# Patient Record
Sex: FEMALE | Race: WHITE
[De-identification: names, ages, dates, MRNs, and addresses within clinical notes are randomized per-mention and may not be internally consistent; named-entity substitution may affect disease eponyms.]

---

## 2017-01-23 ENCOUNTER — HOSPITAL ENCOUNTER (OUTPATIENT)
Dept: HOSPITAL 45 - C.LABSPEC | Age: 77
Discharge: HOME | End: 2017-01-23
Attending: INTERNAL MEDICINE
Payer: COMMERCIAL

## 2017-01-23 DIAGNOSIS — E78.5: ICD-10-CM

## 2017-01-23 DIAGNOSIS — E11.9: Primary | ICD-10-CM

## 2017-01-23 DIAGNOSIS — I10: ICD-10-CM

## 2017-01-23 LAB
ANION GAP SERPL CALC-SCNC: 9 MMOL/L (ref 3–11)
BUN SERPL-MCNC: 14 MG/DL (ref 7–18)
BUN/CREAT SERPL: 12.5 (ref 10–20)
CALCIUM SERPL-MCNC: 9.4 MG/DL (ref 8.5–10.1)
CHLORIDE SERPL-SCNC: 100 MMOL/L (ref 98–107)
CHOLEST/HDLC SERPL: 3.1 {RATIO}
CO2 SERPL-SCNC: 30 MMOL/L (ref 21–32)
CREAT SERPL-MCNC: 1.1 MG/DL (ref 0.6–1.2)
EST. AVERAGE GLUCOSE BLD GHB EST-MCNC: 137 MG/DL
GLUCOSE SERPL-MCNC: 103 MG/DL (ref 70–99)
GLUCOSE UR QL: 73 MG/DL
NITRITE UR QL STRIP: 131 MG/DL (ref 0–150)
PH UR: 226 MG/DL (ref 0–200)
POTASSIUM SERPL-SCNC: 3.9 MMOL/L (ref 3.5–5.1)
SODIUM SERPL-SCNC: 139 MMOL/L (ref 136–145)
VERY LOW DENSITY LIPOPROT CALC: 26 MG/DL

## 2017-02-25 ENCOUNTER — HOSPITAL ENCOUNTER (EMERGENCY)
Dept: HOSPITAL 45 - C.EDB | Age: 77
Discharge: HOME | End: 2017-02-25
Payer: COMMERCIAL

## 2017-02-25 VITALS
WEIGHT: 174.83 LBS | BODY MASS INDEX: 28.1 KG/M2 | BODY MASS INDEX: 28.1 KG/M2 | HEIGHT: 65.98 IN | WEIGHT: 174.83 LBS | HEIGHT: 65.98 IN

## 2017-02-25 VITALS — SYSTOLIC BLOOD PRESSURE: 146 MMHG | OXYGEN SATURATION: 96 % | DIASTOLIC BLOOD PRESSURE: 82 MMHG | HEART RATE: 70 BPM

## 2017-02-25 VITALS — TEMPERATURE: 98.24 F

## 2017-02-25 DIAGNOSIS — S22.000A: Primary | ICD-10-CM

## 2017-02-25 DIAGNOSIS — Z79.899: ICD-10-CM

## 2017-02-25 DIAGNOSIS — E11.9: ICD-10-CM

## 2017-02-25 DIAGNOSIS — Z83.3: ICD-10-CM

## 2017-02-25 DIAGNOSIS — I10: ICD-10-CM
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
## 2017-02-25 DIAGNOSIS — M06.9: ICD-10-CM

## 2017-02-25 DIAGNOSIS — Z87.891: ICD-10-CM

## 2017-02-25 LAB
ALBUMIN/GLOB SERPL: 0.6 {RATIO} (ref 0.9–2)
ALP SERPL-CCNC: 56 U/L (ref 45–117)
ALT SERPL-CCNC: 16 U/L (ref 12–78)
ANION GAP SERPL CALC-SCNC: 7 MMOL/L (ref 3–11)
APPEARANCE UR: CLEAR
AST SERPL-CCNC: 19 U/L (ref 15–37)
BILIRUB UR-MCNC: (no result) MG/DL
BUN SERPL-MCNC: 17 MG/DL (ref 7–18)
BUN/CREAT SERPL: 13.2 (ref 10–20)
CALCIUM SERPL-MCNC: 9.3 MG/DL (ref 8.5–10.1)
CHLORIDE SERPL-SCNC: 99 MMOL/L (ref 98–107)
CO2 SERPL-SCNC: 30 MMOL/L (ref 21–32)
COLOR UR: YELLOW
CREAT CL PREDICTED SERPL C-G-VRATE: 37.3 ML/MIN
CREAT SERPL-MCNC: 1.3 MG/DL (ref 0.6–1.2)
EOSINOPHIL NFR BLD AUTO: 190 K/UL (ref 130–400)
GLOBULIN SER-MCNC: 4.7 GM/DL (ref 2.5–4)
GLUCOSE SERPL-MCNC: 130 MG/DL (ref 70–99)
HCT VFR BLD CALC: 39.9 % (ref 37–47)
MANUAL MICROSCOPIC REQUIRED?: NO
MCH RBC QN AUTO: 33.2 PG (ref 25–34)
MCHC RBC AUTO-ENTMCNC: 34.6 G/DL (ref 32–36)
MCV RBC AUTO: 95.9 FL (ref 80–100)
NITRITE UR QL STRIP: (no result)
PH UR STRIP: 7 [PH] (ref 4.5–7.5)
PMV BLD AUTO: 10.9 FL (ref 7.4–10.4)
POTASSIUM SERPL-SCNC: 4.2 MMOL/L (ref 3.5–5.1)
RBC # BLD AUTO: 4.16 M/UL (ref 4.2–5.4)
REVIEW REQ?: NO
SODIUM SERPL-SCNC: 136 MMOL/L (ref 136–145)
SP GR UR STRIP: 1.01 (ref 1–1.03)
URINE EPITHELIAL CELL AUTO: >30 /LPF (ref 0–5)
UROBILINOGEN UR-MCNC: (no result) MG/DL
WBC # BLD AUTO: 13.61 K/UL (ref 4.8–10.8)
ZZUR CULT IF INDIC CLEAN CATCH: NO

## 2017-02-25 NOTE — DIAGNOSTIC IMAGING REPORT
THORACIC SPINE 3 VIEWS ROUTINE



CLINICAL HISTORY: Mid thoracic back pain    



COMPARISON STUDY:  Chest x-ray dated 12/26/2016



FINDINGS: There is a thoracolumbar S-shaped scoliosis. There are multiple

age-indeterminate anterior body compression deformities. There is interstitial

pulmonary fibrosis. There is a stable focal airspace opacity within the right

upper lung zone laterally.



IMPRESSION:  

1. Scoliosis

2. Multiple age-indeterminate vertebral body compression deformities

3. Interstitial pulmonary fibrosis. Stable peripheral 2.7 cm airspace opacity

within the right upper lung zone laterally









Electronically signed by:  Evan Colvin M.D.

2/25/2017 11:54 AM



Dictated Date/Time:  2/25/2017 11:51 AM

## 2017-02-25 NOTE — EMERGENCY ROOM VISIT NOTE
History


Report prepared by Irvin:  Nabil Uriostegui


Under the Supervision of:  Dr. Salo Bell M.D.


First contact with patient:  10:51


Chief Complaint:  URINARY SYMPTOMS


Stated Complaint:  FREQUENT URINATION,BACK PAIN


Nursing Triage Summary:  


Patient c/o left mid back pain, since Thursday of last week. States sometimes 


the pain is both sides of her back. Tylenol and Ibuprofen are not working. At 


rest, the pain is tolerable, but when standing pain gets worse and worsens the 


longer she is up. 











This morning she had to "urinate twice, which is not normal for me"





"my bowels, it seems I haven't gone in a day or two, but I don't have the urge, 


I'll have to eat a prune or two, that usually helps"





History of Present Illness


The patient is a 77 year old female who presents to the Emergency Room with 

complaints of constant worsening lower back pain for the past week. The patient 

states that it feels like it is a jabbing pain. The patient states that 

movement and standing makes it hurt worse. The patient denies any chest pain. 

She states that she is having some rib pain. The patient states that she went 

to the bathroom twice this morning already, and she stats that this is unusual 

for her. She states that she took ibuprofen and Tylenol, and she states that 

these have not helped. She additionally says that she has rheumatoid arthritis, 

and she called her rheumatologist, and they stated to increase her prednisone 

dosage. The patient denies any recent falls or traumas.





   Source of History:  patient


   Onset:  week ago


   Position:  back (lower)


   Quality:  other (jabbing)


   Timing:  constant, worsening


   Modifying Factors (Worsening):  movement, other (standing)


   Associated Symptoms:  No chest pain


Note:


Associated symptoms: Rib pain





Review of Systems


See HPI for pertinent positives & negatives. A total of 10 systems reviewed and 

were otherwise negative.





Past Medical & Surgical


Medical Problems:


(1) Benign tumor of breast


(2) Diabetes


(3) Heart disease


(4) Hypertension


(5) Migraines


(6) Rheumatoid arthritis








Family History





Cancer


Diabetes mellitus


Gallbladder disease


Heart disease


Lung disease





Social History


Smoking Status:  Former Smoker


Alcohol Use:  none


Marital Status:  


Housing Status:  lives alone


Occupation Status:  retired





Current/Historical Medications


Scheduled


Atenolol (Tenormin), 25 MG PO QPM


Calcium Carbonate-Vitamin D (Calcium 500 + D), 1 TAB PO BID


Clonazepam (Klonopin), 0.5 MG PO HS


Diltiazem HCl Coated Beads (Diltiazem HCl ER), 240 MG PO QPM


Losartan Potassium (Cozaar), 25 MG PO QPM


Medroxyprogesterone (Provera), 2.5 MG PO QPM


Norgestrel & Ethinyl Estradiol (Ogestrel), 0.75 MG PO QPM


Ocuvite Preservision (Ocuvite Preservision), 1 TAB PO BID


Pravastatin Sodium (Pravastatin Sodium), 80 MG PO QPM


Prednisone (Prednisone), 10 MG PO QAM





Scheduled PRN


Acetaminophen (Tylenol), 500 MG PO DAILY PRN for Pain


Butalbital-Acetaminophen-Caffe (Fioricet), 1 CAP PO DAILY PRN for Migraine


Ibuprofen (Ibuprofen), 400 MG PO Q4 PRN for Pain or Fever


Oxycodone Immediate Rel Tab (Roxicodone Ir), 1-2 TAB PO Q4H PRN for Severe Pain





Allergies


Coded Allergies:  


     Amoxicillin (Verified  Allergy, Unknown, UNKNOWN, 12/26/16)


     Sulfa Antibiotics (Verified  Adverse Reaction, Intermediate, NAUSEA, 12/26/ 16)


     Aspirin (Verified  Adverse Reaction, Mild, GI SYMPTOMS, 12/26/16)


Uncoded Allergies:  


     ARTIFICIAL SWEETNER (Allergy, Mild, TONGUE SORE, 5/9/13)





Physical Exam


Vital Signs











  Date Time  Temp Pulse Resp B/P Pulse Ox O2 Delivery O2 Flow Rate FiO2


 


2/25/17 13:09  70 18 146/82 96 Room Air  


 


2/25/17 12:08  65 18 157/83 96 Room Air  


 


2/25/17 10:16 36.8 80 18 156/88 95 Room Air  











Physical Exam


GENERAL: Patient is well appearing and in mild distress.


HEENT: No acute trauma, normocephalic atraumatic, mucous membranes moist, no 

nasal congestion, no scleral icterus.


NECK: No stridor, no adenopathy, no meningismus, trachea is midline.


LUNGS: No dyspnea. Clear to auscultation and equal bilaterally. No wheeze, no 

rhonchi.


HEART: Regular rate and rhythm.  No murmurs, rubs, gallops appreciated.


ABDOMEN: Soft, nontender, bowel sounds positive, no masses appreciated, no 

peritonitis.


BACK: Tenderness to palpation in the mid thoracic spine extending into the left 

flank.


EXTREMITIES: Normal motion all extremities, no cyanosis, no edema.


NEUROLOGIC: Alert and oriented, no acute motor or sensory deficits, no focal 

weakness, cranial nerves grossly intact.


SKIN: No rash, no jaundice, no diaphoresis.





Medical Decision & Procedures


ER Provider


Diagnostic Interpretation:


X ray results are stated below per my interpretation and the radiologist's 

interpretation. 





THORACIC SPINE 3 VIEWS ROUTINE





CLINICAL HISTORY: Mid thoracic back pain    





COMPARISON STUDY:  Chest x-ray dated 12/26/2016





FINDINGS: There is a thoracolumbar S-shaped scoliosis. There are multiple


age-indeterminate anterior body compression deformities. There is interstitial


pulmonary fibrosis. There is a stable focal airspace opacity within the right


upper lung zone laterally.





IMPRESSION:  


1. Scoliosis


2. Multiple age-indeterminate vertebral body compression deformities


3. Interstitial pulmonary fibrosis. Stable peripheral 2.7 cm airspace opacity


within the right upper lung zone laterally








Electronically signed by:  Evan Colvin M.D.


2/25/2017 11:54 AM





Dictated Date/Time:  2/25/2017 11:51 AM





Laboratory Results


2/25/17 11:23








2/25/17 11:23

















Test


  2/25/17


11:23 2/25/17


11:30


 


Red Blood Count


  4.16 M/uL


(4.2-5.4) 


 


 


Mean Corpuscular Volume


  95.9 fL


() 


 


 


Mean Corpuscular Hemoglobin


  33.2 pg


(25-34) 


 


 


Mean Corpuscular Hemoglobin


Concent 34.6 g/dl


(32-36) 


 


 


RDW Standard Deviation


  46.7 fL


(36.4-46.3) 


 


 


RDW Coefficient of Variation


  13.4 %


(11.5-14.5) 


 


 


Mean Platelet Volume


  10.9 fL


(7.4-10.4) 


 


 


Anion Gap


  7.0 mmol/L


(3-11) 


 


 


Est Creatinine Clear Calc


Drug Dose 37.3 ml/min 


  


 


 


Estimated GFR (


American) 45.2 


  


 


 


Estimated GFR (Non-


American 39.0 


  


 


 


BUN/Creatinine Ratio 13.2 (10-20)  


 


Calcium Level


  9.3 mg/dl


(8.5-10.1) 


 


 


Total Bilirubin


  0.2 mg/dl


(0.2-1) 


 


 


Aspartate Amino Transf


(AST/SGOT) 19 U/L (15-37) 


  


 


 


Alanine Aminotransferase


(ALT/SGPT) 16 U/L (12-78) 


  


 


 


Alkaline Phosphatase


  56 U/L


() 


 


 


Total Protein


  7.7 gm/dl


(6.4-8.2) 


 


 


Albumin


  3.0 gm/dl


(3.4-5.0) 


 


 


Globulin


  4.7 gm/dl


(2.5-4.0) 


 


 


Albumin/Globulin Ratio 0.6 (0.9-2)  


 


Lipase


  145 U/L


() 


 


 


Chemistry Specimen Hemolysis   


 


Urine Color  YELLOW 


 


Urine Appearance  CLEAR (CLEAR) 


 


Urine pH  7.0 (4.5-7.5) 


 


Urine Specific Gravity


  


  1.008


(1.000-1.030)


 


Urine Protein  NEG (NEG) 


 


Urine Glucose (UA)  NEG (NEG) 


 


Urine Ketones  NEG (NEG) 


 


Urine Occult Blood  NEG (NEG) 


 


Urine Nitrite  NEG (NEG) 


 


Urine Bilirubin  NEG (NEG) 


 


Urine Urobilinogen  NEG (NEG) 


 


Urine Leukocyte Esterase  SMALL (NEG) 


 


Urine WBC (Auto)


  


  5-10 /hpf


(0-5)


 


Urine RBC (Auto)  0-4 /hpf (0-4) 


 


Urine Hyaline Casts (Auto)  1-5 /lpf (0-5) 


 


Urine Epithelial Cells (Auto)  >30 /lpf (0-5) 


 


Urine Bacteria (Auto)  NEG (NEG) 








Laboratory results as reviewed by me.





ED Course


1051: The patient was evaluated in room A2. A complete history and physical 

exam was performed.





1301: Reevaluated the patient. Discussed results and discharge instructions. I 

discussed the pros and cons of a CT scan, and she states that the does not want 

any more imaging done until she follows up with her doctor this week.   The 

patient is ready for discharge.





Medical Decision


Differential: Renal Colic, Pyelonephritis, Hydronephrosis, Appendicitis, 

Diverticulitis, Retroperitoneal Bleed/Infection, Aortic Pathology, MSK, 

Neurologic Pathology, amongst other pathologies entertained.





77 yr old female arrives with complaint of mid posterior thoracic back pain.  

Worse with movement.  Long arthritic history and already on prednisone for 

these which was increased this week.  Neurologically intact.  Labs are normal, 

UA clear and imaging done.  Imaging reveals multiple age indeterminant 

compression fractures which does go with her symptoms.  She has good pulses and 

no leg issues thus I feel that spinal injury nor aortic issue.  I made clear to 

patient only way to be sure of aortic issue or spinal injury is with further 

imaging, though she is stable and in no distress here.  She prefers to go home 

with PRN pain meds and follow up with her PCP this week as already planned.  

Aware of risks/restrictions of narcotics.  RTED if worsening or call 911.





Impression





 Primary Impression:  


 Thoracic compression fracture





Scribe Attestation


The scribe's documentation has been prepared under my direction and personally 

reviewed by me in its entirety. I confirm that the note above accurately 

reflects all work, treatment, procedures, and medical decision making performed 

by me.





Departure Information


Dispostion


Home / Self-Care





Prescriptions





Oxycodone Immediate Rel Tab (ROXICODONE IR) 5 Mg Tab


1-2 TAB PO Q4H Y for Severe Pain, #15 TAB


   Prov: Salo Bell M.D.         2/25/17





Referrals


Ezra Hinson M.D. (PCP)





Forms


HOME CARE DOCUMENTATION FORM,                                                 

               IMPORTANT VISIT INFORMATION





Patient Instructions


ED Sprain Thoracic Spine, [facility], later [facility]





Additional Instructions





Return immediately if worsening pain or other concerns, especially abdominal 

pain, leg weakness or discoloration/pain.





Problem Qualifiers








 Primary Impression:  


 Thoracic compression fracture


 Encounter type:  initial encounter  Fracture type:  closed  Qualified Codes:  

S22.000A - Wedge compression fracture of unspecified thoracic vertebra, initial 

encounter for closed fracture

## 2017-04-05 ENCOUNTER — HOSPITAL ENCOUNTER (OUTPATIENT)
Dept: HOSPITAL 45 - C.LAB1850 | Age: 77
Discharge: HOME | End: 2017-04-05
Attending: INTERNAL MEDICINE
Payer: COMMERCIAL

## 2017-04-05 DIAGNOSIS — J84.9: Primary | ICD-10-CM

## 2017-05-09 ENCOUNTER — HOSPITAL ENCOUNTER (EMERGENCY)
Dept: HOSPITAL 45 - C.EDC | Age: 77
Discharge: HOME | End: 2017-05-09
Payer: COMMERCIAL

## 2017-05-09 VITALS
WEIGHT: 180.78 LBS | HEIGHT: 67.99 IN | HEIGHT: 67.99 IN | WEIGHT: 180.78 LBS | BODY MASS INDEX: 27.4 KG/M2 | BODY MASS INDEX: 27.4 KG/M2

## 2017-05-09 VITALS — SYSTOLIC BLOOD PRESSURE: 159 MMHG | HEART RATE: 65 BPM | OXYGEN SATURATION: 96 % | DIASTOLIC BLOOD PRESSURE: 63 MMHG

## 2017-05-09 VITALS — TEMPERATURE: 97.88 F

## 2017-05-09 DIAGNOSIS — E11.9: ICD-10-CM
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
## 2017-05-09 DIAGNOSIS — S30.1XXA: Primary | ICD-10-CM

## 2017-05-09 DIAGNOSIS — Z87.891: ICD-10-CM

## 2017-05-09 DIAGNOSIS — Z79.899: ICD-10-CM

## 2017-05-09 DIAGNOSIS — M06.9: ICD-10-CM

## 2017-05-09 DIAGNOSIS — I10: ICD-10-CM

## 2017-05-09 DIAGNOSIS — S10.91XA: ICD-10-CM
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
## 2017-05-09 DIAGNOSIS — S22.21XA: ICD-10-CM

## 2017-05-09 DIAGNOSIS — R07.9: ICD-10-CM

## 2017-05-09 LAB
ALP SERPL-CCNC: 54 U/L (ref 45–117)
ALT SERPL-CCNC: 23 U/L (ref 12–78)
ANION GAP SERPL CALC-SCNC: 7 MMOL/L (ref 3–11)
AST SERPL-CCNC: 19 U/L (ref 15–37)
BASOPHILS # BLD: 0.05 K/UL (ref 0–0.2)
BASOPHILS NFR BLD: 0.3 %
BUN SERPL-MCNC: 21 MG/DL (ref 7–18)
BUN/CREAT SERPL: 18.8 (ref 10–20)
CALCIUM SERPL-MCNC: 9.2 MG/DL (ref 8.5–10.1)
CHLORIDE SERPL-SCNC: 97 MMOL/L (ref 98–107)
CO2 SERPL-SCNC: 30 MMOL/L (ref 21–32)
COMPLETE: YES
CREAT CL PREDICTED SERPL C-G-VRATE: 46.6 ML/MIN
CREAT SERPL-MCNC: 1.1 MG/DL (ref 0.6–1.2)
EOSINOPHIL NFR BLD AUTO: 224 K/UL (ref 130–400)
GLUCOSE SERPL-MCNC: 149 MG/DL (ref 70–99)
HCT VFR BLD CALC: 40.5 % (ref 37–47)
IG%: 1 %
IMM GRANULOCYTES NFR BLD AUTO: 33.2 %
INR PPP: 1 (ref 0.9–1.1)
LYMPHOCYTES # BLD: 4.92 K/UL (ref 1.2–3.4)
MCH RBC QN AUTO: 33.7 PG (ref 25–34)
MCHC RBC AUTO-ENTMCNC: 34.6 G/DL (ref 32–36)
MCV RBC AUTO: 97.6 FL (ref 80–100)
MONOCYTES NFR BLD: 5.3 %
NEUTROPHILS # BLD AUTO: 1.8 %
NEUTROPHILS NFR BLD AUTO: 58.4 %
PARTIAL THROMBOPLASTIN RATIO: 0.9
PMV BLD AUTO: 9.8 FL (ref 7.4–10.4)
POTASSIUM SERPL-SCNC: 4.2 MMOL/L (ref 3.5–5.1)
PROTHROMBIN TIME: 10.5 SECONDS (ref 9–12)
RBC # BLD AUTO: 4.15 M/UL (ref 4.2–5.4)
SODIUM SERPL-SCNC: 134 MMOL/L (ref 136–145)
WBC # BLD AUTO: 14.83 K/UL (ref 4.8–10.8)

## 2017-05-09 NOTE — DIAGNOSTIC IMAGING REPORT
ABDOMEN AND PELVIS CT WITH IV CONTRAST



CT DOSE:    



HISTORY: Trauma  eval for trauma



TECHNIQUE: Multiaxial CT images of the abdomen and pelvis were performed

following the use of intravenous contrast.



COMPARISON STUDY: None.



FINDINGS: Chronic basilar fibrotic change. This is unaltered from the prior

study. Liver spleen and pancreas are unremarkable. Bladder is midline. There is

no evidence for mass collection or abscess. There is no free fluid. Potential

slight contusion of the anterior abdominal wall and periumbilical subcutaneous

fat distribution. Osseous structures show degenerative changes as well as an old

fracture of the right pubic ring. No acute process is appreciated. There are

findings of slight concavity of superior endplate of L1 as well as a limited 12

of the thoracic spine. These are considered nonacute.







IMPRESSION: 

Minimal soft tissue contusion anterior abdominal wall involving exclusively the

subcutaneous fat. No acute process of the abdomen or pelvis. Several mild

compression deformities of the thoracolumbar spine considered old. Chronic

basilar fibrotic change.







Electronically signed by:  Craig Cerda M.D.

5/9/2017 1:12 PM



Dictated Date/Time:  5/9/2017 1:06 PM
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
## 2017-05-22 ENCOUNTER — HOSPITAL ENCOUNTER (OUTPATIENT)
Dept: HOSPITAL 45 - C.LABSPEC | Age: 77
Discharge: HOME | End: 2017-05-22
Attending: INTERNAL MEDICINE
Payer: COMMERCIAL

## 2017-05-22 DIAGNOSIS — E11.9: Primary | ICD-10-CM

## 2017-05-22 DIAGNOSIS — I10: ICD-10-CM

## 2017-05-22 DIAGNOSIS — E78.5: ICD-10-CM

## 2017-05-22 DIAGNOSIS — M06.9: ICD-10-CM

## 2017-05-22 LAB
ALBUMIN/GLOB SERPL: 0.7 {RATIO} (ref 0.9–2)
ALP SERPL-CCNC: 110 U/L (ref 45–117)
ALT SERPL-CCNC: 20 U/L (ref 12–78)
ANION GAP SERPL CALC-SCNC: 7 MMOL/L (ref 3–11)
AST SERPL-CCNC: 18 U/L (ref 15–37)
BASOPHILS # BLD: 0.04 K/UL (ref 0–0.2)
BASOPHILS NFR BLD: 0.3 %
BUN SERPL-MCNC: 18 MG/DL (ref 7–18)
BUN/CREAT SERPL: 16.4 (ref 10–20)
CALCIUM SERPL-MCNC: 9.5 MG/DL (ref 8.5–10.1)
CHLORIDE SERPL-SCNC: 98 MMOL/L (ref 98–107)
CHOLEST/HDLC SERPL: 2.8 {RATIO}
CO2 SERPL-SCNC: 32 MMOL/L (ref 21–32)
COMPLETE: YES
CREAT SERPL-MCNC: 1.1 MG/DL (ref 0.6–1.2)
EOSINOPHIL NFR BLD AUTO: 254 K/UL (ref 130–400)
EST. AVERAGE GLUCOSE BLD GHB EST-MCNC: 137 MG/DL
GLOBULIN SER-MCNC: 4.3 GM/DL (ref 2.5–4)
GLUCOSE SERPL-MCNC: 102 MG/DL (ref 70–99)
GLUCOSE UR QL: 74 MG/DL
HCT VFR BLD CALC: 41.4 % (ref 37–47)
IG%: 0.2 %
IMM GRANULOCYTES NFR BLD AUTO: 38.7 %
LYMPHOCYTES # BLD: 5.48 K/UL (ref 1.2–3.4)
MCH RBC QN AUTO: 31.8 PG (ref 25–34)
MCHC RBC AUTO-ENTMCNC: 32.1 G/DL (ref 32–36)
MCV RBC AUTO: 99 FL (ref 80–100)
MONOCYTES NFR BLD: 9.7 %
NEUTROPHILS # BLD AUTO: 2.8 %
NEUTROPHILS NFR BLD AUTO: 48.3 %
NITRITE UR QL STRIP: 91 MG/DL (ref 0–150)
PH UR: 208 MG/DL (ref 0–200)
PMV BLD AUTO: 9.5 FL (ref 7.4–10.4)
POTASSIUM SERPL-SCNC: 4.3 MMOL/L (ref 3.5–5.1)
RBC # BLD AUTO: 4.18 M/UL (ref 4.2–5.4)
SMUDGE CELLS # BLD: PRESENT 10*3/UL
SODIUM SERPL-SCNC: 137 MMOL/L (ref 136–145)
VERY LOW DENSITY LIPOPROT CALC: 18 MG/DL
WBC # BLD AUTO: 14.16 K/UL (ref 4.8–10.8)

## 2017-05-30 ENCOUNTER — HOSPITAL ENCOUNTER (OUTPATIENT)
Dept: HOSPITAL 45 - C.LABSPEC | Age: 77
Discharge: HOME | End: 2017-05-30
Attending: INTERNAL MEDICINE
Payer: COMMERCIAL

## 2017-05-30 DIAGNOSIS — D72.820: Primary | ICD-10-CM

## 2017-06-06 NOTE — CODING QUERY MEDICAL NECESSITY
SUPPORTING DIAGNOSIS NEEDED



Dr. Sima Varela,



A supporting diagnosis is required for the test/procedure performed on this patient in 
order for us to be reimbursed by the patient's insurance. Please provide a supporting 
diagnosis for the following test/procedure listed below next to the test name along with 
your signature. 



*If there is no additional diagnosis for this patient that would support the following 
test/procedure please document that below next to the test/procedure.



Test(s)/Procedure(s) that require a supporting diagnosis:





* ([id number]) TRB@ GENE REARRANGE AMPLIFY      DIAGNOSIS:



* ([id number]) T-CELL GENE REARRANGEMENT           DIAGNOSIS:





***DATE OF SERVICE: 5/30/17***





Provider Signature:  ______________________________  Date:  _______



Thank you  

Froylan Levy

[facility] Information Management

Phone:  [phone]

Fax:  [phone]



Once completed, please kindly fax back to [phone]



For questions please call [phone]

## 2017-08-24 ENCOUNTER — HOSPITAL ENCOUNTER (INPATIENT)
Dept: HOSPITAL 45 - C.EDA | Age: 77
LOS: 3 days | Discharge: NURSING, REHAB OR LONG-TERM CARE | DRG: 563 | End: 2017-08-27
Attending: INTERNAL MEDICINE | Admitting: INTERNAL MEDICINE
Payer: COMMERCIAL

## 2017-08-24 VITALS — OXYGEN SATURATION: 97 %

## 2017-08-24 VITALS
DIASTOLIC BLOOD PRESSURE: 89 MMHG | SYSTOLIC BLOOD PRESSURE: 134 MMHG | OXYGEN SATURATION: 97 % | HEART RATE: 73 BPM | TEMPERATURE: 98.24 F

## 2017-08-24 VITALS
BODY MASS INDEX: 27.07 KG/M2 | WEIGHT: 182.76 LBS | WEIGHT: 182.76 LBS | HEIGHT: 69 IN | HEIGHT: 69 IN | BODY MASS INDEX: 27.07 KG/M2

## 2017-08-24 VITALS
HEART RATE: 86 BPM | OXYGEN SATURATION: 97 % | TEMPERATURE: 97.7 F | SYSTOLIC BLOOD PRESSURE: 146 MMHG | DIASTOLIC BLOOD PRESSURE: 48 MMHG

## 2017-08-24 VITALS — HEART RATE: 83 BPM | SYSTOLIC BLOOD PRESSURE: 163 MMHG | DIASTOLIC BLOOD PRESSURE: 84 MMHG

## 2017-08-24 VITALS
HEART RATE: 80 BPM | DIASTOLIC BLOOD PRESSURE: 90 MMHG | SYSTOLIC BLOOD PRESSURE: 154 MMHG | TEMPERATURE: 98.06 F | OXYGEN SATURATION: 94 %

## 2017-08-24 VITALS — OXYGEN SATURATION: 96 %

## 2017-08-24 DIAGNOSIS — S22.089S: ICD-10-CM

## 2017-08-24 DIAGNOSIS — S42.202A: Primary | ICD-10-CM

## 2017-08-24 DIAGNOSIS — S80.02XA: ICD-10-CM

## 2017-08-24 DIAGNOSIS — S32.019S: ICD-10-CM

## 2017-08-24 DIAGNOSIS — S22.32XA: ICD-10-CM

## 2017-08-24 DIAGNOSIS — F32.9: ICD-10-CM

## 2017-08-24 DIAGNOSIS — S02.401A: ICD-10-CM

## 2017-08-24 DIAGNOSIS — K58.9: ICD-10-CM

## 2017-08-24 DIAGNOSIS — E78.5: ICD-10-CM

## 2017-08-24 DIAGNOSIS — E87.1: ICD-10-CM

## 2017-08-24 DIAGNOSIS — Z88.2: ICD-10-CM
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
## 2017-08-24 DIAGNOSIS — S22.069S: ICD-10-CM

## 2017-08-24 DIAGNOSIS — M06.9: ICD-10-CM

## 2017-08-24 DIAGNOSIS — S02.80XA: ICD-10-CM
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
## 2017-08-24 DIAGNOSIS — E11.9: ICD-10-CM

## 2017-08-24 DIAGNOSIS — G25.81: ICD-10-CM

## 2017-08-24 DIAGNOSIS — Z88.0: ICD-10-CM

## 2017-08-24 DIAGNOSIS — S22.059S: ICD-10-CM
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
## 2017-08-24 DIAGNOSIS — Z83.3: ICD-10-CM

## 2017-08-24 DIAGNOSIS — Z87.891: ICD-10-CM

## 2017-08-24 DIAGNOSIS — I10: ICD-10-CM

## 2017-08-24 LAB
ALBUMIN/GLOB SERPL: 0.8 {RATIO} (ref 0.9–2)
ALP SERPL-CCNC: 97 U/L (ref 45–117)
ALT SERPL-CCNC: 16 U/L (ref 12–78)
ANION GAP SERPL CALC-SCNC: 7 MMOL/L (ref 3–11)
APPEARANCE UR: CLEAR
AST SERPL-CCNC: 19 U/L (ref 15–37)
BILIRUB UR-MCNC: (no result) MG/DL
BUN SERPL-MCNC: 15 MG/DL (ref 7–18)
BUN/CREAT SERPL: 14.8 (ref 10–20)
CALCIUM SERPL-MCNC: 9 MG/DL (ref 8.5–10.1)
CHLORIDE SERPL-SCNC: 96 MMOL/L (ref 98–107)
CO2 SERPL-SCNC: 30 MMOL/L (ref 21–32)
COLOR UR: YELLOW
CREAT CL PREDICTED SERPL C-G-VRATE: 52.5 ML/MIN
CREAT SERPL-MCNC: 1 MG/DL (ref 0.6–1.2)
EOSINOPHIL NFR BLD AUTO: 235 K/UL (ref 130–400)
GLOBULIN SER-MCNC: 3.8 GM/DL (ref 2.5–4)
GLUCOSE SERPL-MCNC: 120 MG/DL (ref 70–99)
HCT VFR BLD CALC: 38.5 % (ref 37–47)
MANUAL MICROSCOPIC REQUIRED?: NO
MCH RBC QN AUTO: 34 PG (ref 25–34)
MCHC RBC AUTO-ENTMCNC: 34.5 G/DL (ref 32–36)
MCV RBC AUTO: 98.5 FL (ref 80–100)
NITRITE UR QL STRIP: (no result)
PH UR STRIP: 5.5 [PH] (ref 4.5–7.5)
PMV BLD AUTO: 9.3 FL (ref 7.4–10.4)
POTASSIUM SERPL-SCNC: 3.9 MMOL/L (ref 3.5–5.1)
RBC # BLD AUTO: 3.91 M/UL (ref 4.2–5.4)
REVIEW REQ?: NO
SODIUM SERPL-SCNC: 133 MMOL/L (ref 136–145)
SP GR UR STRIP: 1.02 (ref 1–1.03)
URINE BILL WITH OR WITHOUT MIC: (no result)
URINE EPITHELIAL CELL AUTO: >30 /LPF (ref 0–5)
UROBILINOGEN UR-MCNC: (no result) MG/DL
WBC # BLD AUTO: 14.63 K/UL (ref 4.8–10.8)

## 2017-08-24 RX ADMIN — SODIUM CHLORIDE SCH MLS/HR: 900 INJECTION, SOLUTION INTRAVENOUS at 12:09

## 2017-08-24 RX ADMIN — Medication SCH TAB: at 21:00

## 2017-08-24 RX ADMIN — Medication SCH TAB: at 20:59

## 2017-08-24 RX ADMIN — ONDANSETRON PRN MLS/HR: 2 INJECTION INTRAMUSCULAR; INTRAVENOUS at 18:33

## 2017-08-24 RX ADMIN — KETOROLAC TROMETHAMINE PRN MG: 15 INJECTION INTRAMUSCULAR; INTRAVENOUS at 21:38

## 2017-08-24 RX ADMIN — KETOROLAC TROMETHAMINE PRN MG: 15 INJECTION INTRAMUSCULAR; INTRAVENOUS at 11:41

## 2017-08-24 RX ADMIN — DILTIAZEM HYDROCHLORIDE SCH MG: 120 CAPSULE, EXTENDED RELEASE ORAL at 21:00

## 2017-08-24 RX ADMIN — LOSARTAN POTASSIUM SCH MG: 25 TABLET ORAL at 21:03
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
## 2017-08-24 NOTE — ORTHOPEDIC CONSULTATION
DATE OF CONSULTATION:  08/24/2017

 

DATE OF CONSULTATION:  08/24/2017  

 

CHIEF COMPLAINT:  Left proximal humerus fracture, left orbital fractures,

left knee contusion and multiple thoracic compression fractures.

 

HISTORY OF PRESENT ILLNESS:  Greta is a pleasant 77-year-old female who lives

alone.  She was walking to the bathroom this morning and tripped in the

kitchen.  She fell and hit  her face.  She had pain around her left orbit and

pain in her left shoulder.  She also had pain and swelling of her left knee. 

She has been experiencing back pain for the last 3 weeks.  She is continuing

to have back pain.  It is most severe in the lower right region of her lumbar

spine; however, it has not worsened since the fall.  She went to the

Emergency Room where x-rays and CAT scan demonstrated multiple left orbital

fractures, a 4-part left proximal humerus fracture, and a left knee effusion.

 There are no signs of any rib fractures and multiple thoracic compression

fractures.  She was admitted to the medical service and orthopedics was

consulted to evaluate and treat.  She says she is able to weightbear on her

left leg without much problem.

 

PAST MEDICAL HISTORY:  Significant for benign tumors of her breast,

non-insulin dependent diabetes, heart disease, hypertension, migraines,

rheumatoid and osteoarthritis for which she is followed by Dr. Seth.

 

MEDICATIONS:  Atenolol 25 mg daily, Os-Manny 1 tab twice a day, Klonopin 0.5 mg

at night, diltiazem 240 mg daily, Cozaar 25 mg daily, Provera 2.5 mg daily,

Ogestrel 1 tab daily, Ocuvite 1 tab twice a day, pravastatin 80 mg daily,

prednisone 7.5 mg daily, and Fioricet 1 tab as needed and clobetasol as

needed.

 

ALLERGIES:  INCLUDE AMOXICILLIN, CLAVULANIC ACID, CLINDAMYCIN, PENICILLINS,

SULFA ANTIBIOTICS AND ASPIRIN.

 

FAMILY HISTORY:  Noncontributory.

 

SOCIAL HISTORY:  She is a former smoker.  She is  and lives alone. 

She is retired and independent ambulator without assistance.

 

REVIEW OF SYSTEMS:  She complains of left eye pain, left shoulder pain, left

knee pain and low back pain.  All other pertinent review of systems are

negative.

 

PHYSICAL EXAMINATION:  She does have  some swelling around her left orbit. 

There is ecchymosis in the area as well.  It  does not appear to affect her

revision.  Examination of her left upper extremity, her  radial, median and

ulnar nerves were checked and intact.  Her axillary nerve is intact to

sensation.  She is not in a sling at this time.  She has significant left

shoulder pain with any range of motion of her left arm.  There are no

abrasions, lesions, lacerations of the skin.  There is no gross deformity. 

Examination of the left knee, there is an abrasion in the area of the

patella.  There is a large joint effusion.  It does not seem to be very

painful.  She has range of motion from only 0-110 degrees.  Examination of

her spine she has minimal tenderness in the thoracic region, increased

tenderness in the lower lumbar region, especially in the right lower

quadrant.

 

IMAGING DATA:  X-rays and CT scan of the left shoulder show a 4-part left

proximal humerus fracture involving the greater and lesser tuberosities. 

There is also an intra-articular extension, but no signs of dislocation.  CT

scan of the head does show multiple left orbital fractures that are minimally

displaced.

 

X-rays of the left knee show no signs of fracture, but does show a large

joint effusion.  CT scan of the thoracic and lumbar spine does show T6, T8,

T11, T12 and L1 chronic compression deformities of the vertebral bodies. 

They do not appear to be acute.  There was a slight end plate compression at

T1 vertebral body which appears to be new  from a CT scan taken 3 months ago.

 

IMPRESSIONS:

1. Left proximal humerus fracture.

2. Left orbital fractures.

3. Left knee contusion and effusion.

4. Chronic thoracic compression fractures.  

 

PLAN:  I am going to place her in a left arm sling.  I would like to view the

x-rays personally, but the hospital viewer system is not operative at the

moment and I will view them as soon as it comes back.  With regards to her

facial fractures, these should heal fine with conservative treatment.  With

regards to her left knee, she can be weightbearing as tolerated.  As long as

she is not having any pain and she has good motion  I want to hold  off on

any aspiration at this time.  With regards to her back pain, this is  more

chronic in nature and will be treated with pain medications.  I would like to

view the x-rays personally before I make any further decisions on her

shoulder.  However, it is likely to be nonoperative.  I will follow her

closely throughout her stay.

## 2017-08-24 NOTE — DIAGNOSTIC IMAGING REPORT
CHEST CT WITH CONTRAST



CT DOSE: 401.90 mGy.cm



HISTORY: Acute fall with chest trauma  CHEST TRAUMA



TECHNIQUE: Multiaxial CT images of the chest were performed following the

intravenous administration of contrast.  A dose lowering technique was utilized

adhering to the principles of ALARA.



COMPARISON:  CT chest 5/9/2017.



FINDINGS: No dominant thyroid nodule identified. Scattered nonenlarged

nonspecific lymph nodes of the chest are seen without pathologic adenopathy by

CT size criteria. Heart is mildly enlarged without pericardial effusion.

Coronary arterial calcifications are noted. There is atherosclerotic plaquing of

the thoracic aorta without dissection or aneurysm. The opacified pulmonary

arterial tree is unremarkable.



Multifocal subpleural reticulation with linear pleural-based consolidative

opacities are present throughout the lungs bilaterally. Subpleural cystic

changes are present within the lungs bilaterally as well, notably at the level

of the deep ended lung bases. Areas of architectural distortion and mild

traction bronchiectasis are also noted. There is slight progression of

groundglass and some pleural reticulation at the level of the left lung base

without focal lobar airspace consolidation to suggest pneumonia. The central

airways are patent.



Imaged upper abdominal structures are within normal limits. Soft tissues are

unremarkable. Comminuted left proximal humerus fracture is noted with fracture

lines extending into the articular surface of the humeral head, lesser and

greater tuberosities. Chronic fracture of the mid manubrium is noted there is a

nonunion. Chronic compression deformities are noted within the T6, T8, T11, T12

and L1 vertebral bodies. No acute displaced rib fractures are identified. There

is slight anterior endplate compression of the T1 vertebral body without

significant retropulsion which does appear to be new from 5/9/2017.



IMPRESSION: 

1. No acute intrathoracic abnormality identified. No pneumothorax.

2. Acute comminuted fracture of the proximal left humerus involves the articular

humeral head, greater and lesser tuberosities.

3. Subacute to chronic appearing angulated fracture of the manubrium with

apparent partial or nonunion.

4. Chronic compression deformities of several thoracic vertebral bodies and L1.

Minimal anterior endplate compression of the T1 vertebral body appears new from

5/9/2017. 

5. Interstitial lung disease with mild progression of subpleural reticulation

and subpleural cystic change within the lingula.







Electronically signed by:  Domenico Sloan M.D.

8/24/2017 8:29 AM



Dictated Date/Time:  8/24/2017 8:17 AM

## 2017-08-24 NOTE — DIAGNOSTIC IMAGING REPORT
HEAD WITHOUT CONTRAST (CT)



CLINICAL HISTORY: 77 years-old Female with acute head injury status post fall.



TECHNIQUE: Multiple axial CT images of the head were obtained without contrast. 

A dose lowering technique was utilized adhering to the principles of ALARA.



COMPARISON: CT head 5/9/2017.



FINDINGS: 



No acute intracranial hemorrhage, midline shift, mass, large territorial

ischemia or abnormal extra-axial collection.  There is moderate cerebral atrophy

with mild ex vacuo ventriculomegaly changes. Scattered areas of periventricular,

deep and subcortical white matter low-attenuation are unchanged suggesting

chronic microvascular ischemic changes.



The calvarium is intact.  The mastoid air cells, and middle ear cavities are

clear. There is considerable amount of hemorrhagic material within the left

maxillary sinus secondary to maxillary wall fractures which are better evaluated

on comparison maxillofacial study.



IMPRESSION:  

1. No acute intracranial abnormality. Negative for intracranial hemorrhage or

calvarial fracture.

2. Chronic age-related changes of the brain.

3. Hemorrhage filled left maxillary sinus secondary to maxillary wall fractures,

better evaluated on maxillofacial study of same day.







The above report was generated using voice recognition software. It may contain

grammatical, syntax or spelling errors.







Electronically signed by:  Domenico Sloan M.D.

8/24/2017 8:17 AM



Dictated Date/Time:  8/24/2017 8:12 AM

## 2017-08-24 NOTE — DIAGNOSTIC IMAGING REPORT
CHEST ONE VIEW PORTABLE



HISTORY:  77 years-old Female fall, left rib pain 



COMPARISON: Portable chest radiograph 12/26/2016, chest CT 5/9/2017.



TECHNIQUE: Portable upright AP view of the chest.



FINDINGS: 

Cardiac silhouette is upper limits of normal. Multifocal subpleural reticular

and alveolar opacities are again seen with progression of opacities at the

lateral left lung base. There is no pneumothorax or large pleural effusion.

Blunting of the costophrenic angles likely secondary to underlying scarring.



There is convex right curvature of the upper thoracic spine. Slight cortical

irregularity of the lateral left ninth rib is noted suggesting acute

nondisplaced fracture.



IMPRESSION: 

1. Multifocal subpleural reticular and alveolar opacities are redemonstrated

suggesting background fibrotic lung disease with slight progression of disease

within the left lung base. Superimposed acute pneumonia would be difficult to

exclude.

2. No pneumothorax.

3. Slight cortical irregularity of the lateral left ninth rib is noted

suggesting acute nondisplaced fracture.





The above report was generated using voice recognition software. It may contain

grammatical, syntax or spelling errors.







Electronically signed by:  Domenico Sloan M.D.

8/24/2017 8:01 AM



Dictated Date/Time:  8/24/2017 7:57 AM

## 2017-08-24 NOTE — DIAGNOSTIC IMAGING REPORT
MAXILLOFACIAL CT



CT DOSE:    



HISTORY:      fall, facial pain



TECHNIQUE: Multiaxial CT images of the maxillofacial region were performed and

reformatted in the coronal plane without the use of contrast.  A dose lowering

technique was utilized adhering to the principles of ALARA.



COMPARISON:  None.



FINDINGS: The visualized cervical spine, mandible, skull base, nasal bones,

pterygoid plates, and zygomatic arches are intact. Slightly depressed comminuted

fractures within the anterior wall the left maxillary sinus. This demonstrates

up to 4 mm of depression. Nondisplaced left orbital floor fracture. Subtle

fracture within the medial wall of the left orbit. No evidence for herniation

through the orbital floor fracture. Trace extraconal hemorrhage adjacent to the

orbital floor fracture. The extraocular muscles, globes, and retrobulbar fat are

intact. Hemorrhage within the left maxillary sinus. Left periorbital soft tissue

swelling.



IMPRESSION:  



1. Fractures involving the anterior wall of the left maxillary sinus, medial

wall of the left orbit, and left orbital floor as described above.

2. Hemorrhage within the left maxillary sinus.

3. Left periorbital soft tissue swelling.







Electronically signed by:  Praneeth Peters M.D.

8/24/2017 8:31 AM



Dictated Date/Time:  8/24/2017 8:19 AM

## 2017-08-24 NOTE — DIAGNOSTIC IMAGING REPORT
LEFT HUMERUS MIN 2 VIEWS ROUTINE, LEFT SHOULDER MIN 2 VIEWS ROUTINE



CLINICAL HISTORY: Fall. Left arm and shoulder pain.   



COMPARISON STUDY:  None.



FINDINGS: There is a left humeral neck fracture with a small fragment displaced

laterally. The left clavicle is intact. No dislocation at the left shoulder. The

mid to distal humerus is intact.



IMPRESSION:  Left humeral neck fracture. 









Electronically signed by:  Praneeth Peters M.D.

8/24/2017 8:05 AM



Dictated Date/Time:  8/24/2017 8:02 AM
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
## 2017-08-24 NOTE — DIAGNOSTIC IMAGING REPORT
CT OF THE CERVICAL SPINE WITHOUT CONTRAST



CLINICAL HISTORY: Fall. Pain.    



COMPARISON STUDY:  Cervical spine CT May 9, 2017. 



TECHNIQUE:  Helical axial images of the cervical spine were obtained without IV

contrast.  Sagittal and coronal reconstructions were viewed.  A dose lowering

technique was utilized adhering to the principles of ALARA.





FINDINGS: Craniocervical junction is intact. Note is made of a wedge deformity

of the inferior endplate of C5 consistent with a compression fracture. This is

likely subacute. This also a mild compression fracture involving the superior

endplate of T1 which is likely subacute. No acute cervical spine fracture is

identified. There is no prevertebral edema. Chest will be reported separately.





IMPRESSION: 



1. Mild compression fractures of C5 and T1 which are likely subacute.



2. No acute cervical spine fracture.







Electronically signed by:  Kurt Linares M.D.

8/24/2017 8:32 AM



Dictated Date/Time:  8/24/2017 8:19 AM

## 2017-08-24 NOTE — EMERGENCY ROOM VISIT NOTE
History


Report prepared by Irvin:  Olena Castaneda


Under the Supervision of:  Dr. Grupo Barboza M.D.


First contact with patient:  06:38


Stated Complaint:  FALL





History of Present Illness


The patient is a 77 year old female who presents to the Emergency Room with 

complaints of a sudden fall that occurred just prior to arrival.  She currently 

rates her discomfort as a 3/10 in severity.  The patient states that around 

0500 she was walking to her bathroom and tripped in her kitchen.  Per nursing 

staff the patient hit her face, but the patient denies any loss of 

consciousness.  The patient reports left arm pain from her shoulder to her 

elbow.  She states that her arm pain worsens with movement.  The patient states 

that she got to her phone after the fall and called 911 for assistance.  She 

denies any ambulation since the fall.  The patient states that her tetanus is 

up to date.  The patient states that she is experiencing left rib pain and neck 

pain.  She denies any chest or abdominal pain.  Nursing staff reports that the 

patient may have taken an extra Clonazepam for her restless leg syndrome.  The 

patient reports a history of arthritis and states that she has been 

experiencing increased back pain since she is weaning off of her prednisone.  

She denies feeling off balance during the fall.





   Source of History:  patient


   Onset:  prior to arrival


   Position:  other (global)


   Symptom Intensity:  3/10


   Quality:  other (fall)


   Timing:  other (sudden)


   Associated Symptoms:  + neck pain, No LOC, No chest pain, No abdominal pain


Note:


Associated symptoms: left arm pain, left rib pain





Review of Systems


See HPI for pertinent positives & negatives. A total of 10 systems reviewed and 

were otherwise negative.





Past Medical & Surgical


Medical Problems:


(1) Benign tumor of breast


(2) Diabetes


(3) Heart disease


(4) Hypertension


(5) Migraines


(6) Rheumatoid arthritis








Family History





Cancer


Diabetes mellitus


Gallbladder disease


Heart disease


Lung disease





Social History


Smoking Status:  Former Smoker


Alcohol Use:  none


Marital Status:  


Housing Status:  lives alone


Occupation Status:  retired





Current/Historical Medications


Scheduled


Atenolol (Tenormin), 25 MG PO QPM


Calcium/Vitamin D (Os-Manny 500 Plus D), 1 TAB PO BID


Clonazepam (Klonopin), 0.5 MG PO HS


Diltiazem Hcl Ext Rel (Tiazac), 240 MG PO QPM


Losartan Potassium (Cozaar), 25 MG PO QPM


Medroxyprogesterone (Provera), 2.5 MG PO QPM


Norgestrel & Ethinyl Estradiol (Ogestrel), 1 TAB PO QPM


Ocuvite Preservision (Ocuvite Preservision), 1 TAB PO BID


Pravastatin Sodium (Pravastatin Sodium), 80 MG PO QPM


Prednisone (Prednisone), 7.5 MG PO QAM





Scheduled PRN


Acetamin/Butalbital/Caffeine (Fioricet), 1 TAB PO UD PRN for Migraine


Clobetasol Propionate (Clobetasol Propionate), 1 APPLN TOP UD PRN for GENITAL 

SYMPTOMS





Allergies


Coded Allergies:  


     Amoxicillin (Verified  Allergy, Unknown, UNKNOWN, 8/24/17)


     Clavulanic Acid (Unverified  Allergy, Unknown, UNKNOWN, 8/24/17)


     Clindamycin (Unverified  Allergy, Unknown, UNKNOWN, 8/24/17)


     Penicillins (Unverified  Allergy, Unknown, UNKNOWN, 8/24/17)


     Sulfa Antibiotics (Verified  Adverse Reaction, Intermediate, NAUSEA, 8/24/ 17)


     Aspirin (Verified  Adverse Reaction, Mild, GI SYMPTOMS, 8/24/17)


Uncoded Allergies:  


     ARTIFICIAL SWEETNER (Allergy, Mild, TONGUE SORE, 5/9/13)





Physical Exam


Vital Signs











  Date Time  Temp Pulse Resp B/P (MAP) Pulse Ox O2 Delivery O2 Flow Rate FiO2


 


8/24/17 08:18  69 18 129/85 96 Room Air  


 


8/24/17 06:59  81      


 


8/24/17 06:34 36.7 82 21 151/101 97 Room Air  











Physical Exam


GENERAL: Patient is in no acute distress.


HEENT: Old bleeding from the nose, no evidence for acute fracture, no active 

bleeding, contusion surrounding left eye without obvious globe trauma, no scalp 

hematoma, mucous membranes moist.  Extraocular muscles intact and full.


NECK: No stridor, no adenopathy, no meningismus, trachea is midline. Mildly 

diffusely tender along posterior c-spine, no stepoff.


CHEST WALL: Mildly tender to the left inferior, anterior ribs. No contusion.


LUNGS: Crackles on the left anteriorly, breath sounds are equal, right lung 

clear.


HEART: Without murmurs gallops or rubs, regular rate and rhythm.


ABDOMEN: Soft, nontender, bowel sounds positive, no hernias, no peritonitis.


EXTREMITIES: Some pain to move left shoulder, no obvious deformity, left elbow 

and wrist are nontender.  Abrasion to the anterior left knee without pain with 

movement, no gross deformity. Abrasion and contusion forming to the right 

posterior elbow, no evidence for underlying fracture.


NEUROLOGIC: Oriented x 3, no acute motor or sensory deficits, no focal weakness.


SKIN: No rash, no jaundice, no diaphoresis.





Medical Decision & Procedures


ER Provider


Diagnostic Interpretation:


Radiology results as stated below per my review and radiologist interpretation:





LEFT HUMERUS MIN 2 VIEWS ROUTINE, LEFT SHOULDER MIN 2 VIEWS ROUTINE





CLINICAL HISTORY: Fall. Left arm and shoulder pain.   





COMPARISON STUDY:  None.





FINDINGS: There is a left humeral neck fracture with a small fragment displaced


laterally. The left clavicle is intact. No dislocation at the left shoulder. The


mid to distal humerus is intact.





IMPRESSION:  Left humeral neck fracture. 





Electronically signed by:  Praneeth Peters M.D.


8/24/2017 8:05 AM





Dictated Date/Time:  8/24/2017 8:02 AM








MAXILLOFACIAL CT





CT DOSE:    





HISTORY:      fall, facial pain





TECHNIQUE: Multiaxial CT images of the maxillofacial region were performed and


reformatted in the coronal plane without the use of contrast.  A dose lowering


technique was utilized adhering to the principles of ALARA.





COMPARISON:  None.





FINDINGS: The visualized cervical spine, mandible, skull base, nasal bones,


pterygoid plates, and zygomatic arches are intact. Slightly depressed comminuted


fractures within the anterior wall the left maxillary sinus. This demonstrates


up to 4 mm of depression. Nondisplaced left orbital floor fracture. Subtle


fracture within the medial wall of the left orbit. No evidence for herniation


through the orbital floor fracture. Trace extraconal hemorrhage adjacent to the


orbital floor fracture. The extraocular muscles, globes, and retrobulbar fat are


intact. Hemorrhage within the left maxillary sinus. Left periorbital soft tissue


swelling.





IMPRESSION:  





1. Fractures involving the anterior wall of the left maxillary sinus, medial


wall of the left orbit, and left orbital floor as described above.


2. Hemorrhage within the left maxillary sinus.


3. Left periorbital soft tissue swelling.





Electronically signed by:  Praneeth Peters M.D.


8/24/2017 8:31 AM





Dictated Date/Time:  8/24/2017 8:19 AM





LEFT KNEE 3 VIEWS





HISTORY:  77 years-old Female fall, left knee pain 





COMPARISON: None available





TECHNIQUE: 3 views of the left knee.





FINDINGS: 


Moderate medial and lateral with severe patellofemoral osteoarthritis noted.


There is prominent marginal spurring about the patella, notably laterally.


Slight lateral tilt of the patella within the trochlear groove is also noted.





There is a large dense joint effusion. Soft tissue swelling is present within


the region of the patellar tendon. Vascular calcifications are noted.


Nonspecific 1.6 x 1.0 cm soft tissue calcification within the region of the


popliteal fossa is noted.





IMPRESSION: 


1. Large dense joint effusion without acute fracture or dislocation identified.


Soft tissue swelling is noted within the region of the patellar tendon.


2. Tricompartmental osteoarthritis, most pronounced within the patellofemoral


joint where there is severe disease.


3. Peripheral vascular disease. 





The above report was generated using voice recognition software. It may contain


grammatical, syntax or spelling errors.





Electronically signed by:  Domenico Sloan M.D.


8/24/2017 8:04 AM





Dictated Date/Time:  8/24/2017 8:01 AM





LEFT HUMERUS MIN 2 VIEWS ROUTINE, LEFT SHOULDER MIN 2 VIEWS ROUTINE





CLINICAL HISTORY: Fall. Left arm and shoulder pain.   





COMPARISON STUDY:  None.





FINDINGS: There is a left humeral neck fracture with a small fragment displaced


laterally. The left clavicle is intact. No dislocation at the left shoulder. The


mid to distal humerus is intact.





IMPRESSION:  Left humeral neck fracture. 





Electronically signed by:  Praneeth Peters M.D.


8/24/2017 8:05 AM





Dictated Date/Time:  8/24/2017 8:02 AM








HEAD WITHOUT CONTRAST (CT)





CLINICAL HISTORY: 77 years-old Female with acute head injury status post fall.





TECHNIQUE: Multiple axial CT images of the head were obtained without contrast. 


A dose lowering technique was utilized adhering to the principles of ALARA.





COMPARISON: CT head 5/9/2017.





FINDINGS: 





No acute intracranial hemorrhage, midline shift, mass, large territorial


ischemia or abnormal extra-axial collection.  There is moderate cerebral atrophy


with mild ex vacuo ventriculomegaly changes. Scattered areas of periventricular,


deep and subcortical white matter low-attenuation are unchanged suggesting


chronic microvascular ischemic changes.





The calvarium is intact.  The mastoid air cells, and middle ear cavities are


clear. There is considerable amount of hemorrhagic material within the left


maxillary sinus secondary to maxillary wall fractures which are better evaluated


on comparison maxillofacial study.





IMPRESSION:  


1. No acute intracranial abnormality. Negative for intracranial hemorrhage or


calvarial fracture.


2. Chronic age-related changes of the brain.


3. Hemorrhage filled left maxillary sinus secondary to maxillary wall fractures,


better evaluated on maxillofacial study of same day.





The above report was generated using voice recognition software. It may contain


grammatical, syntax or spelling errors.





Electronically signed by:  Domenico Sloan M.D.


8/24/2017 8:17 AM





Dictated Date/Time:  8/24/2017 8:12 AM








CHEST ONE VIEW PORTABLE





HISTORY:  77 years-old Female fall, left rib pain 





COMPARISON: Portable chest radiograph 12/26/2016, chest CT 5/9/2017.





TECHNIQUE: Portable upright AP view of the chest.





FINDINGS: 


Cardiac silhouette is upper limits of normal. Multifocal subpleural reticular


and alveolar opacities are again seen with progression of opacities at the


lateral left lung base. There is no pneumothorax or large pleural effusion.


Blunting of the costophrenic angles likely secondary to underlying scarring.





There is convex right curvature of the upper thoracic spine. Slight cortical


irregularity of the lateral left ninth rib is noted suggesting acute


nondisplaced fracture.





IMPRESSION: 


1. Multifocal subpleural reticular and alveolar opacities are redemonstrated


suggesting background fibrotic lung disease with slight progression of disease


within the left lung base. Superimposed acute pneumonia would be difficult to


exclude.


2. No pneumothorax.


3. Slight cortical irregularity of the lateral left ninth rib is noted


suggesting acute nondisplaced fracture.





The above report was generated using voice recognition software. It may contain


grammatical, syntax or spelling errors.





Electronically signed by:  Domenico Sloan M.D.


8/24/2017 8:01 AM





Dictated Date/Time:  8/24/2017 7:57 AM








CHEST CT WITH CONTRAST





CT DOSE: 401.90 mGy.cm





HISTORY: Acute fall with chest trauma  CHEST TRAUMA





TECHNIQUE: Multiaxial CT images of the chest were performed following the


intravenous administration of contrast.  A dose lowering technique was utilized


adhering to the principles of ALARA.





COMPARISON:  CT chest 5/9/2017.





FINDINGS: No dominant thyroid nodule identified. Scattered nonenlarged


nonspecific lymph nodes of the chest are seen without pathologic adenopathy by


CT size criteria. Heart is mildly enlarged without pericardial effusion.


Coronary arterial calcifications are noted. There is atherosclerotic plaquing of


the thoracic aorta without dissection or aneurysm. The opacified pulmonary


arterial tree is unremarkable.





Multifocal subpleural reticulation with linear pleural-based consolidative


opacities are present throughout the lungs bilaterally. Subpleural cystic


changes are present within the lungs bilaterally as well, notably at the level


of the deep ended lung bases. Areas of architectural distortion and mild


traction bronchiectasis are also noted. There is slight progression of


groundglass and some pleural reticulation at the level of the left lung base


without focal lobar airspace consolidation to suggest pneumonia. The central


airways are patent.





Imaged upper abdominal structures are within normal limits. Soft tissues are


unremarkable. Comminuted left proximal humerus fracture is noted with fracture


lines extending into the articular surface of the humeral head, lesser and


greater tuberosities. Chronic fracture of the mid manubrium is noted there is a


nonunion. Chronic compression deformities are noted within the T6, T8, T11, T12


and L1 vertebral bodies. No acute displaced rib fractures are identified. There


is slight anterior endplate compression of the T1 vertebral body without


significant retropulsion which does appear to be new from 5/9/2017.





IMPRESSION: 


1. No acute intrathoracic abnormality identified. No pneumothorax.


2. Acute comminuted fracture of the proximal left humerus involves the articular


humeral head, greater and lesser tuberosities.


3. Subacute to chronic appearing angulated fracture of the manubrium with


apparent partial or nonunion.


4. Chronic compression deformities of several thoracic vertebral bodies and L1.


Minimal anterior endplate compression of the T1 vertebral body appears new from


5/9/2017. 


5. Interstitial lung disease with mild progression of subpleural reticulation


and subpleural cystic change within the lingula.





Electronically signed by:  Domenico Sloan M.D.


8/24/2017 8:29 AM





Dictated Date/Time:  8/24/2017 8:17 AM








CT OF THE CERVICAL SPINE WITHOUT CONTRAST





CLINICAL HISTORY: Fall. Pain.    





COMPARISON STUDY:  Cervical spine CT May 9, 2017. 





TECHNIQUE:  Helical axial images of the cervical spine were obtained without IV


contrast.  Sagittal and coronal reconstructions were viewed.  A dose lowering


technique was utilized adhering to the principles of ALARA.








FINDINGS: Craniocervical junction is intact. Note is made of a wedge deformity


of the inferior endplate of C5 consistent with a compression fracture. This is


likely subacute. This also a mild compression fracture involving the superior


endplate of T1 which is likely subacute. No acute cervical spine fracture is


identified. There is no prevertebral edema. Chest will be reported separately.








IMPRESSION: 





1. Mild compression fractures of C5 and T1 which are likely subacute.





2. No acute cervical spine fracture.





Electronically signed by:  Kurt Linares M.D.


8/24/2017 8:32 AM





Dictated Date/Time:  8/24/2017 8:19 AM





Laboratory Results


8/24/17 07:05








8/24/17 07:05

















Test


  8/24/17


07:05 8/24/17


07:15


 


Red Blood Count


  3.91 M/uL


(4.2-5.4) 


 


 


Mean Corpuscular Volume


  98.5 fL


() 


 


 


Mean Corpuscular Hemoglobin


  34.0 pg


(25-34) 


 


 


Mean Corpuscular Hemoglobin


Concent 34.5 g/dl


(32-36) 


 


 


RDW Standard Deviation


  48.7 fL


(36.4-46.3) 


 


 


RDW Coefficient of Variation


  13.6 %


(11.5-14.5) 


 


 


Mean Platelet Volume


  9.3 fL


(7.4-10.4) 


 


 


Anion Gap


  7.0 mmol/L


(3-11) 


 


 


Est Creatinine Clear Calc


Drug Dose 52.5 ml/min 


  


 


 


Estimated GFR (


American) 62.1 


  


 


 


Estimated GFR (Non-


American 53.5 


  


 


 


BUN/Creatinine Ratio 14.8 (10-20)  


 


Calcium Level


  9.0 mg/dl


(8.5-10.1) 


 


 


Total Bilirubin


  0.3 mg/dl


(0.2-1) 


 


 


Aspartate Amino Transf


(AST/SGOT) 19 U/L (15-37) 


  


 


 


Alanine Aminotransferase


(ALT/SGPT) 16 U/L (12-78) 


  


 


 


Alkaline Phosphatase


  97 U/L


() 


 


 


Total Protein


  6.8 gm/dl


(6.4-8.2) 


 


 


Albumin


  3.0 gm/dl


(3.4-5.0) 


 


 


Globulin


  3.8 gm/dl


(2.5-4.0) 


 


 


Albumin/Globulin Ratio 0.8 (0.9-2)  


 


Urine Color  YELLOW 


 


Urine Appearance  CLEAR (CLEAR) 


 


Urine pH  5.5 (4.5-7.5) 


 


Urine Specific Gravity


  


  1.017


(1.000-1.030)


 


Urine Protein  TRACE (NEG) 


 


Urine Glucose (UA)  NEG (NEG) 


 


Urine Ketones  NEG (NEG) 


 


Urine Occult Blood  NEG (NEG) 


 


Urine Nitrite  NEG (NEG) 


 


Urine Bilirubin  NEG (NEG) 


 


Urine Urobilinogen  NEG (NEG) 


 


Urine Leukocyte Esterase  TRACE (NEG) 


 


Urine WBC (Auto)  1-5 /hpf (0-5) 


 


Urine RBC (Auto)  0-4 /hpf (0-4) 


 


Urine Hyaline Casts (Auto)  1-5 /lpf (0-5) 


 


Urine Epithelial Cells (Auto)  >30 /lpf (0-5) 


 


Urine Bacteria (Auto)  NEG (NEG) 








Laboratory results reviewed by me.





Medications Administered











 Medications


  (Trade)  Dose


 Ordered  Sig/Walter


 Route  Start Time


 Stop Time Status Last Admin


Dose Admin


 


 Acetaminophen


  (Tylenol Tab)  1,000 mg  NOW  STAT


 PO  8/24/17 06:44


 8/24/17 06:49 DC 8/24/17 07:06


1,000 MG











ECG


Indication:  other (fall)


Rate (beats per minute):  80


Rhythm:  normal sinus


Findings:  no acute ischemic change, no ectopy





ED Course


0640: The patient was evaluated in room A11B. A complete history and physical 

exam was performed.





0644: Ordered Tylenol Tab 1000 mg PO.





0841: I reevaluated the patient and she is resting comfortably.  I discussed 

the exam findings with her and I discussed the treatment plan.  Dr. Sima Varela 

will be consulted.





0849: Ordered Rocephin Inj 1 gm IV.





0850: I discussed the patients case with Dr. Sima Varela, Internal Medicine.  

He states that he will evaluate the patient for further treatment.





0857: I reevaluated the patient and she is doing well.  I updated her on the 

treatment plan and she is in agreement.  She will be evaluated for further 

treatment.





Medical Decision


The patient is a 77 year old female who presents to the ED with complaints of a 

sudden fall.  Differential diagnoses considered include rib fracture, 

pneumothorax, infection, intracranial bleeding, facial fracture, c-spine injury

, extremity fracture, anemia, UTI, electrolyte imbalance.





There is a mild leukocytosis, this could be consistent with infection-of note, 

the patient has a chronically elevated white blood cell count and today's 

numbers are close to her baseline.  No concerning anemia.  No significant 

electrolyte abnormality or kidney failure.  There is no hepatitis.  Urinalysis 

does not show infection.  EKG shows a normal sinus rhythm, no acute ischemia.  

Chest x-ray shows chronic lung disease with increased congestion on the left.  

No pneumothorax.  Radiology questioned a subtle left rib fracture.  Left 

shoulder and humerus films show a left humeral neck fracture, no shoulder 

dislocation.  Left knee film shows an effusion, no acute fracture-arthritis was 

seen.  Brain CT shows no acute bleed or mass effect.  C-spine CT shows no acute 

fracture.  Facial CT shows multiple fractures around the left orbit.  There is 

minimal displacement.  There is a left maxillary sinus effusion.  Chest CT 

shows chronic lung disease, no acute fracture, no acute traumatic process seen.

  No mention of an acute pneumonia.





The patient was given oral Tylenol.  She was given IV ceftriaxone because of 

the facial fractures.





The patient presents after falling.  She has multiple contusions and abrasions, 

a left facial fracture, a left humeral neck fracture.  I discussed the lung 

findings with the patient, her breathing has been actually better than baseline 

lately, no fever or cough, no shortness of breath.  I doubt she has an acute 

pneumonia





The patient lives alone, she is in no condition to be discharged home.  She 

requires a hospital stay and then eventually very likely rehabilitation.  I did 

speak to the patient and case management.  The on-call hospitalist was 

consulted.





Medication Reconcilliation


Current Medication List:  was personally reviewed by me





Blood Pressure Screening


Patient's blood pressure:  Elevated blood pressure


Blood pressure disposition:  Elevated BP felt to be situational, Did not 

require urgent referral





Consults


Time Called:  0847


Consulting Physician:  Dr. Sima Varela, Internal Medicine


Returned Call:  [phone]


I discussed the patients case with Dr. Sima Varela, Internal Medicine.  He 

states that he will evaluate the patient for further treatment.





Impression





 Primary Impression:  


 Facial fracture


 Additional Impressions:  


 Closed fracture of left proximal humerus


 Contusion of left knee


 Left sided chest pain





Scribe Attestation


The scribe's documentation has been prepared under my direction and personally 

reviewed by me in its entirety. I confirm that the note above accurately 

reflects all work, treatment, procedures, and medical decision making performed 

by me.





Departure Information


Dispostion


Other (Being evaluated by Dr. Sima Varela)





Referrals


Ezra Hinson M.D. (PCP)





Problem Qualifiers

## 2017-08-25 VITALS
DIASTOLIC BLOOD PRESSURE: 78 MMHG | HEART RATE: 68 BPM | OXYGEN SATURATION: 95 % | TEMPERATURE: 98.24 F | SYSTOLIC BLOOD PRESSURE: 145 MMHG

## 2017-08-25 VITALS
OXYGEN SATURATION: 93 % | HEART RATE: 78 BPM | TEMPERATURE: 98.24 F | SYSTOLIC BLOOD PRESSURE: 160 MMHG | DIASTOLIC BLOOD PRESSURE: 80 MMHG

## 2017-08-25 VITALS
OXYGEN SATURATION: 96 % | HEART RATE: 77 BPM | SYSTOLIC BLOOD PRESSURE: 151 MMHG | DIASTOLIC BLOOD PRESSURE: 78 MMHG | TEMPERATURE: 98.06 F

## 2017-08-25 VITALS — SYSTOLIC BLOOD PRESSURE: 152 MMHG | HEART RATE: 88 BPM | OXYGEN SATURATION: 94 % | DIASTOLIC BLOOD PRESSURE: 80 MMHG

## 2017-08-25 VITALS — OXYGEN SATURATION: 97 %

## 2017-08-25 VITALS — OXYGEN SATURATION: 95 %

## 2017-08-25 LAB
ANION GAP SERPL CALC-SCNC: 6 MMOL/L (ref 3–11)
BASOPHILS # BLD: 0.04 K/UL (ref 0–0.2)
BASOPHILS NFR BLD: 0.3 %
BUN SERPL-MCNC: 12 MG/DL (ref 7–18)
BUN/CREAT SERPL: 15.1 (ref 10–20)
CALCIUM SERPL-MCNC: 8.3 MG/DL (ref 8.5–10.1)
CHLORIDE SERPL-SCNC: 95 MMOL/L (ref 98–107)
CO2 SERPL-SCNC: 27 MMOL/L (ref 21–32)
COMPLETE: YES
CREAT CL PREDICTED SERPL C-G-VRATE: 64.8 ML/MIN
CREAT SERPL-MCNC: 0.81 MG/DL (ref 0.6–1.2)
EOSINOPHIL NFR BLD AUTO: 175 K/UL (ref 130–400)
GLUCOSE SERPL-MCNC: 102 MG/DL (ref 70–99)
HCT VFR BLD CALC: 31.9 % (ref 37–47)
IG%: 0.4 %
IMM GRANULOCYTES NFR BLD AUTO: 19.3 %
LYMPHOCYTES # BLD: 2.99 K/UL (ref 1.2–3.4)
MCH RBC QN AUTO: 32.7 PG (ref 25–34)
MCHC RBC AUTO-ENTMCNC: 33.5 G/DL (ref 32–36)
MCV RBC AUTO: 97.6 FL (ref 80–100)
MONOCYTES NFR BLD: 11.9 %
NEUTROPHILS # BLD AUTO: 1.9 %
NEUTROPHILS NFR BLD AUTO: 66.2 %
PMV BLD AUTO: 9.6 FL (ref 7.4–10.4)
POTASSIUM SERPL-SCNC: 4.1 MMOL/L (ref 3.5–5.1)
RBC # BLD AUTO: 3.27 M/UL (ref 4.2–5.4)
SODIUM SERPL-SCNC: 128 MMOL/L (ref 136–145)
WBC # BLD AUTO: 15.47 K/UL (ref 4.8–10.8)

## 2017-08-25 RX ADMIN — Medication SCH TAB: at 20:17

## 2017-08-25 RX ADMIN — MAGNESIUM HYDROXIDE PRN ML: 400 SUSPENSION ORAL at 19:44

## 2017-08-25 RX ADMIN — KETOROLAC TROMETHAMINE PRN MG: 15 INJECTION INTRAMUSCULAR; INTRAVENOUS at 03:16

## 2017-08-25 RX ADMIN — CLONAZEPAM SCH MG: 0.5 TABLET ORAL at 20:43

## 2017-08-25 RX ADMIN — ALUMINUM ZIRCONIUM TRICHLOROHYDREX GLY SCH EA: 0.2 STICK TOPICAL at 12:53

## 2017-08-25 RX ADMIN — ACETAMINOPHEN PRN MG: 325 TABLET ORAL at 00:24

## 2017-08-25 RX ADMIN — PRAVASTATIN SODIUM SCH MG: 40 TABLET ORAL at 17:04

## 2017-08-25 RX ADMIN — SODIUM CHLORIDE SCH MLS/HR: 900 INJECTION, SOLUTION INTRAVENOUS at 00:15

## 2017-08-25 RX ADMIN — SODIUM CHLORIDE SCH MLS/HR: 900 INJECTION, SOLUTION INTRAVENOUS at 13:39

## 2017-08-25 RX ADMIN — Medication SCH TAB: at 09:17

## 2017-08-25 RX ADMIN — IBUPROFEN PRN MG: 200 TABLET, FILM COATED ORAL at 20:16

## 2017-08-25 RX ADMIN — ALUMINUM ZIRCONIUM TRICHLOROHYDREX GLY SCH EA: 0.2 STICK TOPICAL at 23:22

## 2017-08-25 RX ADMIN — KETOROLAC TROMETHAMINE PRN MG: 15 INJECTION INTRAMUSCULAR; INTRAVENOUS at 23:35

## 2017-08-25 RX ADMIN — ALUMINUM ZIRCONIUM TRICHLOROHYDREX GLY SCH EA: 0.2 STICK TOPICAL at 16:00

## 2017-08-25 RX ADMIN — LOSARTAN POTASSIUM SCH MG: 25 TABLET ORAL at 20:43

## 2017-08-25 RX ADMIN — DOCUSATE SODIUM SCH MG: 100 CAPSULE, LIQUID FILLED ORAL at 20:43

## 2017-08-25 RX ADMIN — ONDANSETRON PRN MLS/HR: 2 INJECTION INTRAMUSCULAR; INTRAVENOUS at 09:52

## 2017-08-25 RX ADMIN — Medication SCH TAB: at 20:43

## 2017-08-25 RX ADMIN — DILTIAZEM HYDROCHLORIDE SCH MG: 120 CAPSULE, EXTENDED RELEASE ORAL at 20:44

## 2017-08-25 NOTE — PROGRESS NOTE
DATE: 08/25/2017

 

CHIEF COMPLAINT:  Minimally displaced left proximal humerus fracture.

 

PROGRESS:  Greta was seen and examined at bedside today.  Overall, she is

doing fairly well.  She is having some soreness in the shoulder and the sling

is bothering her, but overall she is not feeling too bad.  She was seen by

physical therapy today and she was able to ambulate even with her knee

effusion.  The pain medicines are helping some with her back and she has no

new complaints.

 

PHYSICAL EXAMINATION: LEFT SHOULDER.  Her radial, median and ulnar nerves

were checked and intact.  She is wearing a shoulder immobilizer as

instructed.  She is in a seated position.  She has the knee flexed almost to

90 degrees.  She has 2+ effusion.

 

IMPRESSION:  

1.  Left minimally displaced proximal humerus fracture.

2.  Left knee effusion.

 

PLAN:  Fortunately, we can treat this with conservative treatment.  I did get

a chance to look at the x-rays closely today.  She will be in an arm sling

likely for 6 weeks.  I will see her in my office in 3 weeks to get x-rays to

make sure there is no further displacement.  As far as her knee is concerned

will treat that conservatively and wait for the effusion to resolve on its

own.  She is orthopedically stable for discharge and she is possibly

scheduled to go to [facility] tomorrow.

## 2017-08-25 NOTE — PROGRESS NOTE
Progress Note


Date of Service


Aug 25, 2017.





Progress Note


77-year-old  female admitted yesterday through the emergency room 

after a fall she sustained at home.  She sustained multiple fractures including 

left humerus left 9 rib and multiple facial fracture including the orbit and 

the anterior maxillary sinus.  There was no loss of consciousness.  She tripped 

and fell down after she got up to go to the bathroom without turning the lights 

on.





Overall she is doing well.  She stated that she did not sleep well last night.  

She was complaining of her restless legs.  She still complaining of pain mostly 

in the left arm.  She is on Toradol and also added hydrocodone.  She denied any 

headache.  No dizziness no lightheadedness.  She does have some pressure in her 

face.  No neck pain.  Left-sided chest pain related to the ninth rib fracture.  

No abdominal pain no nausea no vomiting.  No back pain.  Very slight pain in 

the left knee.





EXAMINATION:


She is well-developed in no distress.


Vital signs: Blood pressure 145/78, pulse 68, respiration 19, temperature 36.8, 

oxygen saturation 95% on room air


Skin is warm and dry.  Multiple bruises.


HEENT facial bruising on the left side.  Swelling.


Neck is supple.  No adenopathy no thyromegaly.  No JVD.


Chest left-sided tenderness.


Heart regular heart sounds no murmur rub or gallop.


Lungs are clear


Abdomen is soft nontender


Back no spinal tenderness


Extremities no edema clubbing or cyanosis.  Swelling of the left knee.





LABORATORY TESTS:


WBC count 15,470, hemoglobin 10.7, hematocrit 31.9, platelet count 175,000.


Sodium 128, potassium 4.1, chloride 95, CO2 27, BU and 12, creatinine 0.81, 

glucose 102, calcium 8.3





ASSESSMENT:


* Fall


* Fracture of the left humerus


* Cortical fracture of the left ninth rib


* Orbital fracture and left maxillary sinus fracture


* Left knee trauma with swelling


* Rheumatoid arthritis


* Mild hyponatremia


* Restless leg syndrome


* Anxiety





PLAN:


* Continuing her medications


* I restarted her Klonopin


* Discontinue IV fluid


* Monitor laboratory tests


* Therapies were initiated today


* Patient has been accepted to go to [facility].  We 

will plan that for tomorrow.


* Patient has been seen by Dr. Harinder Butt.  A sling was ordered.  Patient 

having problems keeping it on all the time.  She needs to be on it for 6 weeks.

  Dr. Butt will see her in 3 weeks after discharge and get an x-ray.

## 2017-08-25 NOTE — CLINICAL DOCUMENTATION QUERY
**** CLINICAL DOCUMENTATION QUERY****



QUERY 1 OF 2



77 year old female who presented to the Emergency Room with complaints of a sudden fall 
from a standing position.  



In your clinical opinion is this patient being managed for:

    

                        (  ) Osteoporotic fractures of left humerus, rib, orbit and 
maxillary sinus

                        (  ) Not Agree



                        (x  ) Other explanation of clinical findings (Please Explain) She 
just fell down

                        (  ) Unable to determine (Please Define)

                        (  ) Need to Discuss

                        



The medical record reflects the following clinical findings, treatment, and risk factors.  
  



Clinical Indicators: Hx osteoarthritis and rheumatoid arthritis, on home prednisone 
therapy

Treatment:Ortho and Oromaxillofacial consults

Risk Factors:Age, female, post-menopausal, osteoarthritis and prednisone therapy



_____________________________________________________________________________



QUERY 2 OF 2



In your clinical opinion is this patient being managed for:

    

                        (  ) Hyponatremia

                        (x  ) Not Agree



                        (  ) Other explanation of clinical findings (Please Explain)

                        (  ) Unable to determine (Please Define)

                        (  ) Need to Discuss

                        



The medical record reflects the following clinical findings, treatment, and risk factors.  
  



Clinical Indicators: 

Treatment: IV NSS, daily PRP's

Risk Factors: Age, multiple fractures



Please clarify and document your clinical opinion in the progress notes and discharge 
summary. Terms such as "probable", "suspected", "likely", "questionable", "possible", or 
"still to be ruled out" are acceptable. 



*****IF IN AGREEMENT, YOU MUST DOCUMENT ABOVE DIAGNOSTIC STATEMENT IN DAILY PROGRESS NOTES 
AND DISCHARGE SUMMARY. This document is not part of the patient's record.*****

Thank You, Maisha Skaggs [phone]

## 2017-08-26 VITALS
OXYGEN SATURATION: 96 % | HEART RATE: 78 BPM | DIASTOLIC BLOOD PRESSURE: 81 MMHG | TEMPERATURE: 98.06 F | SYSTOLIC BLOOD PRESSURE: 147 MMHG

## 2017-08-26 VITALS
TEMPERATURE: 97.88 F | DIASTOLIC BLOOD PRESSURE: 83 MMHG | SYSTOLIC BLOOD PRESSURE: 164 MMHG | HEART RATE: 89 BPM | OXYGEN SATURATION: 94 %

## 2017-08-26 VITALS
SYSTOLIC BLOOD PRESSURE: 142 MMHG | TEMPERATURE: 98.06 F | OXYGEN SATURATION: 95 % | HEART RATE: 61 BPM | DIASTOLIC BLOOD PRESSURE: 85 MMHG

## 2017-08-26 VITALS — HEART RATE: 76 BPM | SYSTOLIC BLOOD PRESSURE: 130 MMHG | DIASTOLIC BLOOD PRESSURE: 68 MMHG

## 2017-08-26 VITALS — OXYGEN SATURATION: 96 %

## 2017-08-26 LAB
ANION GAP SERPL CALC-SCNC: 6 MMOL/L (ref 3–11)
ANION GAP SERPL CALC-SCNC: 6 MMOL/L (ref 3–11)
BASOPHILS # BLD: 0.03 K/UL (ref 0–0.2)
BASOPHILS NFR BLD: 0.2 %
BUN SERPL-MCNC: 11 MG/DL (ref 7–18)
BUN SERPL-MCNC: 11 MG/DL (ref 7–18)
BUN/CREAT SERPL: 13.5 (ref 10–20)
BUN/CREAT SERPL: 16.2 (ref 10–20)
CALCIUM SERPL-MCNC: 8.2 MG/DL (ref 8.5–10.1)
CALCIUM SERPL-MCNC: 8.4 MG/DL (ref 8.5–10.1)
CHLORIDE SERPL-SCNC: 90 MMOL/L (ref 98–107)
CHLORIDE SERPL-SCNC: 91 MMOL/L (ref 98–107)
CO2 SERPL-SCNC: 29 MMOL/L (ref 21–32)
CO2 SERPL-SCNC: 30 MMOL/L (ref 21–32)
COMPLETE: YES
CREAT CL PREDICTED SERPL C-G-VRATE: 67.3 ML/MIN
CREAT CL PREDICTED SERPL C-G-VRATE: 76.1 ML/MIN
CREAT SERPL-MCNC: 0.69 MG/DL (ref 0.6–1.2)
CREAT SERPL-MCNC: 0.78 MG/DL (ref 0.6–1.2)
EOSINOPHIL NFR BLD AUTO: 194 K/UL (ref 130–400)
GLUCOSE SERPL-MCNC: 107 MG/DL (ref 70–99)
GLUCOSE SERPL-MCNC: 131 MG/DL (ref 70–99)
HCT VFR BLD CALC: 31.3 % (ref 37–47)
IG%: 0.4 %
IMM GRANULOCYTES NFR BLD AUTO: 22.7 %
LYMPHOCYTES # BLD: 3.12 K/UL (ref 1.2–3.4)
MCH RBC QN AUTO: 32 PG (ref 25–34)
MCHC RBC AUTO-ENTMCNC: 33.2 G/DL (ref 32–36)
MCV RBC AUTO: 96.3 FL (ref 80–100)
MONOCYTES NFR BLD: 12.7 %
NEUTROPHILS # BLD AUTO: 2.8 %
NEUTROPHILS NFR BLD AUTO: 61.2 %
PMV BLD AUTO: 9.2 FL (ref 7.4–10.4)
POTASSIUM SERPL-SCNC: 3.8 MMOL/L (ref 3.5–5.1)
POTASSIUM SERPL-SCNC: 4 MMOL/L (ref 3.5–5.1)
RBC # BLD AUTO: 3.25 M/UL (ref 4.2–5.4)
SODIUM SERPL-SCNC: 126 MMOL/L (ref 136–145)
SODIUM SERPL-SCNC: 126 MMOL/L (ref 136–145)
WBC # BLD AUTO: 13.75 K/UL (ref 4.8–10.8)

## 2017-08-26 RX ADMIN — DOCUSATE SODIUM SCH MG: 100 CAPSULE, LIQUID FILLED ORAL at 08:43

## 2017-08-26 RX ADMIN — HEPARIN SODIUM SCH UNIT: 10000 INJECTION, SOLUTION INTRAVENOUS; SUBCUTANEOUS at 22:12

## 2017-08-26 RX ADMIN — FUROSEMIDE ONE MLS/MIN: 10 INJECTION, SOLUTION INTRAMUSCULAR; INTRAVENOUS at 16:38

## 2017-08-26 RX ADMIN — Medication SCH TAB: at 08:43

## 2017-08-26 RX ADMIN — CLONAZEPAM SCH MG: 0.5 TABLET ORAL at 22:10

## 2017-08-26 RX ADMIN — ACETAMINOPHEN PRN MG: 325 TABLET ORAL at 06:24

## 2017-08-26 RX ADMIN — DOCUSATE SODIUM SCH MG: 100 CAPSULE, LIQUID FILLED ORAL at 22:04

## 2017-08-26 RX ADMIN — ALUMINUM ZIRCONIUM TRICHLOROHYDREX GLY SCH EA: 0.2 STICK TOPICAL at 07:08

## 2017-08-26 RX ADMIN — KETOROLAC TROMETHAMINE PRN MG: 15 INJECTION INTRAMUSCULAR; INTRAVENOUS at 23:31

## 2017-08-26 RX ADMIN — SODIUM CHLORIDE SCH MLS/HR: 900 INJECTION, SOLUTION INTRAVENOUS at 22:36

## 2017-08-26 RX ADMIN — DILTIAZEM HYDROCHLORIDE SCH MG: 120 CAPSULE, EXTENDED RELEASE ORAL at 22:03

## 2017-08-26 RX ADMIN — LOSARTAN POTASSIUM SCH MG: 25 TABLET ORAL at 22:04

## 2017-08-26 RX ADMIN — KETOROLAC TROMETHAMINE PRN MG: 15 INJECTION INTRAMUSCULAR; INTRAVENOUS at 16:53

## 2017-08-26 RX ADMIN — Medication SCH TAB: at 08:44

## 2017-08-26 RX ADMIN — ACETAMINOPHEN PRN MG: 325 TABLET ORAL at 02:01

## 2017-08-26 RX ADMIN — Medication SCH TAB: at 22:03

## 2017-08-26 RX ADMIN — SODIUM CHLORIDE SCH MLS/HR: 900 INJECTION, SOLUTION INTRAVENOUS at 10:45

## 2017-08-26 RX ADMIN — FUROSEMIDE ONE MLS/MIN: 10 INJECTION, SOLUTION INTRAMUSCULAR; INTRAVENOUS at 14:22

## 2017-08-26 RX ADMIN — Medication SCH TAB: at 21:00

## 2017-08-26 RX ADMIN — ALUMINUM ZIRCONIUM TRICHLOROHYDREX GLY SCH EA: 0.2 STICK TOPICAL at 22:46

## 2017-08-26 RX ADMIN — ACETAMINOPHEN PRN MG: 325 TABLET ORAL at 22:07

## 2017-08-26 RX ADMIN — PRAVASTATIN SODIUM SCH MG: 40 TABLET ORAL at 16:56

## 2017-08-26 RX ADMIN — ALUMINUM ZIRCONIUM TRICHLOROHYDREX GLY SCH EA: 0.2 STICK TOPICAL at 16:00

## 2017-08-26 RX ADMIN — KETOROLAC TROMETHAMINE PRN MG: 15 INJECTION INTRAMUSCULAR; INTRAVENOUS at 10:53

## 2017-08-26 NOTE — PROGRESS NOTE
Progress Note


Date of Service


Aug 26, 2017.





Progress Note


77-year-old  female admitted through the emergency room after fall.  

She sustained multiple fractures including left humerus, left orbit, left 

maxillary sinus, cortical fracture of the left ninth rib, trauma to her left 

knee.





Patient was admitted.  All her laboratory tests were ordered.  She was seen in 

orthopedic consultation by Dr. Haridner Butt.  A sling was ordered for her left 

arm.  No surgical intervention was deemed necessary for any of the fractures 

that she sustained.  We are working on pain control.  Also working on getting 

her to [facility].





This morning she denied any headache.  She does she does complain of facial 

pressure on the left side with swelling.  No earache no sore throat.  She 

coughed up some blood this morning but that is coming from the hematoma on the 

inside of her left cheek.  She is complaining of generalized aches especially 

in her back.  Denied any chest pain no shortness of breath.  No abdominal pain 

no nausea no vomiting.  Poor appetite but she is able to eat.  She has not had 

a bowel movement yet.  No urinary problem.  She does have back pain.  Also 

swelling in her lower legs and ankles and feet.





EXAMINATION:


She is well-developed.  No acute distress.


Vital signs: Blood pressure 142/85 pulse 61 respiration 19 temperature 36.7 

oxygen saturation 95% on room air


Skin is warm and dry.  No rash.  Multiple bruises.


HEENT she does have a hematoma inside her left cheek.  That is the source of 

bleeding.  She does have swelling in the left side of her face with hematoma in 

the periorbital area.


Neck is supple.  No adenopathy.  No JVD.


Heart regular heart sounds without any murmur rub or gallop.


Lungs are clear


Abdomen is soft nontender.


Back no spinal tenderness.


Extremities 2+ edema.  Both legs.  No clubbing or cyanosis.  Bruised left knee.

  Left arm is in a sling.


Neurological examination: She is alert and oriented there is no evidence of any 

lateralized deficit





LABORATORY TESTS:


WBC count 13,750 hemoglobin 10.4, hematocrit 31.3, platelet count 194,000.


Sodium 126 potassium 3.8 chloride 91 CO2 29 BU and 11 creatinine 0.69 glucose 1 

or 7 calcium 8.2.


One blood culture was reported to be growing gram-negative bacilli early this 

morning.





ASSESSMENT:


* Fall


* Left orbital fracture


* Left maxillary sinus fracture


* Left humeral fracture


* Left ninth rib fracture.  Cortical.


* Trauma to the left knee


* Hyponatremia


* Rheumatoid arthritis


* History of supraventricular tachycardia


* Depression


* Anxiety


* Restless leg syndrome


* Interstitial lung disease


* Type 2 diabetes mellitus not requiring any drug therapy


* Chronic hyperleukocytosis


* Irritable bowel syndrome


* Bilateral leg edema





PLAN:


* Repeat blood cultures


* Check urine culture


* As far as the positive blood culture that was reported this morning we will 

wait for the final result.  I do not suspect any ongoing infection.  Her WBC 

count has been stable in the same range for approximately 2 years.  She does 

not have any fever.  No evidence source of any infection.


* IV Lasix


* IV normal saline


* Milk of magnesia for constipation


* Start subcutaneous heparin


* Continuing her medications


* Continuing therapies


* We will delay transfer to [facility] at this point until we make sure there 

is no ongoing infection and her sodium is improved


* We'll recheck PRP this afternoon

## 2017-08-27 VITALS — SYSTOLIC BLOOD PRESSURE: 143 MMHG | HEART RATE: 91 BPM | DIASTOLIC BLOOD PRESSURE: 81 MMHG

## 2017-08-27 VITALS
SYSTOLIC BLOOD PRESSURE: 147 MMHG | TEMPERATURE: 97.7 F | HEART RATE: 70 BPM | DIASTOLIC BLOOD PRESSURE: 74 MMHG | OXYGEN SATURATION: 95 %

## 2017-08-27 VITALS
SYSTOLIC BLOOD PRESSURE: 147 MMHG | OXYGEN SATURATION: 95 % | TEMPERATURE: 97.7 F | HEART RATE: 70 BPM | DIASTOLIC BLOOD PRESSURE: 74 MMHG

## 2017-08-27 LAB
ALBUMIN/GLOB SERPL: 0.7 {RATIO} (ref 0.9–2)
ALP SERPL-CCNC: 81 U/L (ref 45–117)
ALT SERPL-CCNC: 14 U/L (ref 12–78)
ANION GAP SERPL CALC-SCNC: 7 MMOL/L (ref 3–11)
AST SERPL-CCNC: 18 U/L (ref 15–37)
BASOPHILS # BLD: 0.04 K/UL (ref 0–0.2)
BASOPHILS NFR BLD: 0.3 %
BUN SERPL-MCNC: 10 MG/DL (ref 7–18)
BUN/CREAT SERPL: 13.6 (ref 10–20)
CALCIUM SERPL-MCNC: 8 MG/DL (ref 8.5–10.1)
CHLORIDE SERPL-SCNC: 89 MMOL/L (ref 98–107)
CO2 SERPL-SCNC: 30 MMOL/L (ref 21–32)
COMPLETE: YES
CREAT CL PREDICTED SERPL C-G-VRATE: 70 ML/MIN
CREAT SERPL-MCNC: 0.75 MG/DL (ref 0.6–1.2)
EOSINOPHIL NFR BLD AUTO: 168 K/UL (ref 130–400)
GLOBULIN SER-MCNC: 3.4 GM/DL (ref 2.5–4)
GLUCOSE SERPL-MCNC: 111 MG/DL (ref 70–99)
HCT VFR BLD CALC: 29 % (ref 37–47)
IG%: 0.4 %
IMM GRANULOCYTES NFR BLD AUTO: 25.1 %
LYMPHOCYTES # BLD: 3.09 K/UL (ref 1.2–3.4)
MCH RBC QN AUTO: 32.6 PG (ref 25–34)
MCHC RBC AUTO-ENTMCNC: 34.5 G/DL (ref 32–36)
MCV RBC AUTO: 94.5 FL (ref 80–100)
MONOCYTES NFR BLD: 14.3 %
NEUTROPHILS # BLD AUTO: 2.8 %
NEUTROPHILS NFR BLD AUTO: 57.1 %
PMV BLD AUTO: 9.8 FL (ref 7.4–10.4)
POTASSIUM SERPL-SCNC: 3.5 MMOL/L (ref 3.5–5.1)
RBC # BLD AUTO: 3.07 M/UL (ref 4.2–5.4)
SODIUM SERPL-SCNC: 126 MMOL/L (ref 136–145)
WBC # BLD AUTO: 12.32 K/UL (ref 4.8–10.8)

## 2017-08-27 RX ADMIN — ACETAMINOPHEN PRN MG: 325 TABLET ORAL at 05:02

## 2017-08-27 RX ADMIN — DOCUSATE SODIUM SCH MG: 100 CAPSULE, LIQUID FILLED ORAL at 08:43

## 2017-08-27 RX ADMIN — MAGNESIUM HYDROXIDE PRN ML: 400 SUSPENSION ORAL at 09:26

## 2017-08-27 RX ADMIN — ALUMINUM ZIRCONIUM TRICHLOROHYDREX GLY SCH EA: 0.2 STICK TOPICAL at 08:45

## 2017-08-27 RX ADMIN — Medication SCH TAB: at 08:51

## 2017-08-27 RX ADMIN — KETOROLAC TROMETHAMINE PRN MG: 15 INJECTION INTRAMUSCULAR; INTRAVENOUS at 08:50

## 2017-08-27 RX ADMIN — IBUPROFEN PRN MG: 200 TABLET, FILM COATED ORAL at 00:55

## 2017-08-27 RX ADMIN — Medication SCH TAB: at 08:45

## 2017-08-27 RX ADMIN — SODIUM CHLORIDE SCH MLS/HR: 900 INJECTION, SOLUTION INTRAVENOUS at 12:29

## 2017-08-27 RX ADMIN — ACETAMINOPHEN PRN MG: 325 TABLET ORAL at 11:33

## 2017-08-27 RX ADMIN — HEPARIN SODIUM SCH UNIT: 10000 INJECTION, SOLUTION INTRAVENOUS; SUBCUTANEOUS at 09:35

## 2017-08-27 NOTE — DISCHARGE INSTRUCTIONS
Discharge Instructions


Date of Service


Aug 27, 2017.





Admission


Reason for Admission:  


Fall


Fracture left humerus


Fracture left ninth rib


Left orbital fracture


Left maxillary sinus fracture


Trauma left knee


Rheumatoid arthritis


History of supraventricular tachycardia


Hypercholesterolemia


Anxiety


Restless leg syndrome


Macular degeneration


Hyponatremia


Chronic hyperleukocytosis


Type 2 diabetes mellitus not requiring any drug therapy





Discharge


Discharge Diagnosis / Problem:  fall, multiple fractures including left humerus 

left orbit left maxillary s





Discharge Goals


Goal(s):  Improve function, Increase independence, Improve disease control





Activity Recommendations


Activity Level:  Assistance Required


Therapies:  Physical Therapy, Occupational Therapy





.





Additional Information


Patient informed of condition:  Yes


Advance Directives:  No


DNR:  No


Level of Care:  Acute Rehab


Communicable Disease:  No


Prognosis:  Stable


Han Catheter:  No





Instructions / Follow-Up


Instructions / Follow-Up


Follow-up with Dr. Harinder Butt in 3 weeks


Sitting left arm





Current Hospital Diet


Patient's current hospital diet: Diabetes Type 2 Diet





Discharge Diet


Recommended Diet:  AHA Diet (Heart Healthy), Diabetes Type 2 Diet





Pending Studies


Studies pending at discharge:  yes


List of pending studies:  


Repeat blood cultures


urine culture





Medical Emergencies








.


Who to Call and When:





Medical Emergencies:  If at any time you feel your situation is an emergency, 

please call 911 immediately.





.





Non-Emergent Contact


Non-Emergency issues call your:  Primary Care Provider





.


.








"Provider Documentation" section prepared by Ezra Velásquez.








.





Core Measure Problem


Core Measures:  None

## 2017-08-27 NOTE — PROGRESS NOTE
Progress Note


Date of Service


Aug 27, 2017.





Progress Note


77-year-old  female with multiple problems including


* Fall at home


* Left humeral fracture.  Did not require any surgery


* Left facial fracture including the orbital bone and anterior maxillary sinus


* Fracture of the left ninth rib


* Trauma to the left knee


* Rheumatoid arthritis


* Hypercholesterolemia


* Anxiety


* Hyponatremia


* Restless leg syndrome


* Type 2 diabetes mellitus not requiring any drug therapy





Overall her condition has been stable.  Her sodium remains low.  But stable.  

Blood cultures done in the emergency room on admission.  One blood culture was 

growing gram-negative bacilli.  The final identification is pending.  But 

patient has not had any fever.  She does have chronic hyperleukocytosis.  She 

does not have any fever or any chills or any sign of any infection.  So it is 

possible that the positive blood cultures mostly is a contaminant.





Today she is complaining of pain mostly the left side of her face related to 

the trauma.  Also complaining of back pain.  Her knee is not giving her much 

problem.  Her left arm is in a sling.  She was already seen in orthopedic 

consultation by Dr. Harinder Butt.





She denied any dizziness or lightheadedness.  No chest pain other than what is 

related to her left ninth rib fracture.  No nausea no vomiting.  She is 

tolerating her diet.  Complaining of back pain as noted.  Her ankle edema has 

resolved.





EXAMINATION


SKIN is warm and dry.  No rash.  Bruises.


VITAL SIGNS: Blood pressure 147/74, pulse 70, respiration 20, temperature 36.5, 

oxygen saturation 95% on room air.


HEENT: Facial bruises.  Swelling left side of her face.


NECK: Supple without any tenderness.  No JVD.


HEART: Regular heart sounds without any murmur rub or gallop.


LUNGS: Are clear.


ABDOMEN: Soft nontender without organomegaly or masses


BACK: No spinal tenderness


EXTREMITIES: Resolved edema.  No clubbing or cyanosis.  Left arm is in a sling.

  Bruised left knee.





LABORATORY TESTS:


WBC count 12,320, hemoglobin 10, hematocrit 29, platelet count 168,000.


Sodium 126, potassium 3.5, chloride 89, CO2 30, EU and 10, creatinine 0.75, 

glucose 111, calcium 8.0, total bilirubin 0.4, AST 18, AST 14, alkaline 

phosphatase 81, total protein 5.7, albumin 2.3.





ASSESSMENT:


* Fall


* Multiple fractures including left orbit, left maxillary sinus, left rib 

fracture the ninth rib, left humerus


* Anxiety


* Hyponatremia


* Hyperleukocytosis


* Rheumatoid arthritis


* Restless leg syndrome


* Hypercholesterolemia


* 1 positive blood culture.  Gram-negative bacilli.  Identification pending.  

Completely asymptomatic.  Suspected obesity in a contaminant.





PLAN:


* Continuing her same medications


* We will follow up on any abnormal test results after her discharge.  

Specifically the blood cultures and a urine culture


* We will monitor her hyponatremia


* Arrangements are made for her to go to [facility] rehabilitation today.  She 

is calling her son to transport her.


* Monitor her blood sugars


* Follow up with Dr. Butt in 3 weeks


* I will see her in the office after her discharge from [facility]

## 2017-09-09 NOTE — DISCHARGE SUMMARY
Discharge Summary


Date of Service


Sep 9, 2017.





Discharge Summary


ADMISSION DATE: 08/24/2017


DISCHARGE DATE: 08/27/2017





DISCHARGE DIAGNOSES:


* fall


* fracture of the left humerus


* Fracture of the left ninth rib


* Left orbital fracture


* Left maxillary sinus fracture


* Trauma to left knee


* Rheumatoid arthritis


* History of supraventricular tachycardia


* Hypercholesterolemia


* Anxiety


* Restless leg syndrome


* Macular degeneration


* Hyponatremia


* Chronic hyperleukocytosis


* Type 2 diabetes mellitus not requiring any drug therapy





CONSULTATION:Dr. Harinder Butt in orthopedic surgery





DISCHARGE MEDICATIONS:


* docusate sodium 100 mg twice a day 


* heparin 5000 unit every 12 hours subcutaneously


* Hydrocodone/acetaminophen 5/325 one tablet every 4 hours as needed for pain


* Ibuprofen 400 mg 4 times a day as needed for pain


* Fioricet one tablet as needed for migraine


* Atenolol 25 mg daily


* Calcium with vitamin D 500 mg one tablet twice a day


* Clobetasol propionate ointment to apply to perineal area as prescribed by 

gynecology


* Diltiazem extended release 240 mg daily


* Losartan 25 mg daily


* Provera 2.5 mg daily


* Ogerstrel one tablet daily


* Ocuvite PreserVision one tablet twice a day


* Pravastatin 80 mg daily


* Prednisone 5 mg daily


* Cimzia 200 mg injection every 2 weeks





77-year-old  female admitted through the emergency room after a fall.  

She sustained multiple trauma including left humeral fracture, left rib fracture

, facial trauma with orbital bone and maxillary sinus fracture.  She also 

sustained a left knee trauma.


Patient with multiple medical problems as noted above.  She got up early in the 

morning.  She went to the bathroom.  She did not turn the lights on.  She 

tripped and fell.  She crawled to the phone.  She was able to get help.  She 

was transferred to the emergency room.  She was evaluated.  She was quite 

uncomfortable.  She did not stay in any loss of consciousness.


Patient was admitted for further treatment





PAST MEDICAL HISTORY, SOCIAL HISTORY, FAMILY HISTORY: As noted on admission 

history and physical





ALLERGIES:penicillin, sulfa, clindamycin, GI intolerance with aspirin





ADMISSION MEDICATIONS: As noted on the home medication list





PHYSICAL EXAMINATION AND LABORATORY TESTS ARE AS NOTED ON ADMISSION HITORY AND 

PHYSICAL 





HOSPITAL COURSE:


patient was admitted to a medical bed.  Because the patient [place] one.  All 

her laboratory tests will review.  On her imaging studies will review it.  She 

was started on IV fluid.  Given IV Toradol for pain.  Orthopedic consultation 

was requested.  Physical and occupational therapies were ordered.


Patient was seen by Dr. Butt.  No surgery was recommended.  I think for her 

left arm was ordered.  Patient had difficulty keeping it on as she should but 

she was encouraged to do so.  She was evaluated by physical and occupational 

therapy.  She was continued to have severe pain.  The recommendation was for 

rehabilitation.  Especially given the fact that patient lives by herself at 

home.





Her condition is stabilized.  Her WBC count remained elevated.  This is 

chronic.  She has been evaluated in hematology recently.  She was noted to be 

hyponatremic.  She was given normal saline IV fluid and IV Lasix.  One blood 

culture was reported to be positive.  This turned out to be a contaminant.  Her 

urine culture was negative.





Her condition did stabilize.  Arrangements were made for her to go to 

[facility].  Her son was able to transport her.  She 

was to see Dr. Butt in about 3 weeks.

## 2017-11-21 ENCOUNTER — HOSPITAL ENCOUNTER (OUTPATIENT)
Dept: HOSPITAL 45 - C.RAD | Age: 77
Discharge: HOME | End: 2017-11-21
Attending: INTERNAL MEDICINE
Payer: COMMERCIAL

## 2017-11-21 DIAGNOSIS — R05: Primary | ICD-10-CM

## 2017-11-21 NOTE — DIAGNOSTIC IMAGING REPORT
TWO VIEW CHEST



CLINICAL HISTORY: Cough.



FINDINGS: PA and lateral chest radiographs are compared to chest x-ray and chest

CT dated 8/24/2017. The heart is mildly enlarged and there is atherosclerotic

calcification of the thoracic aorta.  The pulmonary vasculature is noncongested.

There is marked apical scarring. Changes of chronic interstitial lung disease

are similar to previous. There is no evidence of superimposed airspace

consolidation or pleural effusion. There is no pneumothorax. The skeletal

structures are osteopenic. Degenerative change and scoliosis are noted in the

thoracic spine.



IMPRESSION:



1. Cardiomegaly without radiographic evidence of congestive failure.



2. Changes of chronic interstitial lung disease are similar to previous. There

is no evidence of superimposed airspace consolidation or pleural effusion







Electronically signed by:  Grupo Gomez M.D.

11/21/2017 10:59 AM



Dictated Date/Time:  11/21/2017 10:58 AM

## 2017-12-15 ENCOUNTER — HOSPITAL ENCOUNTER (OUTPATIENT)
Dept: HOSPITAL 45 - C.RAD1850 | Age: 77
Discharge: HOME | End: 2017-12-15
Attending: PHYSICIAN ASSISTANT
Payer: COMMERCIAL

## 2017-12-15 DIAGNOSIS — R91.8: ICD-10-CM

## 2017-12-15 DIAGNOSIS — R06.02: Primary | ICD-10-CM

## 2017-12-15 NOTE — DIAGNOSTIC IMAGING REPORT
CHEST 2 VIEWS ROUTINE



HISTORY:      Shortness of breath.



COMPARISON: Chest 11/21/2017.



FINDINGS: There again noted changes of chronic interstitial lung disease.

Increased opacification within the left mid to lower lung zone. No pleural

effusions. No pneumothorax. Mild dextroscoliosis. The heart is normal in size.

Healing/healed left humeral neck fracture is again noted. Old mild compression

deformities within the thoracic and lumbar spine.



IMPRESSION:

A new left mid to lower lung zone airspace opacity. This likely represents a

pneumonia.







Electronically signed by:  Praneeth Peters M.D.

12/15/2017 1:55 PM



Dictated Date/Time:  12/15/2017 1:50 PM

## 2017-12-21 ENCOUNTER — HOSPITAL ENCOUNTER (OUTPATIENT)
Dept: HOSPITAL 45 - C.RAD1850 | Age: 77
Discharge: HOME | End: 2017-12-21
Attending: NURSE PRACTITIONER
Payer: COMMERCIAL

## 2017-12-21 DIAGNOSIS — J84.9: Primary | ICD-10-CM

## 2017-12-21 NOTE — DIAGNOSTIC IMAGING REPORT
CHEST 2 VIEWS ROUTINE



CLINICAL HISTORY: PNEUMONIA    



COMPARISON STUDY:  12/15/2017



FINDINGS: The cardiac and mediastinal contours remain stable. There is evidence

for underlying interstitial lung disease. There are persistent but improving

left mid to lower lung zone airspace opacities. There is scoliosis. There is a

healing proximal left humeral fracture. There are no significant pleural

effusions. Multiple vertebral body compression deformities are visualized.



IMPRESSION: 

1. Slight improvement in the left mid to lower lung zone airspace opacities,

superimposed on chronic interstitial lung disease







Electronically signed by:  Evan Colvin M.D.

12/21/2017 11:45 AM



Dictated Date/Time:  12/21/2017 11:43 AM

## 2018-01-08 ENCOUNTER — HOSPITAL ENCOUNTER (OUTPATIENT)
Dept: HOSPITAL 45 - C.RAD | Age: 78
Discharge: HOME | End: 2018-01-08
Attending: PHYSICIAN ASSISTANT
Payer: COMMERCIAL

## 2018-01-08 DIAGNOSIS — J84.10: ICD-10-CM

## 2018-01-08 DIAGNOSIS — J18.9: Primary | ICD-10-CM

## 2018-01-08 NOTE — DIAGNOSTIC IMAGING REPORT
CHEST 2 VIEWS ROUTINE



CLINICAL HISTORY: J18.9 PneumoniaFollow up CXR for [id number] pneumonia



COMPARISON STUDY:  12/21/2017



FINDINGS: Extensive interstitial lung disease unchanged in terms of baseline

appearance. No evidence for progressive infiltrative change. Unchanging fullness

of the mid mediastinum and right hilum. Unchanged old fracture superior sternum.

Unchanged degenerative changes thoracic spine.



IMPRESSION:  Stable bilateral parenchymal fibrosis. No change from the prior

exam. 











The above report was generated using voice recognition software.  It may contain

grammatical, syntax or spelling errors.









Electronically signed by:  Craig Cerda M.D.

1/8/2018 11:17 AM



Dictated Date/Time:  1/8/2018 11:15 AM

## 2018-01-19 ENCOUNTER — HOSPITAL ENCOUNTER (OUTPATIENT)
Dept: HOSPITAL 45 - C.RAD1850 | Age: 78
Discharge: HOME | End: 2018-01-19
Attending: NURSE PRACTITIONER
Payer: COMMERCIAL

## 2018-01-19 DIAGNOSIS — J84.9: ICD-10-CM

## 2018-01-19 DIAGNOSIS — Z87.01: Primary | ICD-10-CM

## 2018-01-19 NOTE — DIAGNOSTIC IMAGING REPORT
CHEST 2 VIEWS ROUTINE



CLINICAL HISTORY: Z87.01 History of pneumonia Follow up CXR for

[id number]    



COMPARISON STUDY:  1/8/2018



FINDINGS: The cardiac and mediastinal contours remain stable. There is a

thoracolumbar scoliosis. There is extensive interstitial lung disease with a

subpleural distribution, unchanged from the preceding study. Superimposed left

lower lung airspace opacities are again suspected. While unchanged from the

prior study, these are more pronounced than in November 2017 suggesting a

superimposed pneumonitis.. There is no overt failure. Multiple vertebral body

compression deformities are again evident. There is an old sternal fracture.[ 



IMPRESSION: Stable left mid to lower lung zone airspace opacity superimposed on

chronic interstitial lung disease.







Electronically signed by:  Evan Colvin M.D.

1/19/2018 11:12 AM



Dictated Date/Time:  1/19/2018 10:31 AM

## 2018-02-14 ENCOUNTER — HOSPITAL ENCOUNTER (OUTPATIENT)
Dept: HOSPITAL 45 - C.LABSPEC | Age: 78
Discharge: HOME | End: 2018-02-14
Attending: INTERNAL MEDICINE
Payer: COMMERCIAL

## 2018-02-14 DIAGNOSIS — N39.0: Primary | ICD-10-CM

## 2018-02-14 DIAGNOSIS — R63.4: ICD-10-CM

## 2018-02-14 DIAGNOSIS — R10.9: ICD-10-CM

## 2018-02-14 LAB
ALBUMIN SERPL-MCNC: 2.9 GM/DL (ref 3.4–5)
ALP SERPL-CCNC: 62 U/L (ref 45–117)
ALT SERPL-CCNC: 22 U/L (ref 12–78)
AST SERPL-CCNC: 20 U/L (ref 15–37)
BASOPHILS # BLD: 0.02 K/UL (ref 0–0.2)
BASOPHILS NFR BLD: 0.2 %
BUN SERPL-MCNC: 15 MG/DL (ref 7–18)
CALCIUM SERPL-MCNC: 8.9 MG/DL (ref 8.5–10.1)
CO2 SERPL-SCNC: 32 MMOL/L (ref 21–32)
CREAT SERPL-MCNC: 1.09 MG/DL (ref 0.6–1.2)
EOS ABS #: 0.05 K/UL (ref 0–0.5)
EOSINOPHIL NFR BLD AUTO: 230 K/UL (ref 130–400)
GLUCOSE SERPL-MCNC: 184 MG/DL (ref 70–99)
HCT VFR BLD CALC: 41.3 % (ref 37–47)
HGB BLD-MCNC: 13.9 G/DL (ref 12–16)
IG#: 0.03 K/UL (ref 0–0.02)
IMM GRANULOCYTES NFR BLD AUTO: 17.1 %
LYMPHOCYTES # BLD: 1.9 K/UL (ref 1.2–3.4)
MCH RBC QN AUTO: 32.4 PG (ref 25–34)
MCHC RBC AUTO-ENTMCNC: 33.7 G/DL (ref 32–36)
MCV RBC AUTO: 96.3 FL (ref 80–100)
MONO ABS #: 0.41 K/UL (ref 0.11–0.59)
MONOCYTES NFR BLD: 3.7 %
NEUT ABS #: 8.69 K/UL (ref 1.4–6.5)
NEUTROPHILS # BLD AUTO: 0.5 %
NEUTROPHILS NFR BLD AUTO: 78.2 %
PMV BLD AUTO: 10.1 FL (ref 7.4–10.4)
POTASSIUM SERPL-SCNC: 4.1 MMOL/L (ref 3.5–5.1)
PROT SERPL-MCNC: 7.9 GM/DL (ref 6.4–8.2)
RED CELL DISTRIBUTION WIDTH CV: 14.6 % (ref 11.5–14.5)
RED CELL DISTRIBUTION WIDTH SD: 51.4 FL (ref 36.4–46.3)
SODIUM SERPL-SCNC: 132 MMOL/L (ref 136–145)
WBC # BLD AUTO: 11.1 K/UL (ref 4.8–10.8)

## 2018-02-16 ENCOUNTER — HOSPITAL ENCOUNTER (OUTPATIENT)
Dept: HOSPITAL 45 - C.CTS | Age: 78
Discharge: HOME | End: 2018-02-16
Attending: INTERNAL MEDICINE
Payer: COMMERCIAL

## 2018-02-16 DIAGNOSIS — R10.9: Primary | ICD-10-CM

## 2018-02-16 DIAGNOSIS — K57.30: ICD-10-CM

## 2018-02-16 DIAGNOSIS — M43.8X6: ICD-10-CM

## 2018-02-16 NOTE — DIAGNOSTIC IMAGING REPORT
ABDOMEN AND PELVIS CT WITH IV AND ORAL CONTRAST



CT DOSE: 313.85 mGy.cm



HISTORY: Acute left-sided abdominal pain with clinical concern for possible

acute diverticulitis  L SIDE ABD PAIN R/O DIVERTICULITIS



TECHNIQUE: Multiaxial CT images of the abdomen and pelvis were performed

following the use of intravenous and oral contrast.  A dose lowering technique

was utilized adhering to the principles of ALARA.



COMPARISON STUDY: CT abdomen and pelvis 5/9/2017, CT chest 8/24/2017.



FINDINGS: 

 Extensive subpleural reticulation with areas of honeycombing and mild traction

bronchiectasis again noted within the lung bases compatible with interstitial

lung disease. There is progression of groundglass and consolidative opacities

within the basal left lower lobe and inferior segment lingula. Evaluation of the

lung bases is limited secondary to respiratory motion. The entire study is

mildly limited secondary to motion artifact.



There is no pneumatosis or pneumoperitoneum identified. The imaged inferior

cardiac chambers are moderately enlarged with coronary arterial disease.



The liver, spleen, pancreas, gallbladder and adrenal glands are unremarkable.

There is ill-defined area of low-attenuation involving the superior pole left

kidney measuring 1.2 cm with mild surrounding perinephric stranding which

appears to be new from CT chest 8/24/2017 also new from comparison CT abdomen

and pelvis 5/9/2017. Bilateral kidneys and ureters are otherwise unremarkable.

No ureteral calculi or obstructive uropathy. Bladder is partially collapsed.

Uterus and adnexa are unremarkable.



Moderate atherosclerosis and mild tortuosity of the aorta without aneurysm. No

bulky adenopathy is identified. There is moderate circumferential wall

thickening with mild surrounding stranding involving the distal esophagus and

gastroesophageal junction. There is no bowel obstruction or focal bowel wall

thickening. Moderate sigmoid diverticulosis without CT evidence of acute

diverticulitis. The appendix appears normal. Soft tissues are unremarkable. The

bones appear moderately demineralized. Multilevel discogenic degenerative

changes are seen within the spine. Multiple compression deformities are

redemonstrated with progressively worsened 50% central compression deformity of

the L4 vertebral body. Anterior endplate compression deformity of approximately

20% involving the L2 vertebral body is also new from comparison. No significant

retropulsion.



IMPRESSION: 



1. Mild wall thickening of the distal esophagus and gastroesophageal junction

with surrounding inflammatory stranding suggests esophagitis with gastritis from

unknown etiology. These findings could be correlated with endoscopy.

2. Ill-defined 1.2 cm focal area of low-attenuation involving the superior pole

left kidney with mild surrounding stranding is new from comparison CTA of the

chest 8/24/2017 and is nonspecific with focal pyelonephritis or developing

lesion differential considerations. Attention at follow-up recommended.

3. Colonic diverticulosis without CT evidence of acute diverticulitis.

4. No bowel obstruction.

5. Interstitial lung disease again noted within the imaged lung bases with

progressively worsened consolidative and groundglass opacities of the left lung

base. Correlate clinically to exclude superimposed pneumonitis.

6. Age-indeterminate compression deformities at the L2 and L4 vertebral bodies

with compression at L2 new from 5/9/2017 and the compression deformity of L4

progressively worsened. No associated significant retropulsion, or high-grade

central canal narrowing.







Electronically signed by:  Domenico Sloan M.D.

2/16/2018 1:31 PM



Dictated Date/Time:  2/16/2018 1:18 PM

## 2018-02-22 ENCOUNTER — HOSPITAL ENCOUNTER (INPATIENT)
Dept: HOSPITAL 45 - C.EDB | Age: 78
LOS: 2 days | Discharge: HOME | DRG: 194 | End: 2018-02-24
Attending: HOSPITALIST | Admitting: HOSPITALIST
Payer: COMMERCIAL

## 2018-02-22 VITALS — HEART RATE: 68 BPM | SYSTOLIC BLOOD PRESSURE: 147 MMHG | DIASTOLIC BLOOD PRESSURE: 85 MMHG | TEMPERATURE: 97.7 F

## 2018-02-22 VITALS
OXYGEN SATURATION: 97 % | SYSTOLIC BLOOD PRESSURE: 151 MMHG | TEMPERATURE: 97.88 F | HEART RATE: 70 BPM | DIASTOLIC BLOOD PRESSURE: 96 MMHG

## 2018-02-22 VITALS
DIASTOLIC BLOOD PRESSURE: 75 MMHG | OXYGEN SATURATION: 97 % | HEART RATE: 73 BPM | TEMPERATURE: 97.7 F | SYSTOLIC BLOOD PRESSURE: 129 MMHG

## 2018-02-22 VITALS
OXYGEN SATURATION: 96 % | TEMPERATURE: 98.06 F | DIASTOLIC BLOOD PRESSURE: 75 MMHG | HEART RATE: 54 BPM | SYSTOLIC BLOOD PRESSURE: 117 MMHG

## 2018-02-22 VITALS
HEIGHT: 67.01 IN | HEIGHT: 67.01 IN | BODY MASS INDEX: 23.53 KG/M2 | BODY MASS INDEX: 23.53 KG/M2 | WEIGHT: 149.91 LBS | WEIGHT: 149.91 LBS

## 2018-02-22 VITALS — OXYGEN SATURATION: 98 % | HEART RATE: 57 BPM

## 2018-02-22 DIAGNOSIS — E87.1: ICD-10-CM

## 2018-02-22 DIAGNOSIS — J84.9: ICD-10-CM

## 2018-02-22 DIAGNOSIS — I24.8: ICD-10-CM

## 2018-02-22 DIAGNOSIS — Z83.3: ICD-10-CM

## 2018-02-22 DIAGNOSIS — I48.0: ICD-10-CM

## 2018-02-22 DIAGNOSIS — Z87.891: ICD-10-CM

## 2018-02-22 DIAGNOSIS — M06.9: ICD-10-CM

## 2018-02-22 DIAGNOSIS — J18.9: Primary | ICD-10-CM

## 2018-02-22 DIAGNOSIS — B37.2: ICD-10-CM

## 2018-02-22 DIAGNOSIS — I11.9: ICD-10-CM

## 2018-02-22 DIAGNOSIS — F32.9: ICD-10-CM

## 2018-02-22 DIAGNOSIS — E11.9: ICD-10-CM

## 2018-02-22 DIAGNOSIS — R09.02: ICD-10-CM

## 2018-02-22 DIAGNOSIS — E87.6: ICD-10-CM

## 2018-02-22 DIAGNOSIS — Z79.899: ICD-10-CM

## 2018-02-22 DIAGNOSIS — Z79.52: ICD-10-CM

## 2018-02-22 DIAGNOSIS — Z91.02: ICD-10-CM

## 2018-02-22 LAB
ALBUMIN SERPL-MCNC: 2.7 GM/DL (ref 3.4–5)
ALP SERPL-CCNC: 66 U/L (ref 45–117)
ALT SERPL-CCNC: 23 U/L (ref 12–78)
AST SERPL-CCNC: 38 U/L (ref 15–37)
BUN SERPL-MCNC: 12 MG/DL (ref 7–18)
BUN SERPL-MCNC: 13 MG/DL (ref 7–18)
CALCIUM SERPL-MCNC: 7.9 MG/DL (ref 8.5–10.1)
CALCIUM SERPL-MCNC: 8.1 MG/DL (ref 8.5–10.1)
CO2 SERPL-SCNC: 31 MMOL/L (ref 21–32)
CO2 SERPL-SCNC: 33 MMOL/L (ref 21–32)
CREAT SERPL-MCNC: 0.87 MG/DL (ref 0.6–1.2)
CREAT SERPL-MCNC: 0.88 MG/DL (ref 0.6–1.2)
EOSINOPHIL NFR BLD AUTO: 171 K/UL (ref 130–400)
GLUCOSE SERPL-MCNC: 103 MG/DL (ref 70–99)
GLUCOSE SERPL-MCNC: 109 MG/DL (ref 70–99)
HCT VFR BLD CALC: 42.3 % (ref 37–47)
HGB BLD-MCNC: 14.8 G/DL (ref 12–16)
INFLUENZA B ANTIGEN: (no result)
INR PPP: 1.1 (ref 0.9–1.1)
LIPASE: 329 U/L (ref 73–393)
LIPASE: 434 U/L (ref 73–393)
MCH RBC QN AUTO: 32.1 PG (ref 25–34)
MCHC RBC AUTO-ENTMCNC: 35 G/DL (ref 32–36)
MCV RBC AUTO: 91.8 FL (ref 80–100)
PMV BLD AUTO: 9.4 FL (ref 7.4–10.4)
POTASSIUM SERPL-SCNC: 3.2 MMOL/L (ref 3.5–5.1)
POTASSIUM SERPL-SCNC: 3.6 MMOL/L (ref 3.5–5.1)
PROT SERPL-MCNC: 7.6 GM/DL (ref 6.4–8.2)
PTT PATIENT: 28 SECONDS (ref 21–31)
RED CELL DISTRIBUTION WIDTH CV: 14.2 % (ref 11.5–14.5)
RED CELL DISTRIBUTION WIDTH SD: 47.9 FL (ref 36.4–46.3)
SODIUM SERPL-SCNC: 124 MMOL/L (ref 136–145)
SODIUM SERPL-SCNC: 124 MMOL/L (ref 136–145)
WBC # BLD AUTO: 5.9 K/UL (ref 4.8–10.8)

## 2018-02-22 RX ADMIN — DILTIAZEM HYDROCHLORIDE SCH MG: 120 CAPSULE, EXTENDED RELEASE ORAL at 20:05

## 2018-02-22 RX ADMIN — ALUMINUM ZIRCONIUM TRICHLOROHYDREX GLY SCH EA: 0.2 STICK TOPICAL at 15:23

## 2018-02-22 RX ADMIN — SODIUM CHLORIDE AND POTASSIUM CHLORIDE SCH MLS/HR: 9; 1.49 INJECTION, SOLUTION INTRAVENOUS at 16:16

## 2018-02-22 RX ADMIN — METHYLPREDNISOLONE SODIUM SUCCINATE SCH MLS/MIN: 1 INJECTION, POWDER, FOR SOLUTION INTRAMUSCULAR; INTRAVENOUS at 20:04

## 2018-02-22 RX ADMIN — Medication SCH TAB: at 20:12

## 2018-02-22 RX ADMIN — IPRATROPIUM BROMIDE AND ALBUTEROL SULFATE SCH ML: .5; 3 SOLUTION RESPIRATORY (INHALATION) at 19:02

## 2018-02-22 RX ADMIN — CLONAZEPAM SCH MG: 0.5 TABLET ORAL at 20:11

## 2018-02-22 RX ADMIN — POTASSIUM CHLORIDE SCH MLS/HR: 10 INJECTION, SOLUTION INTRAVENOUS at 16:17

## 2018-02-22 RX ADMIN — POTASSIUM CHLORIDE SCH MLS/HR: 10 INJECTION, SOLUTION INTRAVENOUS at 17:54

## 2018-02-22 RX ADMIN — HEPARIN SODIUM SCH UNIT: 10000 INJECTION, SOLUTION INTRAVENOUS; SUBCUTANEOUS at 20:13

## 2018-02-22 RX ADMIN — ALUMINUM ZIRCONIUM TRICHLOROHYDREX GLY SCH EA: 0.2 STICK TOPICAL at 20:14

## 2018-02-22 RX ADMIN — IPRATROPIUM BROMIDE AND ALBUTEROL SULFATE SCH ML: .5; 3 SOLUTION RESPIRATORY (INHALATION) at 14:35

## 2018-02-22 RX ADMIN — LOSARTAN POTASSIUM SCH MG: 25 TABLET ORAL at 20:04

## 2018-02-22 NOTE — DIAGNOSTIC IMAGING REPORT
CHEST ONE VIEW PORTABLE



CLINICAL HISTORY: Pain, radiating to the abdomen    



COMPARISON STUDY:  1/19/2018



FINDINGS: The cardiac and mediastinal contours remain stable. There is a

persistent scoliosis. There is extensive underlying interstitial lung disease

most severe within the left lower lung zone and right upper lung zone. The

findings remain unchanged. There is no acute parenchymal consolidation.[ 



IMPRESSION: Stable interstitial lung disease, with stable more focal disease

involving the right upper lung zone and left lower lung zone







Electronically signed by:  Evan Colvin M.D.

2/22/2018 10:18 AM



Dictated Date/Time:  2/22/2018 10:16 AM
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
## 2018-02-22 NOTE — DIAGNOSTIC IMAGING REPORT
(CHEST FOR PE) ANGIO WITH



CT DOSE: 281.08 mGycm



HISTORY: Chest pain  dyspnea



TECHNIQUE: Multiaxial CT images of the chest were performed following the

intravenous administration of contrast to evaluate the pulmonary arteries.

Maximal intensity projection images were also obtained.  A dose lowering

technique was utilized adhering to the principles of ALARA.





COMPARISON STUDY: 8/24/2017



FINDINGS: There is a normal caliber thoracic aorta with no evidence for

dissection. There is no evidence for pulmonary embolus. No pleural effusions. No

pneumothorax. Diffuse bilateral parenchymal fibrosis. This is in general is

somewhat progressive compared to the prior exam. There is a superimposed

infiltrative process of the left upper lobe. There is a component of peripheral

consolidation involving the lingula. Superimposed inflammatory processes

involving the right apex is not excluded. No acute bony abnormalities

appreciated. The old fractures and sternal manubrium as well as multifocal

compression deformities of the thoracic spine are unchanged from the prior

study.



IMPRESSION: 



1. 

1. Study is negative for pulmonary embolus.





2. Moderately progressive parenchymal fibrosis compared to the prior study.

3. Superimposed infiltrate medial and inferior left upper lobe and lingula as

well as right apex.

4. Old bone posttraumatic changes involving the sternal manubrium and thoracic

spine.











The above report was generated using voice recognition software.  It may contain

grammatical, syntax or spelling errors.







Electronically signed by:  Craig Cerda M.D.

2/22/2018 11:30 AM



Dictated Date/Time:  2/22/2018 11:22 AM

## 2018-02-22 NOTE — EMERGENCY ROOM VISIT NOTE
History


Report prepared by Irvin:  Iftikhar Quevedo


Under the Supervision of:  Dr. Torres Bain D.O.


First contact with patient:  09:23


Chief Complaint:  DEHYDRATION


Stated Complaint:  SEVERE DRYNESS,MOUTH AND NOSE,FEEL VERY ILL


Nursing Triage Summary:  


triage note:





pt reports no appetite, "my mouth and nose are so  dry i can barely stand."





pt reports diarrhea and nausea.





pt reports pain in left ribs that wrap around to back - started this am.





History of Present Illness


The patient is a 78 year old female who presents to the Emergency Room with 

complaints of persistent generalized weakness beginning a few days ago. She 

states "I just feel sick, I can't explain it". The patient states that she 

feels very hungry, but has not been able to eat much recently. She also notes 

that she feels very dehydrated. She also complains of a sensation of mucous in 

her throat, "sharp" left upper abdominal pain, and increased shortness of 

breath. The patient's pain began this morning, and wraps around to her back. 

Her pain is unchanged with breathing. Pt denies headache, change in vision, 

fevers, cough, nausea, vomiting, diarrhea, vaginal bleeding, pain with urination

, and melena. She is 2 L on supplemental oxygen at night at home. She notes 

that she occasionally has "yellow stains in my pantie line", but this is not 

new and has followed up with gynecologist about this. The patient had a CT 

abdomen six days ago for abdominal pain. She states that her abdominal pain 

resolved a few days later, and has returned within the past few days 

intermittently. She notes that her pain is intermittent in nature and is 

described as "stabbing". The patient is not on any blood thinners.





   Source of History:  patient


   Onset:  A few days ago


   Position:  other (generalized)


   Quality:  other (weakness)


   Timing:  other (persistent)


   Associated Symptoms:  + SOB (increased), + abdominal pain (left upper, "sharp

"), + back pain, No fevers, No chills, No headache, No chest pain, No nausea, 

No vomiting, No diarrhea, No urinary symptoms





Review of Systems


See HPI for pertinent positives & negatives. A total of 10 systems reviewed and 

were otherwise negative.





Past Medical & Surgical


Medical Problems:


(1) Benign tumor of breast


(2) Diabetes


(3) FALL, MULTIPLE FX


(4) Heart disease


(5) Hypertension


(6) Migraines


(7) Pneumonia


(8) Rheumatoid arthritis


(9) Troponin I above reference range








Family History





Cancer


Diabetes mellitus


Gallbladder disease


Heart disease


Lung disease





Social History


Smoking Status:  Former Smoker


Alcohol Use:  none


Marital Status:  


Housing Status:  lives alone


Occupation Status:  retired





Current/Historical Medications


Scheduled


Atenolol (Tenormin), 25 MG PO QPM


Calcium/Vitamin D (Os-Manny 500 Plus D), 1 TAB PO BID


Clonazepam (Klonopin), 0.5 MG PO HS


Diltiazem Hcl Ext Rel (Tiazac), 240 MG PO QPM


Estropipate (Estropipate), 0.75 MG PO DAILY


Losartan Potassium (Cozaar), 25 MG PO QPM


Medroxyprogesterone (Provera), 2.5 MG PO QPM


Ocuvite Preservision (Ocuvite Preservision), 1 TAB PO BID


Pantoprazole (Pantoprazole Sodium), 40 MG PO DAILY


Pravastatin Sodium (Pravastatin Sodium), 80 MG PO DAILY


Prednisone (Prednisone), 10 MG PO QAM





Scheduled PRN


Acetamin/Butalbital/Caffeine (Fioricet), 1 TAB PO UD PRN for Migraine





Allergies


Coded Allergies:  


     Amoxicillin (Verified  Allergy, Unknown, UNKNOWN, 8/24/17)


     Clavulanic Acid (Verified  Allergy, Unknown, UNKNOWN, 8/24/17)


     Clindamycin (Verified  Allergy, Unknown, UNKNOWN, 8/24/17)


     Penicillins (Verified  Allergy, Unknown, UNKNOWN, 8/24/17)


     Sulfa Antibiotics (Verified  Adverse Reaction, Intermediate, NAUSEA, 8/24/ 17)


     Aspirin (Verified  Adverse Reaction, Mild, GI SYMPTOMS, 8/24/17)


Uncoded Allergies:  


     ARTIFICIAL SWEETNER (Allergy, Mild, TONGUE SORE, 5/9/13)





Physical Exam


Vital Signs











  Date Time  Temp Pulse Resp B/P (MAP) Pulse Ox O2 Delivery O2 Flow Rate FiO2


 


2/22/18 12:03  90 20 155/94 97   


 


2/22/18 11:53    161/101    


 


2/22/18 11:51  98 18 161/100 96   


 


2/22/18 09:13 36.6 91 18 149/85 89 Room Air  











Physical Exam


GENERAL: Sitting up in bed, talking in full sentences, no acute distress, 

nontoxic


EYE EXAM: normal conjunctiva. PERRL and EOM's grossly intact.


OROPHARYNX: no exudate, no erythema, lips, buccal mucosa, and tongue normal and 

mucous membranes are moist


NECK: supple, no nuchal rigidity, no adenopathy, non-tender. No JVD. 


LUNGS: Faint wheezing bilaterally. Normal chest wall mechanics


HEART: no murmurs, S1 normal and S2 normal 


ABDOMEN: abdomen soft, normo-active bowel sounds, no masses, no rebound or 

guarding. Minimal diffuse tenderness in LUQ. 


BACK: Back is symmetrical on inspection and there is no deformity, no midline 

tenderness, no CVA tenderness. 


SKIN: no rashes and no bruising 


UPPER EXTREMITIES: upper extremities are grossly normal. 


LOWER EXTREMITIES: Calves equal bilaterally. 


NEURO EXAM: Normal sensorium, cranial nerves II-XII grossly intact, normal 

speech,  no gross weakness of arms, no gross weakness of legs.





Medical Decision & Procedures


ER Provider


Diagnostic Interpretation:


Radiology results as stated below per my review and the radiologist's 

interpretation: 





(CHEST FOR PE) ANGIO WITH





FINDINGS: There is a normal caliber thoracic aorta with no evidence for


dissection. There is no evidence for pulmonary embolus. No pleural effusions. No


pneumothorax. Diffuse bilateral parenchymal fibrosis. This is in general is


somewhat progressive compared to the prior exam. There is a superimposed


infiltrative process of the left upper lobe. There is a component of peripheral


consolidation involving the lingula. Superimposed inflammatory processes


involving the right apex is not excluded. No acute bony abnormalities


appreciated. The old fractures and sternal manubrium as well as multifocal


compression deformities of the thoracic spine are unchanged from the prior


study.





IMPRESSION: 





1. Study is negative for pulmonary embolus.








2. Moderately progressive parenchymal fibrosis compared to the prior study.


3. Superimposed infiltrate medial and inferior left upper lobe and lingula as


well as right apex.


4. Old bone posttraumatic changes involving the sternal manubrium and thoracic


spine





The above report was generated using voice recognition software.  It may contain


grammatical, syntax or spelling errors.





Electronically signed by:  Craig Cerda M.D.


2/22/2018 11:30 AM








CHEST ONE VIEW PORTABLE





FINDINGS: The cardiac and mediastinal contours remain stable. There is a


persistent scoliosis. There is extensive underlying interstitial lung disease


most severe within the left lower lung zone and right upper lung zone. The


findings remain unchanged. There is no acute parenchymal consolidation.[ 





IMPRESSION: Stable interstitial lung disease, with stable more focal disease


involving the right upper lung zone and left lower lung zone





Electronically signed by:  Evan Colvin M.D.


2/22/2018 10:18 AM





Laboratory Results


2/22/18 10:05








Red Blood Count 4.61, Mean Corpuscular Volume 91.8, Mean Corpuscular Hemoglobin 

32.1, Mean Corpuscular Hemoglobin Concent 35.0, Mean Platelet Volume 9.4





2/22/18 10:05

















Test


  2/22/18


10:00 2/22/18


10:05 2/22/18


11:30


 


Influenza Type A Antigen


  Neg for Influ


A (NEG) 


  


 


 


Influenza Type B Antigen


  Neg for Influ


B (NEG) 


  


 


 


White Blood Count


  


  5.90 K/uL


(4.8-10.8) 


 


 


Red Blood Count


  


  4.61 M/uL


(4.2-5.4) 


 


 


Hemoglobin


  


  14.8 g/dL


(12.0-16.0) 


 


 


Hematocrit  42.3 % (37-47)  


 


Mean Corpuscular Volume


  


  91.8 fL


() 


 


 


Mean Corpuscular Hemoglobin


  


  32.1 pg


(25-34) 


 


 


Mean Corpuscular Hemoglobin


Concent 


  35.0 g/dl


(32-36) 


 


 


Platelet Count


  


  171 K/uL


(130-400) 


 


 


Mean Platelet Volume


  


  9.4 fL


(7.4-10.4) 


 


 


RDW Standard Deviation


  


  47.9 fL


(36.4-46.3) 


 


 


RDW Coefficient of Variation


  


  14.2 %


(11.5-14.5) 


 


 


Neutrophils % (Manual)  31.0 %  


 


Lymphocytes % (Manual)  20.4 %  


 


Monocytes % (Manual)  15.0 %  


 


Basophils % (Manual)  0.9 %  


 


Neutrophils # (Manual)


  


  1.83 K/uL


(1.4-6.5) 


 


 


Total Absolute Neutrophils


  


  1.83 K/uL


(1.4-6.5) 


 


 


Lymphocytes # (Manual)


  


  1.20 K/uL


(1.2-3.4) 


 


 


Total Absolute Lymphocytes


  


  3.13 K/uL


(1.2-3.4) 


 


 


Monocytes # (Manual)


  


  0.89 K/uL


(0.11-0.59) 


 


 


Basophils # (Manual)


  


  0.05 K/uL


(0-0.2) 


 


 


Percent Large Granular


Lymphocytes 


  32.7 % 


  


 


 


Absolute Large Granular


Lymphocytes 


  1.93 K/uL 


  


 


 


Smudge Cells  PRESENT  


 


Anion Gap


  


  8.0 mmol/L


(3-11) 


 


 


Est Creatinine Clear Calc


Drug Dose 


  49.6 ml/min 


  


 


 


Estimated GFR (


American) 


  71.9 


  


 


 


Estimated GFR (Non-


American 


  62.1 


  


 


 


BUN/Creatinine Ratio  15.0 (10-20)  


 


Calcium Level


  


  8.1 mg/dl


(8.5-10.1) 


 


 


Total Bilirubin


  


  0.3 mg/dl


(0.2-1) 


 


 


Direct Bilirubin


  


  < 0.1 mg/dl


(0-0.2) 


 


 


Aspartate Amino Transf


(AST/SGOT) 


  38 U/L (15-37) 


  


 


 


Alanine Aminotransferase


(ALT/SGPT) 


  23 U/L (12-78) 


  


 


 


Alkaline Phosphatase


  


  66 U/L


() 


 


 


Total Protein


  


  7.6 gm/dl


(6.4-8.2) 


 


 


Albumin


  


  2.7 gm/dl


(3.4-5.0) 


 


 


Urine Color   YELLOW 


 


Urine Appearance   CLEAR (CLEAR) 


 


Urine pH   7.0 (4.5-7.5) 


 


Urine Specific Gravity


  


  


  1.013


(1.000-1.030)


 


Urine Protein   2+ (NEG) 


 


Urine Glucose (UA)   NEG (NEG) 


 


Urine Ketones   NEG (NEG) 


 


Urine Occult Blood   NEG (NEG) 


 


Urine Nitrite   NEG (NEG) 


 


Urine Bilirubin   NEG (NEG) 


 


Urine Urobilinogen   NEG (NEG) 


 


Urine Leukocyte Esterase   NEG (NEG) 


 


Urine WBC (Auto)   1-5 /hpf (0-5) 


 


Urine RBC (Auto)   0-4 /hpf (0-4) 


 


Urine Hyaline Casts (Auto)   1-5 /lpf (0-5) 


 


Urine Epithelial Cells (Auto)


  


  


  10-20 /lpf


(0-5)


 


Urine Bacteria (Auto)   NEG (NEG) 





Laboratory results per my review.





Medications Administered











 Medications


  (Trade)  Dose


 Ordered  Sig/Walter


 Route  Start Time


 Stop Time Status Last Admin


Dose Admin


 


 Sodium Chloride  1,000 ml @ 


 999 mls/hr  Q1H1M STAT


 IV  2/22/18 09:38


 2/22/18 10:38 DC 2/22/18 09:55


999 MLS/HR


 


 Ondansetron HCl


  (Zofran Inj)  4 mg  NOW  STAT


 IV  2/22/18 09:38


 2/22/18 09:39 DC 2/22/18 09:53


4 MG


 


 Albuterol Sulfate


  (Ventolin 0.083%


 2.5MG/3ML Neb)  2.5 mg  NOW  STAT


 INH  2/22/18 09:50


 2/22/18 09:51 DC 2/22/18 10:02


2.5 MG


 


 Al Hydroxide/Mg


 Hydroxide


  (Maalox Susp)  30 ml  STK-MED ONCE


 .ROUTE  2/22/18 09:50


 2/22/18 09:51 DC 2/22/18 09:53


30 ML


 


 Lidocaine HCl


  (Viscous


 Lidocaine 2% Soln)  20 ml  STK-MED ONCE


 .ROUTE  2/22/18 09:50


 2/22/18 09:51 DC 2/22/18 09:53


20 ML


 


 Levofloxacin


  (Levaquin / D5W)  750 mg  NOW  STAT


 IV  2/22/18 11:34


 2/22/18 11:35 DC 2/22/18 12:02


750 MG











ECG Per My Interpretation


Indication:  weakness


Rate (beats per minute):  112


Rhythm:  sinus tachycardia


Findings:  PVC, T-wave inversion (Inferior), other (Poor baseline)


Comparison ECG Date:  8/24/2017


Change:


T-wave inversion is worse.





ED Course


ED COURSE: 


Vital signs were reviewed and showed hypoxia


The patients medical record was reviewed


The above diagnostic studies were performed and reviewed.


ED treatments and interventions as stated above. 





0932: The patient was evaluated in room B6. A complete history and physical 

examination was performed.





0938: Ordered Zofran Inj 4 mg IV, Sodium Chloride 1000 ml @ 999 mls/hr IV.





0943: Ordered GI Cocktail 24 mL PO. 





[]: Upon reevaluation, the patient is [].I discussed my findings with the [

patient] and [] understands and agrees with the treatment plan.   


Based on the patients age, coexisting illnesses, exam and lab findings the 

decision to treat as an [inpatient][outpatient] was made.


The patient remained stable while under my care.


[The patient appeared well at the time of discharge.]


[The patient will be evaluated for further management.]





Medical Decision


Differential Diagnosis includes but is not limited to dehydration, stroke, 

anemia, hypoglycemia, hyponatremia, hypernatremia, urinary tract infection, 

pneumonia, bronchitis, sepsis, gastroenteritis, additional abdominal pathology, 

metabolic abnormalities and infections.   





Patient is an 78-year-old female who presents to the ER the presents to the ER 

for intermittent abdominal pain that has been present for the past week 

associated with intermittent chest pain/left upper quadrant/left lower chest 

wall pain.  CBC was unremarkable.  BMP shows hyponatremia at 124.  CO2 is 

elevated slightly at 33.  Troponin was elevated at 0.058.  UA was negative.  

Flu was negative.  CTA of the chest shows pneumonia.  Patient was covered with 

IV antibiotics.  They are up to the bedside.  She was given IV fluids for 

hydration.  EKG does not suggest that this is a STEMI.  Case was discussed with 

internal medicine patient was admitted for further workup of her pneumonia 

along with her elevated troponin and dehydration causing hyponatremia.





Medication Reconcilliation


Current Medication List:  was personally reviewed by me





Blood Pressure Screening


Patient's blood pressure:  Elevated blood pressure


Blood pressure disposition:  Elevated BP felt to be situational





Consults


Time Called:  1136


Consulting Physician:  Dr. Chester SHEPHERD Hospitalist


Returned Call:  [phone]


I reviewed the patient's case with Dr. Briggs.  [facility] will evaluate the patient 

for further management.





Impression





 Primary Impression:  


 Pneumonia


 Additional Impressions:  


 Elevated troponin


 Hyponatremia


 Hypoxia





Scribe Attestation


The scribe's documentation has been prepared under my direction and personally 

reviewed by me in its entirety. I confirm that the note above accurately 

reflects all work, treatment, procedures, and medical decision making performed 

by me.





Departure Information


Dispostion


Being Evaluated By Hospitalist





Referrals


No Doctor, Assigned (PCP)





Patient Instructions


My [facility]





Problem Qualifiers








 Primary Impression:  


 Pneumonia


 Pneumonia type:  due to unspecified organism  Laterality:  unspecified 

laterality  Lung location:  unspecified part of lung  Qualified Codes:  J18.9 - 

Pneumonia, unspecified organism

## 2018-02-23 VITALS
OXYGEN SATURATION: 99 % | TEMPERATURE: 97.88 F | DIASTOLIC BLOOD PRESSURE: 80 MMHG | SYSTOLIC BLOOD PRESSURE: 149 MMHG | HEART RATE: 51 BPM

## 2018-02-23 VITALS
TEMPERATURE: 97.52 F | HEART RATE: 60 BPM | OXYGEN SATURATION: 98 % | SYSTOLIC BLOOD PRESSURE: 150 MMHG | DIASTOLIC BLOOD PRESSURE: 84 MMHG

## 2018-02-23 VITALS
SYSTOLIC BLOOD PRESSURE: 169 MMHG | DIASTOLIC BLOOD PRESSURE: 90 MMHG | OXYGEN SATURATION: 99 % | HEART RATE: 51 BPM | TEMPERATURE: 97.7 F

## 2018-02-23 VITALS
OXYGEN SATURATION: 98 % | HEART RATE: 50 BPM | DIASTOLIC BLOOD PRESSURE: 81 MMHG | SYSTOLIC BLOOD PRESSURE: 156 MMHG | TEMPERATURE: 97.7 F

## 2018-02-23 VITALS
DIASTOLIC BLOOD PRESSURE: 79 MMHG | SYSTOLIC BLOOD PRESSURE: 119 MMHG | TEMPERATURE: 97.52 F | HEART RATE: 54 BPM | OXYGEN SATURATION: 100 %

## 2018-02-23 VITALS
TEMPERATURE: 97.88 F | SYSTOLIC BLOOD PRESSURE: 126 MMHG | HEART RATE: 47 BPM | DIASTOLIC BLOOD PRESSURE: 78 MMHG | OXYGEN SATURATION: 98 %

## 2018-02-23 LAB
BUN SERPL-MCNC: 14 MG/DL (ref 7–18)
CALCIUM SERPL-MCNC: 8 MG/DL (ref 8.5–10.1)
CO2 SERPL-SCNC: 30 MMOL/L (ref 21–32)
CREAT SERPL-MCNC: 0.9 MG/DL (ref 0.6–1.2)
EOSINOPHIL NFR BLD AUTO: 177 K/UL (ref 130–400)
GLUCOSE SERPL-MCNC: 144 MG/DL (ref 70–99)
HCT VFR BLD CALC: 39.8 % (ref 37–47)
HGB BLD-MCNC: 13.7 G/DL (ref 12–16)
MCH RBC QN AUTO: 32.1 PG (ref 25–34)
MCHC RBC AUTO-ENTMCNC: 34.4 G/DL (ref 32–36)
MCV RBC AUTO: 93.2 FL (ref 80–100)
NRBC BLD AUTO-RTO: 1 %
NUCLEATED RED BLOOD CELL ABS: 0.04 K/UL (ref 0–0)
PMV BLD AUTO: 10 FL (ref 7.4–10.4)
POTASSIUM SERPL-SCNC: 4.9 MMOL/L (ref 3.5–5.1)
RED CELL DISTRIBUTION WIDTH CV: 14.4 % (ref 11.5–14.5)
RED CELL DISTRIBUTION WIDTH SD: 49.3 FL (ref 36.4–46.3)
SODIUM SERPL-SCNC: 125 MMOL/L (ref 136–145)
WBC # BLD AUTO: 3.85 K/UL (ref 4.8–10.8)

## 2018-02-23 RX ADMIN — ALUMINUM ZIRCONIUM TRICHLOROHYDREX GLY SCH EA: 0.2 STICK TOPICAL at 21:11

## 2018-02-23 RX ADMIN — IPRATROPIUM BROMIDE AND ALBUTEROL SULFATE SCH ML: .5; 3 SOLUTION RESPIRATORY (INHALATION) at 19:40

## 2018-02-23 RX ADMIN — Medication SCH TAB: at 08:23

## 2018-02-23 RX ADMIN — HEPARIN SODIUM SCH UNIT: 10000 INJECTION, SOLUTION INTRAVENOUS; SUBCUTANEOUS at 08:27

## 2018-02-23 RX ADMIN — PANTOPRAZOLE SCH MG: 40 TABLET, DELAYED RELEASE ORAL at 08:22

## 2018-02-23 RX ADMIN — HEPARIN SODIUM SCH UNIT: 10000 INJECTION, SOLUTION INTRAVENOUS; SUBCUTANEOUS at 20:51

## 2018-02-23 RX ADMIN — LOSARTAN POTASSIUM SCH MG: 25 TABLET ORAL at 20:50

## 2018-02-23 RX ADMIN — IPRATROPIUM BROMIDE AND ALBUTEROL SULFATE SCH ML: .5; 3 SOLUTION RESPIRATORY (INHALATION) at 01:35

## 2018-02-23 RX ADMIN — IPRATROPIUM BROMIDE AND ALBUTEROL SULFATE SCH ML: .5; 3 SOLUTION RESPIRATORY (INHALATION) at 14:38

## 2018-02-23 RX ADMIN — METHYLPREDNISOLONE SODIUM SUCCINATE SCH MLS/MIN: 1 INJECTION, POWDER, FOR SOLUTION INTRAMUSCULAR; INTRAVENOUS at 08:22

## 2018-02-23 RX ADMIN — DILTIAZEM HYDROCHLORIDE SCH MG: 120 CAPSULE, EXTENDED RELEASE ORAL at 20:51

## 2018-02-23 RX ADMIN — ALUMINUM ZIRCONIUM TRICHLOROHYDREX GLY SCH EA: 0.2 STICK TOPICAL at 14:44

## 2018-02-23 RX ADMIN — CLONAZEPAM SCH MG: 0.5 TABLET ORAL at 20:50

## 2018-02-23 RX ADMIN — IPRATROPIUM BROMIDE AND ALBUTEROL SULFATE SCH ML: .5; 3 SOLUTION RESPIRATORY (INHALATION) at 07:12

## 2018-02-23 RX ADMIN — SODIUM CHLORIDE AND POTASSIUM CHLORIDE SCH MLS/HR: 9; 1.49 INJECTION, SOLUTION INTRAVENOUS at 05:09

## 2018-02-23 RX ADMIN — Medication SCH TAB: at 20:50

## 2018-02-23 RX ADMIN — METHYLPREDNISOLONE SODIUM SUCCINATE SCH MLS/MIN: 1 INJECTION, POWDER, FOR SOLUTION INTRAMUSCULAR; INTRAVENOUS at 13:42

## 2018-02-23 RX ADMIN — PRAVASTATIN SODIUM SCH MG: 40 TABLET ORAL at 08:23

## 2018-02-23 RX ADMIN — ALUMINUM ZIRCONIUM TRICHLOROHYDREX GLY SCH EA: 0.2 STICK TOPICAL at 08:00

## 2018-02-23 NOTE — PROGRESS NOTE
Subjective


Date of Service:


Feb 23, 2018.


Subjective


Pt evaluation today including:  conversation w/ patient, physical exam, chart 

review, lab review


feeling better breathing better some sweats no fevers or chills


later had ear pain and vaginal itch


no chest pain





Problem List


Medical Problems:


(1) Closed fracture of left proximal humerus


Status: Acute  





(2) Contusion of left knee


Status: Acute  





(3) Elevated troponin


Status: Acute  





(4) Facial fracture


Status: Acute  





(5) Fracture of manubrium


Status: Acute  





(6) Hyponatremia


Status: Acute  





(7) Hypoxia


Status: Acute  





(8) Left sided chest pain


Status: Acute  





(9) Thoracic compression fracture


Status: Acute  











Review of Systems


all other ROS otherwise negative except for as above





Objective


Vital Signs











  Date Time  Temp Pulse Resp B/P (MAP) Pulse Ox O2 Delivery O2 Flow Rate FiO2


 


2/23/18 16:00      Nasal Cannula 2.0 


 


2/23/18 15:45 36.5 51 20 169/90 (116) 99 Nasal Cannula 2.0 


 


2/23/18 12:17 36.6 51 18 149/80 (103) 99 Room Air  


 


2/23/18 12:00      Nasal Cannula 2.0 


 


2/23/18 08:00      Nasal Cannula 2.0 


 


2/23/18 07:28 36.6 47 16 126/78 (94) 98 Nasal Cannula 2.0 


 


2/23/18 04:47 36.4 54 18 119/79 (92) 100   


 


2/23/18 03:15      Nasal Cannula 2.0 


 


2/23/18 00:00      Nasal Cannula 2.0 


 


2/22/18 23:33 36.7 54 19 117/75 (89) 96 Room Air  


 


2/22/18 20:00      Nasal Cannula 2.0 


 


2/22/18 19:33 36.5 73 20 129/75 (93) 97 Nasal Cannula 2.0 











Physical Exam


General Appearance:  no apparent distress


Eyes:  EOMI


ENT:  hearing grossly normal


Neck:  trachea midline


Respiratory/Chest:  no respiratory distress, no accessory muscle use, + 

decreased breath sounds (no r/r/w )


Extremities:  normal range of motion


Neurologic/Psychiatric:  CNs II-XII nml as tested, alert, normal mood/affect


Skin:  normal color, warm/dry





Laboratory Results





Last 24 Hours








Test


  2/22/18


19:59 2/23/18


05:29


 


Sodium Level 124 mmol/L  125 mmol/L 


 


Potassium Level 3.6 mmol/L  4.9 mmol/L 


 


Chloride Level 86 mmol/L  89 mmol/L 


 


Carbon Dioxide Level 31 mmol/L  30 mmol/L 


 


Anion Gap 7.0 mmol/L  6.0 mmol/L 


 


Blood Urea Nitrogen 12 mg/dl  14 mg/dl 


 


Creatinine 0.87 mg/dl  0.90 mg/dl 


 


Est Creatinine Clear Calc


Drug Dose 50.2 ml/min 


  48.5 ml/min 


 


 


Estimated GFR (


American) 72.9 


  70.0 


 


 


Estimated GFR (Non-


American 62.9 


  60.4 


 


 


BUN/Creatinine Ratio 14.1  15.2 


 


Random Glucose 109 mg/dl  144 mg/dl 


 


Calcium Level 7.9 mg/dl  8.0 mg/dl 


 


Troponin I 0.061 ng/ml  


 


White Blood Count  3.85 K/uL 


 


Red Blood Count  4.27 M/uL 


 


Hemoglobin  13.7 g/dL 


 


Hematocrit  39.8 % 


 


Mean Corpuscular Volume  93.2 fL 


 


Mean Corpuscular Hemoglobin  32.1 pg 


 


Mean Corpuscular Hemoglobin


Concent 


  34.4 g/dl 


 


 


RDW Standard Deviation  49.3 fL 


 


RDW Coefficient of Variation  14.4 % 


 


Platelet Count  177 K/uL 


 


Mean Platelet Volume  10.0 fL 


 


Nucleated RBC Absolute Count


(auto) 


  0.04 K/uL 


 


 


Nucleated Red Blood Cells %  1.0 % 


 


Magnesium Level  2.3 mg/dl 


 


Chemistry Specimen Hemolysis   











Assessment and Plan


79 y/o F Hx RA with ILD, HTN, DM, depression.  Presents with progressive 

weakness, SOB and LQ abdominal pain wrapping around to her back.  She describes 

nausea and an episode of diarrhea prior to arrival.  She denies CP, vomiting, 

fevers or dysuria.  A CT chest was obtained in the ER and is consistent with 

multilobar pneumonia.  





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  Due to underlying 

fibrosis and chronic steroid use, is on stress dose steroids





2) Nausea and diarrhea - improved, none further





3) Trop elevation - very mild and no elevation - mild demand ischemia





4) HTN - cont Atenolol, Diltiazem, Losartan, numbers reasonable





5) DM - likely steroid-induced - diet controlled presently, continue to follow





6) PAF - Cont Atenolol, Diltiazem - is not currently anticoagulated, continue 

to follow





7) RA - Placed on IV steroids, can cont Cimzia as outpt when recovers from PNM, 

change to PO steroids by tomorrow





8) Hyponatremia, hypokalemia - likely related to dehydration - replaced








Full code - Heparin prophylaxis

## 2018-02-24 VITALS
TEMPERATURE: 97.7 F | DIASTOLIC BLOOD PRESSURE: 66 MMHG | HEART RATE: 62 BPM | OXYGEN SATURATION: 99 % | SYSTOLIC BLOOD PRESSURE: 112 MMHG

## 2018-02-24 VITALS — HEART RATE: 52 BPM | OXYGEN SATURATION: 98 %

## 2018-02-24 VITALS
DIASTOLIC BLOOD PRESSURE: 70 MMHG | TEMPERATURE: 97.7 F | OXYGEN SATURATION: 100 % | SYSTOLIC BLOOD PRESSURE: 136 MMHG | HEART RATE: 50 BPM

## 2018-02-24 LAB
BUN SERPL-MCNC: 18 MG/DL (ref 7–18)
CALCIUM SERPL-MCNC: 7.8 MG/DL (ref 8.5–10.1)
CO2 SERPL-SCNC: 29 MMOL/L (ref 21–32)
CREAT SERPL-MCNC: 0.95 MG/DL (ref 0.6–1.2)
GLUCOSE SERPL-MCNC: 191 MG/DL (ref 70–99)
POTASSIUM SERPL-SCNC: 4.7 MMOL/L (ref 3.5–5.1)
SODIUM SERPL-SCNC: 122 MMOL/L (ref 136–145)

## 2018-02-24 RX ADMIN — Medication SCH TAB: at 09:00

## 2018-02-24 RX ADMIN — ALUMINUM ZIRCONIUM TRICHLOROHYDREX GLY SCH EA: 0.2 STICK TOPICAL at 09:13

## 2018-02-24 RX ADMIN — PANTOPRAZOLE SCH MG: 40 TABLET, DELAYED RELEASE ORAL at 09:12

## 2018-02-24 RX ADMIN — PRAVASTATIN SODIUM SCH MG: 40 TABLET ORAL at 09:00

## 2018-02-24 RX ADMIN — HEPARIN SODIUM SCH UNIT: 10000 INJECTION, SOLUTION INTRAVENOUS; SUBCUTANEOUS at 09:00

## 2018-02-24 RX ADMIN — IPRATROPIUM BROMIDE AND ALBUTEROL SULFATE SCH ML: .5; 3 SOLUTION RESPIRATORY (INHALATION) at 07:15

## 2018-02-24 RX ADMIN — IPRATROPIUM BROMIDE AND ALBUTEROL SULFATE SCH ML: .5; 3 SOLUTION RESPIRATORY (INHALATION) at 01:51

## 2018-02-24 NOTE — DISCHARGE INSTRUCTIONS
Discharge Instructions


Date of Service


Feb 24, 2018.





Admission


Reason for Admission:  Pneumonia, Troponin I Above Reference Range





Discharge


Discharge Diagnosis / Problem:  pneumonia





Discharge Goals


Goal(s):  Diagnostic testing, Therapeutic intervention





Activity Recommendations


Activity Limitations:  resume your previous activity





.





Instructions / Follow-Up


Instructions / Follow-Up


pneumonia


-this was the main illness that got you sick.  it is getting better nicely - 

typically when people have pulmonary fibrosis and then get a pneumonia on top 

of it, it can definitely make them sick enough to land in the hospital - but 

fortunately you're actually getting better far faster than the average person 

in this situation


-we'll finish a course of treatment with levaquin (levofloxacin) once a day for 

8 more days (10 days total treatment).  if it upsets your stomach, take it with 

food. also (very rarely) levaquin can make people more prone to tendonitis or 

even tendon rupture - so while this is exceedingly unlikely to happen, it's 

always safer to take it easy for the next month or so (avoiding heavy lifting, 

overhead lifting, repetitive movements etc)


-to keep the pulmonary fibrosis calmed down we'll do a course of prednisone - -

60mg x 2 days then 50mg x 2 days then 40mg x 2 days then 30mg x 2 days then 

20mg x 2 days then 10mg x 2 days


-we'll ask to get you in with Dr Bart Varela later this coming week to ensure you'

re getting better as we would expect





elevated troponin


-troponin is an enzyme specific to your heart muscle - we look at it as an 

assessment of strain or damage to the heart.  yours was very nominally elevated 

(0.066 at it's highest reading-  for perspective upper limits of normal is 0.045

, a true heart attack with give values of ~50.0) -- we see this frequently, and 

generally what it amounts to is the "common sense conclusion" that having the 

pneumonia superimposed on your pulmonary fibrosis "put you on a treadmill" that 

wasn't fair for the circumstances -- that the physiologic stress and low oxygen 

state that a pneumonia creates simply put strain on your heart.  when it's this 

low of an elevation, it doesn't amount to anything.  all we'd ask is that if 

you were to feel any new chest pain/pressure/shortness of breath that you would 

let Dr Bart Varela know - although this is really unlikely to be a problem





back pain and headache


-these both really fit with muscle tension - likely from being in a strange bed

, and being in bed far more than you're used to.  more than likely both the 

headache and back pain will simply go away over the next day or two with a 

return to "normal life."  if they don't, we'd recommend getting an appointment 

with Dr Milad Briceño - [facility] - who is a DO that does 

a lot of hands-on manipulative medicine.  typically this is a pretty effective 

and lasting way to treat these types of syndromes; although again it's most 

likely that this will just get better and go away on its own





rectal itching/burning


-since you've been previously diagnosed with a fungal infection - continue to 

use the clotrimazole cream four times a day - we'll ask that you get back in 

with dermatology (our navigator will try to get this set up for you) since derm 

saw this first and would be able to compare to  progress - but if we're 

not able to get you in soon enough, Dr Bart Varela will be able to assess and 

provide further guidance with this as well





Current Hospital Diet


Patient's current hospital diet: AHA Diet (Heart Healthy)





Discharge Diet


Recommended Diet:  AHA Diet (Heart Healthy)





Pending Studies


Studies pending at discharge:  no





Medical Emergencies








.


Who to Call and When:





Medical Emergencies:  If at any time you feel your situation is an emergency, 

please call 911 immediately.





.





Non-Emergent Contact


Non-Emergency issues call your:  Primary Care Provider





.


.








"Provider Documentation" section prepared by Torres Woody.








.





VTE Core Measure


Inpt VTE Proph given/why not?:  Unfractionated heparin SQ

## 2018-03-16 ENCOUNTER — HOSPITAL ENCOUNTER (EMERGENCY)
Dept: HOSPITAL 45 - C.EDB | Age: 78
Discharge: HOME | End: 2018-03-16
Payer: COMMERCIAL

## 2018-03-16 VITALS
WEIGHT: 145.51 LBS | HEIGHT: 67.01 IN | BODY MASS INDEX: 22.84 KG/M2 | BODY MASS INDEX: 22.84 KG/M2 | WEIGHT: 145.51 LBS | HEIGHT: 67.01 IN

## 2018-03-16 VITALS — OXYGEN SATURATION: 95 %

## 2018-03-16 VITALS — TEMPERATURE: 97.88 F

## 2018-03-16 VITALS — DIASTOLIC BLOOD PRESSURE: 82 MMHG | HEART RATE: 98 BPM | SYSTOLIC BLOOD PRESSURE: 111 MMHG | OXYGEN SATURATION: 98 %

## 2018-03-16 VITALS — HEART RATE: 73 BPM | OXYGEN SATURATION: 98 %

## 2018-03-16 DIAGNOSIS — Z87.891: ICD-10-CM

## 2018-03-16 DIAGNOSIS — Z88.6: ICD-10-CM

## 2018-03-16 DIAGNOSIS — Z79.899: ICD-10-CM

## 2018-03-16 DIAGNOSIS — Z88.2: ICD-10-CM

## 2018-03-16 DIAGNOSIS — M19.90: ICD-10-CM

## 2018-03-16 DIAGNOSIS — E11.9: ICD-10-CM

## 2018-03-16 DIAGNOSIS — Z80.9: ICD-10-CM

## 2018-03-16 DIAGNOSIS — J84.9: Primary | ICD-10-CM

## 2018-03-16 DIAGNOSIS — I10: ICD-10-CM

## 2018-03-16 DIAGNOSIS — Z87.01: ICD-10-CM

## 2018-03-16 DIAGNOSIS — Z88.0: ICD-10-CM

## 2018-03-16 DIAGNOSIS — I48.91: ICD-10-CM

## 2018-03-16 DIAGNOSIS — M06.9: ICD-10-CM

## 2018-03-16 DIAGNOSIS — Z83.3: ICD-10-CM

## 2018-03-16 LAB
ALBUMIN SERPL-MCNC: 2.7 GM/DL (ref 3.4–5)
ALP SERPL-CCNC: 118 U/L (ref 45–117)
ALT SERPL-CCNC: 59 U/L (ref 12–78)
AST SERPL-CCNC: 29 U/L (ref 15–37)
BASOPHILS # BLD: 0.01 K/UL (ref 0–0.2)
BASOPHILS NFR BLD: 0.1 %
BUN SERPL-MCNC: 16 MG/DL (ref 7–18)
CALCIUM SERPL-MCNC: 8.3 MG/DL (ref 8.5–10.1)
CO2 SERPL-SCNC: 34 MMOL/L (ref 21–32)
CREAT SERPL-MCNC: 0.85 MG/DL (ref 0.6–1.2)
EOS ABS #: 0.01 K/UL (ref 0–0.5)
EOSINOPHIL NFR BLD AUTO: 155 K/UL (ref 130–400)
GLUCOSE SERPL-MCNC: 179 MG/DL (ref 70–99)
HCT VFR BLD CALC: 35.6 % (ref 37–47)
HGB BLD-MCNC: 12 G/DL (ref 12–16)
IG#: 0.02 K/UL (ref 0–0.02)
IMM GRANULOCYTES NFR BLD AUTO: 8.3 %
INFLUENZA B ANTIGEN: (no result)
INR PPP: 1 (ref 0.9–1.1)
LIPASE: 187 U/L (ref 73–393)
LYMPHOCYTES # BLD: 0.91 K/UL (ref 1.2–3.4)
MCH RBC QN AUTO: 32.8 PG (ref 25–34)
MCHC RBC AUTO-ENTMCNC: 33.7 G/DL (ref 32–36)
MCV RBC AUTO: 97.3 FL (ref 80–100)
MONO ABS #: 0.75 K/UL (ref 0.11–0.59)
MONOCYTES NFR BLD: 6.8 %
NEUT ABS #: 9.27 K/UL (ref 1.4–6.5)
NEUTROPHILS # BLD AUTO: 0.1 %
NEUTROPHILS NFR BLD AUTO: 84.5 %
PMV BLD AUTO: 9.7 FL (ref 7.4–10.4)
POTASSIUM SERPL-SCNC: 4.6 MMOL/L (ref 3.5–5.1)
PROT SERPL-MCNC: 7 GM/DL (ref 6.4–8.2)
RED CELL DISTRIBUTION WIDTH CV: 14.6 % (ref 11.5–14.5)
RED CELL DISTRIBUTION WIDTH SD: 52.2 FL (ref 36.4–46.3)
SODIUM SERPL-SCNC: 127 MMOL/L (ref 136–145)
WBC # BLD AUTO: 10.97 K/UL (ref 4.8–10.8)

## 2018-03-16 NOTE — DIAGNOSTIC IMAGING REPORT
(CHEST FOR PE) ANGIO WITH



CT DOSE: 640.41 mGycm



HISTORY:  78 years-old Female dense with acute shortness of breath and chest

pain



TECHNIQUE: Multiple CTA images of the chest were obtained after the intravenous

administration of 116 ml Optiray 320.  Coronal and sagittal MIPS were obtained

from the axial data set and were submitted for review.  A dose lowering

technique was utilized adhering to the principles of ALARA.



COMPARISON: Chest radiograph 3/16/2018, CTA chest 2/22/2018.



FINDINGS: 



CTA:  

At least moderate multichamber cardiac enlargement. Extensive coronary arterial

disease. Thoracic aorta is normal in both course and caliber with moderate

atherosclerosis. The imaged great vessels appear to be patent. No evidence of

thoracic aortic aneurysm or dissection. The pulmonary arterial tree is opacified

to level of the subsegmental branches, however the subsegmental branches are not

well-seen secondary to respiratory motion. No focal filling defects identified

to suggest pulmonary thromboembolic disease. There is reflux of contrast into

the IVC and hepatic veins.





CT CHEST:  

No dominant thyroid nodule identified. Enlarged subcarinal adenopathy measures

up to 1.2 cm in short axis. Prominent AP window and precarinal lymph nodes

measure up to 7 mm in short axis. 9 mm right paratracheal lymph node. These are

likely reactive and appear generally unchanged from comparison.



There are moderate sized bilateral pleural effusions. Chronic subpleural

reticular opacities are again seen within the lungs bilaterally compatible with

interstitial lung disease. Subpleural cystic changes within a lower lung zone

predominant distribution are also redemonstrated. Evaluation of the lungs is

limited secondary to respiratory motion. There is bilateral interlobular septal

thickening with patchy multifocal groundglass and consolidative opacities

throughout all lobes bilaterally within a mid and lower lung zone predominant

distribution. Fluid and groundglass opacities with air along the fissures.

Central airways appear to be patent.



Mild nonspecific bilateral perinephric stranding. No acute abnormality of the

imaged upper abdomen identified. Soft tissues are unremarkable. The bones appear

mildly demineralized. Multilevel advanced degenerative changes of the spine.

Chronic mild anterior compression deformities again noted involving the T6 and

T8 vertebral bodies. Remote fracture of the manubrium and left humerus.





IMPRESSION:  

1. No acute aortic pathology or evidence of pulmonary thromboembolic disease.

2. Cardiomegaly with moderate sized bilateral pleural effusions. Interlobular

septal thickening with bilateral consolidative and groundglass opacities within

a mid and lower lung zone predominant distribution suggest interstitial and

alveolar pulmonary edema. Superimposed infectious or inflammatory pneumonitis

would be difficult to exclude.

3. Background interstitial lung disease.

4. Unchanged mild mediastinal adenopathy, likely reactive.







The above report was generated using voice recognition software. It may contain

grammatical, syntax or spelling errors.







Electronically signed by:  Domenico Sloan M.D.

3/16/2018 5:05 PM



Dictated Date/Time:  3/16/2018 4:54 PM

## 2018-03-16 NOTE — DIAGNOSTIC IMAGING REPORT
CHEST ONE VIEW PORTABLE



HISTORY:  78 years-old Female CHEST PAIN acute atypical chest pain



COMPARISON: Chest radiograph and CTA chest 2/22/2018



TECHNIQUE: Portable AP view of the chest



FINDINGS: 

Cardiac silhouette is again enlarged, unchanged. Atherosclerosis of the aorta.

Chronic interstitial lung disease redemonstrated without pneumothorax.

Additionally, there is interval development of new mixed interstitial and

alveolar opacities within a mid and lower lung zone predominant distribution

with small bilateral pleural effusions. Bones of the chest appear grossly

intact. Mild levoscoliosis of the thoracic spine. Healed chronic fracture

deformity of the left proximal humerus. Degenerative changes are seen throughout

the shoulders and spine.



IMPRESSION: 

1. Cardiomegaly with new mixed interstitial and alveolar opacities within a mid

and lower lung zone predominant distribution with small bilateral pleural

effusions suggesting pulmonary edema or less likely multifocal pneumonia.



2. Background chronic interstitial lung disease. 







The above report was generated using voice recognition software. It may contain

grammatical, syntax or spelling errors.







Electronically signed by:  Domenico Sloan M.D.

3/16/2018 3:22 PM



Dictated Date/Time:  3/16/2018 3:18 PM

## 2018-03-16 NOTE — EMERGENCY ROOM VISIT NOTE
History


Report prepared by Irvin:  Jonathon Ross


Under the Supervision of:  Dr. Raphael Cisneros M.D.


First contact with patient:  14:30


Chief Complaint:  RESPIRATORY PROBLEMS


Stated Complaint:  SHORTNESS OF BREATH


Nursing Triage Summary:  


triage note:





Pt reports "i was sitting in my recliner this morning with my usual amount of 


oxygen and felt short of breath."





pt reports she wears 2 liters of oxygen.





pt reports shortness of breath while sitting.





pt reports bilat feet swelling.





History of Present Illness


The patient is an 78 year old female who presents to the Emergency Room with 

complaints of persistent shortness of breath since 1000 this morning. She 

denies any fevers or chills, though notes that she has been colder than normal. 

She notes her feet became swollen and cold two nights ago. She states that last 

night she took her acid reflux medication one hour prior to eating, though 

after she ate she coughed up a significant amount of mucus.  She recently had 

pneumonia about one month ago. She has a history of palpitations and irregular 

rhythm. She has been on 2L oxygen via [place] for two months. She does not feel as 

though 2L is enough. She is a former smoker of 50 years ago. She has rheumatoid 

arthritis and osteoarthritis. She receives a shot for her arthritis every two 

weeks. She is regularly taking 10 mg of Prednisone for the arthritis. She is 

taking blood pressure medication. She denies taking any antibiotics. She denies 

taking any blood thinners or water pills. She notes pain in her left heel, 

which she describes is really sore. She states that she has been putting cream 

on it, though no relief.





   Source of History:  patient


   Onset:  since 1000 this morning


   Position:  other (general-lungs)


   Quality:  other (shortness of breath)


   Timing:  other (persistent)


   Associated Symptoms:  + cough, No fevers, No chills


Note:


She notes increased coldness and bilateral swollen feet.





Review of Systems


See HPI for pertinent positives and negatives.  A total of ten systems were 

reviewed and were otherwise negative.





Past Medical & Surgical


Medical Problems:


(1) Benign tumor of breast


(2) Diabetes


(3) FALL, MULTIPLE FX


(4) Heart disease


(5) Hypertension


(6) Migraines


(7) Pneumonia


(8) Rheumatoid arthritis


(9) Troponin I above reference range








Family History





Cancer


Diabetes mellitus


Gallbladder disease


Heart disease


Lung disease





Social History


Smoking Status:  Former Smoker


Alcohol Use:  none


Marital Status:  


Housing Status:  lives alone


Occupation Status:  retired





Current/Historical Medications


Scheduled


Atenolol (Tenormin), 25 MG PO QPM


Calcium/Vitamin D (Os-Manny 500 Plus D), 1 TAB PO BID


Diltiazem Hcl Ext Rel (Tiazac), 240 MG PO QPM


Estropipate (Estropipate), 0.75 MG PO QPM


Losartan Potassium (Cozaar), 25 MG PO QPM


Medroxyprogesterone (Provera), 2.5 MG PO QPM


Ocuvite Preservision (Ocuvite Preservision), 1 TAB PO BID


Pantoprazole (Pantoprazole Sodium), 40 MG PO QDL


Pravastatin Sodium (Pravastatin Sodium), 80 MG PO QPM


Prednisone (Prednisone), 10 MG PO QAM


Zinc Oxide (Topical) (Triple Paste), 1 APPLN TD BID





Scheduled PRN


Acetamin/Butalbital/Caffeine (Fioricet), 1 TAB PO UD PRN for Migraine


Clonazepam (Klonopin), 0.5 MG PO HS PRN for Sleep





Allergies


Coded Allergies:  


     Amoxicillin (Verified  Allergy, Unknown, UNKNOWN, 3/16/18)


     Clavulanic Acid (Verified  Allergy, Unknown, UNKNOWN, 3/16/18)


     Clindamycin (Verified  Allergy, Unknown, UNKNOWN, 3/16/18)


     Penicillins (Verified  Allergy, Unknown, UNKNOWN, 3/16/18)


     Sulfa Antibiotics (Verified  Adverse Reaction, Intermediate, NAUSEA, 3/16/

18)


     Aspirin (Verified  Adverse Reaction, Mild, GI SYMPTOMS, 3/16/18)


Uncoded Allergies:  


     ARTIFICIAL SWEETNER (Allergy, Mild, TONGUE SORE, 5/9/13)





Physical Exam


Vital Signs











  Date Time  Temp Pulse Resp B/P (MAP) Pulse Ox O2 Delivery O2 Flow Rate FiO2


 


3/16/18 19:48  98 20 111/82 98   


 


3/16/18 17:13  104 18 107/77 97 Nasal Cannula 4.0 


 


3/16/18 15:40  71 18 163/100 99 Nebulizer 6.0 


 


3/16/18 15:21  73 20  98 Nasal Cannula 2.5 


 


3/16/18 15:06     95 Nasal Cannula 2.0 


 


3/16/18 15:06     95 Nasal Cannula 2.0 


 


3/16/18 14:15 36.6 103 22 117/72 94 Nasal Cannula 2.0 











Physical Exam


GENERAL: Awake, alert, chronically ill-appearing, mildly dyspneic but in NAD.


HENT: Normocephalic, atraumatic. Oropharynx dry cracked mucus membranes.


EYES: Normal conjunctiva. Sclera non-icteric.


NECK: Supple. No nuchal rigidity. FROM. No JVD.


RESPIRATORY: Diminished breath sounds at the bases, scattered left sided 

wheezes.


CARDIAC: IRIR. Extremities warm and well perfused. Pulses equal.


ABDOMEN: Soft, non-distended. No tenderness to palpation. No rebound or 

guarding. No masses.


RECTAL: Deferred.


MUSCULOSKELETAL: Chest examination reveals no tenderness. The back is 

symmetrical on inspection without obvious abnormality. There is no CVA 

tenderness to palpation. No joint edema. 


LOWER EXTREMITIES: Calves are equal size bilaterally and non-tender. Scant LE 

edema. No discoloration. 


NEURO: Normal sensorium. No sensory or motor deficits noted. 


SKIN: No rash or jaundice noted.





Medical Decision & Procedures


ER Provider


Diagnostic Interpretation:


Radiology results as stated below per my review and radiologist interpretation: 





CHEST ONE VIEW PORTABLE





HISTORY:  78 years-old Female CHEST PAIN acute atypical chest pain





COMPARISON: Chest radiograph and CTA chest 2/22/2018





TECHNIQUE: Portable AP view of the chest





FINDINGS: 


Cardiac silhouette is again enlarged, unchanged. Atherosclerosis of the aorta.


Chronic interstitial lung disease redemonstrated without pneumothorax.


Additionally, there is interval development of new mixed interstitial and


alveolar opacities within a mid and lower lung zone predominant distribution


with small bilateral pleural effusions. Bones of the chest appear grossly


intact. Mild levoscoliosis of the thoracic spine. Healed chronic fracture


deformity of the left proximal humerus. Degenerative changes are seen throughout


the shoulders and spine.





IMPRESSION: 


1. Cardiomegaly with new mixed interstitial and alveolar opacities within a mid


and lower lung zone predominant distribution with small bilateral pleural


effusions suggesting pulmonary edema or less likely multifocal pneumonia.





2. Background chronic interstitial lung disease. 











The above report was generated using voice recognition software. It may contain


grammatical, syntax or spelling errors.











Electronically signed by:  Domenico Sloan M.D.


3/16/2018 3:22 PM





Dictated Date/Time:  3/16/2018 3:18 PM











(CHEST FOR PE) ANGIO WITH





CT DOSE: 640.41 mGycm





HISTORY:  78 years-old Female dense with acute shortness of breath and chest


pain





TECHNIQUE: Multiple CTA images of the chest were obtained after the intravenous


administration of 116 ml Optiray 320.  Coronal and sagittal MIPS were obtained


from the axial data set and were submitted for review.  A dose lowering


technique was utilized adhering to the principles of ALARA.





COMPARISON: Chest radiograph 3/16/2018, CTA chest 2/22/2018.





FINDINGS: 





CTA:  


At least moderate multichamber cardiac enlargement. Extensive coronary arterial


disease. Thoracic aorta is normal in both course and caliber with moderate


atherosclerosis. The imaged great vessels appear to be patent. No evidence of


thoracic aortic aneurysm or dissection. The pulmonary arterial tree is opacified


to level of the subsegmental branches, however the subsegmental branches are not


well-seen secondary to respiratory motion. No focal filling defects identified


to suggest pulmonary thromboembolic disease. There is reflux of contrast into


the IVC and hepatic veins.








CT CHEST:  


No dominant thyroid nodule identified. Enlarged subcarinal adenopathy measures


up to 1.2 cm in short axis. Prominent AP window and precarinal lymph nodes


measure up to 7 mm in short axis. 9 mm right paratracheal lymph node. These are


likely reactive and appear generally unchanged from comparison.





There are moderate sized bilateral pleural effusions. Chronic subpleural


reticular opacities are again seen within the lungs bilaterally compatible with


interstitial lung disease. Subpleural cystic changes within a lower lung zone


predominant distribution are also redemonstrated. Evaluation of the lungs is


limited secondary to respiratory motion. There is bilateral interlobular septal


thickening with patchy multifocal groundglass and consolidative opacities


throughout all lobes bilaterally within a mid and lower lung zone predominant


distribution. Fluid and groundglass opacities with air along the fissures.


Central airways appear to be patent.





Mild nonspecific bilateral perinephric stranding. No acute abnormality of the


imaged upper abdomen identified. Soft tissues are unremarkable. The bones appear


mildly demineralized. Multilevel advanced degenerative changes of the spine.


Chronic mild anterior compression deformities again noted involving the T6 and


T8 vertebral bodies. Remote fracture of the manubrium and left humerus.








IMPRESSION:  


1. No acute aortic pathology or evidence of pulmonary thromboembolic disease.


2. Cardiomegaly with moderate sized bilateral pleural effusions. Interlobular


septal thickening with bilateral consolidative and groundglass opacities within


a mid and lower lung zone predominant distribution suggest interstitial and


alveolar pulmonary edema. Superimposed infectious or inflammatory pneumonitis


would be difficult to exclude.


3. Background interstitial lung disease.


4. Unchanged mild mediastinal adenopathy, likely reactive.











The above report was generated using voice recognition software. It may contain


grammatical, syntax or spelling errors.











Electronically signed by:  Domenico Sloan M.D.


3/16/2018 5:05 PM





Dictated Date/Time:  3/16/2018 4:54 PM





Laboratory Results


3/16/18 15:54








Red Blood Count 3.66, Mean Corpuscular Volume 97.3, Mean Corpuscular Hemoglobin 

32.8, Mean Corpuscular Hemoglobin Concent 33.7, Mean Platelet Volume 9.7, 

Neutrophils (%) (Auto) 84.5, Lymphocytes (%) (Auto) 8.3, Monocytes (%) (Auto) 

6.8, Eosinophils (%) (Auto) 0.1, Basophils (%) (Auto) 0.1, Neutrophils # (Auto) 

9.27, Lymphocytes # (Auto) 0.91, Monocytes # (Auto) 0.75, Eosinophils # (Auto) 

0.01, Basophils # (Auto) 0.01





3/16/18 15:54

















Test


  3/16/18


15:15 3/16/18


15:54 3/16/18


15:57 3/16/18


19:20


 


Influenza Type A Antigen


  Neg for Influ


A (NEG) 


  


  


 


 


Influenza Type B Antigen


  Neg for Influ


B (NEG) 


  


  


 


 


White Blood Count


  


  10.97 K/uL


(4.8-10.8) 


  


 


 


Red Blood Count


  


  3.66 M/uL


(4.2-5.4) 


  


 


 


Hemoglobin


  


  12.0 g/dL


(12.0-16.0) 


  


 


 


Hematocrit  35.6 % (37-47)   


 


Mean Corpuscular Volume


  


  97.3 fL


() 


  


 


 


Mean Corpuscular Hemoglobin


  


  32.8 pg


(25-34) 


  


 


 


Mean Corpuscular Hemoglobin


Concent 


  33.7 g/dl


(32-36) 


  


 


 


Platelet Count


  


  155 K/uL


(130-400) 


  


 


 


Mean Platelet Volume


  


  9.7 fL


(7.4-10.4) 


  


 


 


Neutrophils (%) (Auto)  84.5 %   


 


Lymphocytes (%) (Auto)  8.3 %   


 


Monocytes (%) (Auto)  6.8 %   


 


Eosinophils (%) (Auto)  0.1 %   


 


Basophils (%) (Auto)  0.1 %   


 


Neutrophils # (Auto)


  


  9.27 K/uL


(1.4-6.5) 


  


 


 


Lymphocytes # (Auto)


  


  0.91 K/uL


(1.2-3.4) 


  


 


 


Monocytes # (Auto)


  


  0.75 K/uL


(0.11-0.59) 


  


 


 


Eosinophils # (Auto)


  


  0.01 K/uL


(0-0.5) 


  


 


 


Basophils # (Auto)


  


  0.01 K/uL


(0-0.2) 


  


 


 


RDW Standard Deviation


  


  52.2 fL


(36.4-46.3) 


  


 


 


RDW Coefficient of Variation


  


  14.6 %


(11.5-14.5) 


  


 


 


Immature Granulocyte % (Auto)  0.2 %   


 


Immature Granulocyte # (Auto)


  


  0.02 K/uL


(0.00-0.02) 


  


 


 


Prothrombin Time


  


  11.0 SECONDS


(9.0-12.0) 


  


 


 


Prothromb Time International


Ratio 


  1.0 (0.9-1.1) 


  


  


 


 


Anion Gap


  


  4.0 mmol/L


(3-11) 


  


 


 


Est Creatinine Clear Calc


Drug Dose 


  51.3 ml/min 


  


  


 


 


Estimated GFR (


American) 


  75.0 


  


  


 


 


Estimated GFR (Non-


American 


  64.7 


  


  


 


 


BUN/Creatinine Ratio  19.2 (10-20)   


 


Calcium Level


  


  8.3 mg/dl


(8.5-10.1) 


  


 


 


Magnesium Level


  


  1.9 mg/dl


(1.8-2.4) 


  


 


 


Total Bilirubin


  


  0.5 mg/dl


(0.2-1) 


  


 


 


Direct Bilirubin


  


  0.2 mg/dl


(0-0.2) 


  


 


 


Aspartate Amino Transf


(AST/SGOT) 


  29 U/L (15-37) 


  


  


 


 


Alanine Aminotransferase


(ALT/SGPT) 


  59 U/L (12-78) 


  


  


 


 


Alkaline Phosphatase


  


  118 U/L


() 


  


 


 


Troponin I


  


  0.017 ng/ml


(0-0.045) 


  


 


 


Pro-B-Type Natriuretic Peptide


  


  3414 pg/ml


(0-1800) 


  


 


 


Total Protein


  


  7.0 gm/dl


(6.4-8.2) 


  


 


 


Albumin


  


  2.7 gm/dl


(3.4-5.0) 


  


 


 


Lipase


  


  187 U/L


() 


  


 


 


Osmolality


  


  


  274 mOsm/kg


(280-300) 


 


 


Procalcitonin


  


  


  < 0.05 ng/ml


(0-0.5) 


 


 


Urine Color    YELLOW 


 


Urine Appearance    CLEAR (CLEAR) 


 


Urine pH    6.0 (4.5-7.5) 


 


Urine Specific Gravity


  


  


  


  1.041


(1.000-1.030)


 


Urine Protein    TRACE (NEG) 


 


Urine Glucose (UA)    TRACE (NEG) 


 


Urine Ketones    NEG (NEG) 


 


Urine Occult Blood    NEG (NEG) 


 


Urine Nitrite    NEG (NEG) 


 


Urine Bilirubin    NEG (NEG) 


 


Urine Urobilinogen    NEG (NEG) 


 


Urine Leukocyte Esterase    NEG (NEG) 


 


Urine WBC (Auto)    1-5 /hpf (0-5) 


 


Urine RBC (Auto)    0-4 /hpf (0-4) 


 


Urine Hyaline Casts (Auto)    0 /lpf (0-5) 


 


Urine Epithelial Cells (Auto)    >30 /lpf (0-5) 


 


Urine Bacteria (Auto)    NEG (NEG) 


 


Urine Osmolality


  


  


  


  356 mOms/kg


(500-800)





Laboratory results reviewed by me





Medications Administered











 Medications


  (Trade)  Dose


 Ordered  Sig/Walter


 Route  Start Time


 Stop Time Status Last Admin


Dose Admin


 


 Methylprednisolone


 Sodium Succinate


  (Solu-Medrol IV)  125 mg  NOW  STAT


 IV  3/16/18 15:00


 3/16/18 15:04 DC 3/16/18 15:39


125 MG


 


 Albuterol/


 Ipratropium


  (Duoneb)  12 ml  ONE  ONCE


 INH  3/16/18 15:00


 3/16/18 15:04 DC 3/16/18 15:19


12 ML


 


 Sodium Chloride  500 ml @ 


 999 mls/hr  Q31M STAT


 IV  3/16/18 15:00


 3/16/18 15:30 DC 3/16/18 15:00


999 MLS/HR











ECG Per My Interpretation


Indication:  SOB/dyspnea


Rate (beats per minute):  87


Rhythm:  atrial fibrillation


Findings:  no acute ischemic change, other (Normal axis)





ED Course


1454: The patient was evaluated in room C10. A complete history and physical 

exam was performed.





1724: I reassessed the patient at this time. She is unsure if her breathing has 

improved. 





1744: [place] [facility] [place] discussed the patient's 

case. The patient will be evaluated by the [facility] for 

further management.





Medical Decision


I reviewed the patient's past medical history, medications, and the nursing 

notes as described above.





Differential diagnosis:


Etiologies such as infections, reactive airway disease, pneumonia, pneumothorax

, COPD, CHF, cardiac ischemia, pulmonary embolism, musculoskeletal, 

gastrointestinal, as well as others were entertained.





The patient is a 78-year-old woman with a past medical history of RA, PAF not 

on AC, dCHF, interstitial lung disease with a recent admission in February for 

multilobular pneumonia who presents emergency department with worsening 

shortness of breath on her baseline O2 at home per hpi.  Rather the patient is 

chronically ill-appearing and dyspneic but in no acute distress, afebrile 

stable vital signs.  The patient, additionally notes that, the night prior she 

had a transient swelling of bilateral lower extremities preceding her worsening 

shortness of breath.  EKG, afib rate controlled negative for acute ischemia.  

Troponin 0.0137 which is decreased from her recent admission.  BNP is in the 

3000s however no prior values for comparison. WBC 10 in the setting of chronic 

prednisone. Sodium 127 improved from her discharge. CT PE was done given the 

patient's history of leg swelling preceding her symptoms and was negative for 

PE there are bibasilar infiltrates that appear consistent with pulmonary edema 

however could be infectious as well.  Otherwise given the patient's abrupt 

onset of symptoms in the setting of being afebrile, unlikely to be infectious 

at this time.  However, will order a pro-calcitonin to help inform antibiotics.

  Otherwise, the patient did feel improved after continuous nebulizer and 

steroids.  However, while wavering, the patient ultimately reports that she has 

had a progressive worsening of her shortness of breath since her discharge and 

is concerned that if she were to go home she would have another episode similar 

today where she felt like she could not breathe.  Given this, it is reasonable 

to admit the patient to optimize her respiratory status.  Case was discussed 

[place]. Hare[place] [facility] [place] will evaluate the patient for further 

management.


Upon hospitalist evaluation the patient had discussed further with her son and 

they realized that perhaps her shortness of breath is actually stable since her 

discharge and rather maybe her acute shortness of breath episode was due to 

mouth breathing when she was asleep.  Thus she prefers discharge at this time 

and we agree we will give her facemask to avoid repeat episodes during sleep.  

She will follow-up with her medical supply company for additional facemasks as 

needed.  Otherwise, prolactin within normal limits thus unlikely to have any 

acute infection at this time. Findings and plan for follow-up reviewed with 

patient. Patient agreeable and d/c'd per discharge instructions.





Medication Reconcilliation


Current Medication List:  was personally reviewed by me





Blood Pressure Screening


Patient's blood pressure:  Elevated blood pressure


Blood pressure disposition:  Elevated BP felt to be situational





Consults


Time Called:  [id number]


Consulting Physician:  JOAO Phan hospitalist


Returned Call:  [phone]


I spoke with JOAO Phan hospitalist. We discussed the patient's case. 

The patient will be evaluated by the [facility] for further 

management.





Impression





 Primary Impression:  


 Interstitial lung disease


 Additional Impression:  


 Shortness of breath





Scribe Attestation


The scribe's documentation has been prepared under my direction and personally 

reviewed by me in its entirety. I confirm that the note above accurately 

reflects all work, treatment, procedures, and medical decision making performed 

by me.





Departure Information


Dispostion


Home / Self-Care





Referrals


Ezra Hinson M.D. (PCP)





Patient Instructions


ED Dyspnea Shortness of Breath, Interstitial Lung Disease Coping, [facility]





Additional Instructions





Please follow up with your primary care physician on Monday for re-evaluation.





Your exam, EKG, chest xray, and lab results did not show any significant change 

from recent or signs of an emergent condition at this time.





Continue your current medications as prescribed.


When sleeping use the oxygen face mask provided to avoid low oxygen due to 

mouth breathing.


Drink plenty of fluids to ensure hydration.





Return to the emergency department for worsening symptoms as described in the 

accompanying instructions.





Problem Qualifiers

## 2018-03-16 NOTE — MEDICAL CONSULT
Consultation


Date of Consultation:


Mar 16, 2018.


Attending Physician:





Reason for Consultation:


Possible admission


History of Present Illness


77 y/o F who presented to the ED due to SOB.  Pt states that she was sleeping 

with her O2 on and woke up suddenly SOB.  She called the O2 company and they 

brought her more O2.  She became panicked that this would happen again and came 

to the ED.  While in the ED, pt fell asleep and son noted that she was 

breathing through her mouth.  She woke with SOB at that time as well.  She 

thinks that she sleeps with her mouth open and this causes her to not get 

enough O2.  She does not have an O2 mask at home, just the NC.  She feels that 

she has no SOB when she is awake.  Pt denies fever, chest pain, abd pain, n/v/c/

d, LE pain. She does get LE swelling at times and has some today that is around 

baseline.  She has been eating well. 





Pt was d/c'd on 2/24 after being tx at [facility] for PNA in the setting of 

interstitial lung disease.  She felt she had been doing well at home until 

today. Son is present and agrees.





Discussed with Dr. Cisneros.  We will be able to get pt an O2 mask for home on d/c 

from the ED.  Pt would prefer to go home with this plan.





Pt would like her urine checked before she goes as she feels that urinating 

twice during the day is an increase for her. She has no burning or incontinence.





Past Medical/Surgical History


Medical Problems:


(1) Closed fracture of left proximal humerus


Status: Acute  





(2) Contusion of left knee


Status: Acute  





(3) Facial fracture


Status: Acute  





(4) Fracture of manubrium


Status: Acute  





(5) Interstitial lung disease


Status: Acute  





(6) Left sided chest pain


Status: Acute  





(7) Shortness of breath


Status: Acute  





(8) Thoracic compression fracture


Status: Acute  





DM


RA








Family History





Family history was reviewed; no changes noted.





Social History


Smoking Status:  Former Smoker


Alcohol Use:  none


Drug Use:  none


Marital Status:  


Housing Status:  lives alone


Occupation Status:  retired





Allergies


Coded Allergies:  


     Amoxicillin (Verified  Allergy, Unknown, UNKNOWN, 3/16/18)


     Clavulanic Acid (Verified  Allergy, Unknown, UNKNOWN, 3/16/18)


     Clindamycin (Verified  Allergy, Unknown, UNKNOWN, 3/16/18)


     Penicillins (Verified  Allergy, Unknown, UNKNOWN, 3/16/18)


     Sulfa Antibiotics (Verified  Adverse Reaction, Intermediate, NAUSEA, 3/16/

18)


     Aspirin (Verified  Adverse Reaction, Mild, GI SYMPTOMS, 3/16/18)


Uncoded Allergies:  


     ARTIFICIAL SWEETNER (Allergy, Mild, TONGUE SORE, 5/9/13)





Current Inpatient Medications





Current Inpatient Medications








 Medications


  (Trade)  Dose


 Ordered  Sig/Walter


 Route  Start Time


 Stop Time Status Last Admin


Dose Admin


 


 Ioversol


  (Optiray 320)  100 ml  UD  PRN


 IV  3/16/18 15:30


 3/20/18 15:29   


 











Review of Systems


Pertinent positives and negatives reviewed in HPI--all others negative





Physical Exam











  Date Time  Temp Pulse Resp B/P (MAP) Pulse Ox O2 Delivery O2 Flow Rate FiO2


 


3/16/18 19:48  98 20 111/82 98   


 


3/16/18 17:13  104 18 107/77 97 Nasal Cannula 4.0 


 


3/16/18 15:40  71 18 163/100 99 Nebulizer 6.0 


 


3/16/18 15:21  73 20  98 Nasal Cannula 2.5 


 


3/16/18 15:06     95 Nasal Cannula 2.0 


 


3/16/18 15:06     95 Nasal Cannula 2.0 


 


3/16/18 14:15 36.6 103 22 117/72 94 Nasal Cannula 2.0 








General Appearance:  WD/WN, no apparent distress


Head:  normocephalic, atraumatic


Eyes:  normal inspection, sclerae normal


Respiratory/Chest:  no respiratory distress, + crackles


Cardiovascular:  regular rate, rhythm, normal peripheral pulses


Abdomen/GI:  non tender, soft


Extremities/Musculoskelatal:  no calf tenderness, + pedal edema (1+ pitting)


Neurologic/Psych:  alert, normal mood/affect, oriented x 3


Skin:  normal color, warm/dry





Laboratory Results





Last 24 Hours








Test


  3/16/18


15:15 3/16/18


15:54 3/16/18


15:57 3/16/18


19:20


 


Influenza Type A Antigen


  Neg for Influ


A 


  


  


 


 


Influenza Type B Antigen


  Neg for Influ


B 


  


  


 


 


White Blood Count  10.97 K/uL   


 


Red Blood Count  3.66 M/uL   


 


Hemoglobin  12.0 g/dL   


 


Hematocrit  35.6 %   


 


Mean Corpuscular Volume  97.3 fL   


 


Mean Corpuscular Hemoglobin  32.8 pg   


 


Mean Corpuscular Hemoglobin


Concent 


  33.7 g/dl 


  


  


 


 


Platelet Count  155 K/uL   


 


Mean Platelet Volume  9.7 fL   


 


Neutrophils (%) (Auto)  84.5 %   


 


Lymphocytes (%) (Auto)  8.3 %   


 


Monocytes (%) (Auto)  6.8 %   


 


Eosinophils (%) (Auto)  0.1 %   


 


Basophils (%) (Auto)  0.1 %   


 


Neutrophils # (Auto)  9.27 K/uL   


 


Lymphocytes # (Auto)  0.91 K/uL   


 


Monocytes # (Auto)  0.75 K/uL   


 


Eosinophils # (Auto)  0.01 K/uL   


 


Basophils # (Auto)  0.01 K/uL   


 


RDW Standard Deviation  52.2 fL   


 


RDW Coefficient of Variation  14.6 %   


 


Immature Granulocyte % (Auto)  0.2 %   


 


Immature Granulocyte # (Auto)  0.02 K/uL   


 


Prothrombin Time  11.0 SECONDS   


 


Prothromb Time International


Ratio 


  1.0 


  


  


 


 


Sodium Level  127 mmol/L   


 


Potassium Level  4.6 mmol/L   


 


Chloride Level  89 mmol/L   


 


Carbon Dioxide Level  34 mmol/L   


 


Anion Gap  4.0 mmol/L   


 


Blood Urea Nitrogen  16 mg/dl   


 


Creatinine  0.85 mg/dl   


 


Est Creatinine Clear Calc


Drug Dose 


  51.3 ml/min 


  


  


 


 


Estimated GFR (


American) 


  75.0 


  


  


 


 


Estimated GFR (Non-


American 


  64.7 


  


  


 


 


BUN/Creatinine Ratio  19.2   


 


Random Glucose  179 mg/dl   


 


Calcium Level  8.3 mg/dl   


 


Magnesium Level  1.9 mg/dl   


 


Total Bilirubin  0.5 mg/dl   


 


Direct Bilirubin  0.2 mg/dl   


 


Aspartate Amino Transf


(AST/SGOT) 


  29 U/L 


  


  


 


 


Alanine Aminotransferase


(ALT/SGPT) 


  59 U/L 


  


  


 


 


Alkaline Phosphatase  118 U/L   


 


Troponin I  0.017 ng/ml   


 


Pro-B-Type Natriuretic Peptide  3414 pg/ml   


 


Total Protein  7.0 gm/dl   


 


Albumin  2.7 gm/dl   


 


Lipase  187 U/L   


 


Osmolality   274 mOsm/kg  


 


Procalcitonin   < 0.05 ng/ml  


 


Urine Color    YELLOW 


 


Urine Appearance    CLEAR 


 


Urine pH    6.0 


 


Urine Specific Gravity    1.041 


 


Urine Protein    TRACE 


 


Urine Glucose (UA)    TRACE 


 


Urine Ketones    NEG 


 


Urine Occult Blood    NEG 


 


Urine Nitrite    NEG 


 


Urine Bilirubin    NEG 


 


Urine Urobilinogen    NEG 


 


Urine Leukocyte Esterase    NEG 


 


Urine WBC (Auto)    1-5 /hpf 


 


Urine RBC (Auto)    0-4 /hpf 


 


Urine Hyaline Casts (Auto)    0 /lpf 


 


Urine Epithelial Cells (Auto)    >30 /lpf 


 


Urine Bacteria (Auto)    NEG 


 


Urine Osmolality    356 mOms/kg 











Assessment & Plan


77 y/o F who came to the ED for sudden onset SOB that resolved with O2 use.





SOB: likely related to recent PNA in the setting of interstitial lung disease


   Sleeps with her mouth open and feels that if she wears a face mask for sleep

, this would not happen again


   CXR, CT chest with interstitial lung disease making read more difficulty


   Minimally elevated WBC in the setting of chronic prednisone use, afebrile, 

O2 sats WNL on baseline O2


   Flu neg


Lengthy discussed with the pt and son about use of face mask and home O2 

options.  They prefer d/c home with trial of face mask for sleep.





DM, HTN, migraines, RA all stable and no changes made





Discussed with Dr. Cisneros who is in agreement with this plan and will d/c pt to 

home.





Advised to return to the ED if there are further concerns.

## 2018-03-18 ENCOUNTER — HOSPITAL ENCOUNTER (INPATIENT)
Dept: HOSPITAL 45 - C.EDB | Age: 78
LOS: 12 days | Discharge: NURSING, REHAB OR LONG-TERM CARE | DRG: 280 | End: 2018-03-30
Attending: FAMILY MEDICINE | Admitting: STUDENT IN AN ORGANIZED HEALTH CARE EDUCATION/TRAINING PROGRAM
Payer: COMMERCIAL

## 2018-03-18 VITALS
WEIGHT: 156.97 LBS | HEIGHT: 67 IN | BODY MASS INDEX: 24.64 KG/M2 | BODY MASS INDEX: 24.64 KG/M2 | WEIGHT: 156.97 LBS | BODY MASS INDEX: 24.64 KG/M2 | HEIGHT: 67 IN

## 2018-03-18 VITALS — OXYGEN SATURATION: 93 % | HEART RATE: 119 BPM

## 2018-03-18 VITALS — OXYGEN SATURATION: 95 % | HEART RATE: 119 BPM

## 2018-03-18 DIAGNOSIS — J96.01: ICD-10-CM

## 2018-03-18 DIAGNOSIS — G25.81: ICD-10-CM

## 2018-03-18 DIAGNOSIS — N17.9: ICD-10-CM

## 2018-03-18 DIAGNOSIS — Z88.2: ICD-10-CM

## 2018-03-18 DIAGNOSIS — Z88.6: ICD-10-CM

## 2018-03-18 DIAGNOSIS — Z87.01: ICD-10-CM

## 2018-03-18 DIAGNOSIS — D64.9: ICD-10-CM

## 2018-03-18 DIAGNOSIS — Z83.3: ICD-10-CM

## 2018-03-18 DIAGNOSIS — F32.9: ICD-10-CM

## 2018-03-18 DIAGNOSIS — I10: ICD-10-CM

## 2018-03-18 DIAGNOSIS — Z88.1: ICD-10-CM

## 2018-03-18 DIAGNOSIS — I07.1: ICD-10-CM

## 2018-03-18 DIAGNOSIS — E11.65: ICD-10-CM

## 2018-03-18 DIAGNOSIS — M06.9: ICD-10-CM

## 2018-03-18 DIAGNOSIS — T38.0X5A: ICD-10-CM

## 2018-03-18 DIAGNOSIS — D72.829: ICD-10-CM

## 2018-03-18 DIAGNOSIS — J18.9: ICD-10-CM

## 2018-03-18 DIAGNOSIS — E87.70: ICD-10-CM

## 2018-03-18 DIAGNOSIS — Z79.899: ICD-10-CM

## 2018-03-18 DIAGNOSIS — J96.02: ICD-10-CM

## 2018-03-18 DIAGNOSIS — J84.10: ICD-10-CM

## 2018-03-18 DIAGNOSIS — Z87.891: ICD-10-CM

## 2018-03-18 DIAGNOSIS — Z99.81: ICD-10-CM

## 2018-03-18 DIAGNOSIS — R41.0: ICD-10-CM

## 2018-03-18 DIAGNOSIS — R60.0: ICD-10-CM

## 2018-03-18 DIAGNOSIS — I21.4: Primary | ICD-10-CM

## 2018-03-18 DIAGNOSIS — I48.0: ICD-10-CM

## 2018-03-18 DIAGNOSIS — Z79.52: ICD-10-CM

## 2018-03-18 DIAGNOSIS — E22.2: ICD-10-CM

## 2018-03-18 DIAGNOSIS — Z66: ICD-10-CM

## 2018-03-18 DIAGNOSIS — Z88.0: ICD-10-CM

## 2018-03-18 DIAGNOSIS — R42: ICD-10-CM

## 2018-03-18 LAB
ALBUMIN SERPL-MCNC: 2.9 GM/DL (ref 3.4–5)
ALP SERPL-CCNC: 208 U/L (ref 45–117)
ALT SERPL-CCNC: 176 U/L (ref 12–78)
AST SERPL-CCNC: 130 U/L (ref 15–37)
BASOPHILS # BLD: 0.01 K/UL (ref 0–0.2)
BASOPHILS NFR BLD: 0.1 %
BUN SERPL-MCNC: 22 MG/DL (ref 7–18)
CALCIUM SERPL-MCNC: 8.8 MG/DL (ref 8.5–10.1)
CO2 SERPL-SCNC: 35 MMOL/L (ref 21–32)
CREAT SERPL-MCNC: 0.92 MG/DL (ref 0.6–1.2)
EOS ABS #: 0 K/UL (ref 0–0.5)
EOSINOPHIL NFR BLD AUTO: 160 K/UL (ref 130–400)
FLUAV RNA SPEC QL NAA+PROBE: (no result)
FLUBV RNA SPEC QL NAA+PROBE: (no result)
GLUCOSE SERPL-MCNC: 189 MG/DL (ref 70–99)
HCT VFR BLD CALC: 35.6 % (ref 37–47)
HGB BLD-MCNC: 11.9 G/DL (ref 12–16)
IG#: 0.04 K/UL (ref 0–0.02)
IMM GRANULOCYTES NFR BLD AUTO: 4.7 %
INR PPP: 1.1 (ref 0.9–1.1)
LYMPHOCYTES # BLD: 0.65 K/UL (ref 1.2–3.4)
MCH RBC QN AUTO: 32.2 PG (ref 25–34)
MCHC RBC AUTO-ENTMCNC: 33.4 G/DL (ref 32–36)
MCV RBC AUTO: 96.2 FL (ref 80–100)
MONO ABS #: 0.85 K/UL (ref 0.11–0.59)
MONOCYTES NFR BLD: 6.2 %
NEUT ABS #: 12.24 K/UL (ref 1.4–6.5)
NEUTROPHILS # BLD AUTO: 0 %
NEUTROPHILS NFR BLD AUTO: 88.7 %
PMV BLD AUTO: 9.8 FL (ref 7.4–10.4)
POTASSIUM SERPL-SCNC: 4.4 MMOL/L (ref 3.5–5.1)
PROT SERPL-MCNC: 7.4 GM/DL (ref 6.4–8.2)
PTT PATIENT: 24 SECONDS (ref 21–31)
RED CELL DISTRIBUTION WIDTH CV: 14.8 % (ref 11.5–14.5)
RED CELL DISTRIBUTION WIDTH SD: 52 FL (ref 36.4–46.3)
SODIUM SERPL-SCNC: 124 MMOL/L (ref 136–145)
WBC # BLD AUTO: 13.79 K/UL (ref 4.8–10.8)

## 2018-03-18 NOTE — DIAGNOSTIC IMAGING REPORT
CHEST ONE VIEW PORTABLE



HISTORY:  78 years-old Female EVALUATE RESPIRATORY DISTRESS.DYSPNEA acute

respiratory distress



COMPARISON: Chest radiograph 3/16/2018, CTA chest 3/16/2018



TECHNIQUE: Portable AP view of the chest



FINDINGS: 

Cardiac silhouette is mildly enlarged, unchanged. Small bilateral pleural

effusions are redemonstrated without pneumothorax. Bilateral mixed interstitial

and alveolar opacities are redemonstrated with alveolar opacities most

pronounced within the mid and lower lung zones. Opacities within the right

midlung appear slightly more confluence from comparison. The remainder of the

study is stable. The patient is rotated to the left.



Sigmoidal scoliosis of the spine. Remote healed fracture of the proximal left

humerus. Bones appear grossly intact.



IMPRESSION: 

1. Cardiomegaly with persistent mixed interstitial and alveolar opacities. Right

midlung opacities appear slightly more confluent from comparison.

2. Small bilateral pleural effusions.

3. Chronic interstitial lung disease. 







The above report was generated using voice recognition software. It may contain

grammatical, syntax or spelling errors.







Electronically signed by:  Domenico Sloan M.D.

3/18/2018 10:15 PM



Dictated Date/Time:  3/18/2018 10:12 PM

## 2018-03-18 NOTE — EMERGENCY ROOM VISIT NOTE
History


Report prepared by Irvin:  Andrea Aj


Under the Supervision of:  Dr. Grupo Barboza M.D.


First contact with patient:  21:44


Chief Complaint:  RESPIRATORY DISTRESS


Stated Complaint:  SOB/WEAKNESS/NAUSEA


Nursing Triage Summary:  


Pt was in hospital Friday for respiratory complaints, today having weakness, 


tired, nauseated, dizziness, shortness of breath, lethargy, accessory muscle 


use. EMS reports SpO2 on 5 lpm was 72%. Pt received duoneb x1, albuterol x1, 

and 


zofran 4 mg en route. SpO2 up to 96 on neb. Pt reports breathing feels better 


since treatment.





History of Present Illness


The patient is a 78 year old female who presents to the Emergency Room with 

complaints of worsening shortness of breath that began a couple of days ago. 

She rates her discomfort as a 5/10 in severity. Per the patient's report, the 

patient was released from the hospital for pneumonia on February 28. The 

patient states that she was in the ER two days ago for her shortness of breath. 

She reports that she had a duoneb treatment and had a chest CT done. Per the 

patient's report, the reading showed no PE, interstitial lung disease, some 

pleural effusions, and some general congestion of the lungs that was difficult 

to narrow down to a cause. The patient states that she typically wears 2L of 

oxygen at home, but reports she increased it to 3 L since coming home two days 

ago. The patient states that since being sent home her shortness of breath has 

worsened. She reports that she has been weak, fatigued, dizzy, and nauseous. 

The patient states that she has been afraid she will fall over due to her 

shortness of breath and weakness. She reports that she called EMS today due to 

her symptoms. Per the patient's report, the patient received a duoneb, 

albuterol neb and Zofran on the way to the ED. The patient denies a fever and 

chest pain.





   Source of History:  patient, other (patient's report)


   Onset:  a couple of days ago


   Position:  other (global)


   Symptom Intensity:  5/10


   Timing:  worsening


   Modifying Factors (Relieving):  oxygen, other (duoneb treatment)


   Associated Symptoms:  + cough, + nausea, + fatigue, + weakness, No fevers, 

No chest pain


   Note:


Associated symptoms: dizzy





Review of Systems


See HPI for pertinent positives & negatives. A total of 10 systems reviewed and 

were otherwise negative.





Past Medical & Surgical


Medical Problems:


(1) Benign tumor of breast


(2) Diabetes


(3) FALL, MULTIPLE FX


(4) Heart disease


(5) Hypertension


(6) Migraines


(7) Pneumonia


(8) Rheumatoid arthritis


(9) Troponin I above reference range








Family History





Cancer


Diabetes mellitus


Gallbladder disease


Heart disease


Lung disease





Social History


Smoking Status:  Former Smoker


Alcohol Use:  none


Drug Use:  none


Marital Status:  


Housing Status:  lives alone


Occupation Status:  retired





Current/Historical Medications


Scheduled


Atenolol (Tenormin), 25 MG PO QPM


Calcium/Vitamin D (Os-Manny 500 Plus D), 1 TAB PO BID


Diltiazem Hcl Ext Rel (Tiazac), 240 MG PO QPM


Estropipate (Estropipate), 0.75 MG PO QPM


Losartan Potassium (Cozaar), 25 MG PO QPM


Medroxyprogesterone (Provera), 2.5 MG PO QPM


Ocuvite Preservision (Ocuvite Preservision), 1 TAB PO BID


Pantoprazole (Pantoprazole Sodium), 40 MG PO QDL


Pravastatin Sodium (Pravastatin Sodium), 80 MG PO QPM


Prednisone (Prednisone), 10 MG PO QAM


Zinc Oxide (Topical) (Triple Paste), 1 APPLN TD BID





Scheduled PRN


Acetamin/Butalbital/Caffeine (Fioricet), 1 TAB PO UD PRN for Migraine


Clonazepam (Klonopin), 0.5 MG PO HS PRN for Sleep





Allergies


Coded Allergies:  


     Amoxicillin (Verified  Allergy, Unknown, UNKNOWN, 3/18/18)


     Clavulanic Acid (Verified  Allergy, Unknown, UNKNOWN, 3/18/18)


     Clindamycin (Verified  Allergy, Unknown, UNKNOWN, 3/18/18)


     Penicillins (Verified  Allergy, Unknown, UNKNOWN, 3/18/18)


     Sulfa Antibiotics (Verified  Adverse Reaction, Intermediate, NAUSEA, 3/18/

18)


     Aspirin (Verified  Adverse Reaction, Mild, GI SYMPTOMS, 3/18/18)


Uncoded Allergies:  


     ARTIFICIAL SWEETNER (Allergy, Mild, TONGUE SORE, 5/9/13)





Physical Exam


Vital Signs











  Date Time  Temp Pulse Resp B/P (MAP) Pulse Ox O2 Delivery O2 Flow Rate FiO2


 


3/19/18 00:00  118 16 149/100 95   


 


3/18/18 23:30  106 21 144/115    


 


3/18/18 23:10  119   93   30


 


3/18/18 23:01    154/105    


 


3/18/18 23:00  122 19  97   


 


3/18/18 22:34  109 22  97   


 


3/18/18 22:31    157/118    


 


3/18/18 22:19  122 21  95 BiPAP  60


 


3/18/18 22:04  102 20  95   


 


3/18/18 22:01    155/84    


 


3/18/18 22:00  119   95   60


 


3/18/18 21:49  129 21  94 Oxymask 15.0 


 


3/18/18 21:38    144/112    


 


3/18/18 21:37  117      


 


3/18/18 21:36     92 Oxymask 15.0 


 


3/18/18 21:34  134 34  88 Nasal Cannula 5.0 


 


3/18/18 21:31 36.8 121 23 171/107 89 Nasal Cannula 5.0 


 


3/18/18 21:29    171/107    











Physical Exam


GENERAL: Patient is in no acute distress.


HEENT: No acute trauma, normocephalic atraumatic, mucous membranes moist, no 

nasal congestion, no scleral icterus.


NECK: No stridor, no adenopathy, no meningismus, trachea is midline.


LUNGS: Crackles bilaterally, equal breath sounds, mild respiratory distress 

noted, some shortness of breath with speaking.


HEART: Tachycardic with an irregular rhythm. No murmurs.


ABDOMEN: Soft, nontender, bowel sounds positive, no hernias, no peritonitis.


EXTREMITIES: No cyanosis, mild bilateral pedal edema, full range of motion of 

all the joints without pain or difficulty, no signs for acute trauma.


NEUROLOGIC: Oriented x 3, no acute motor or sensory deficits, no focal weakness.


SKIN: No rash, no jaundice, no diaphoresis.





Medical Decision & Procedures


ER Provider


Diagnostic Interpretation:


X-ray results as stated below per interpretation by me and the radiologist: 





CHEST ONE VIEW PORTABLE





HISTORY:  78 years-old Female EVALUATE RESPIRATORY DISTRESS.DYSPNEA acute


respiratory distress





COMPARISON: Chest radiograph 3/16/2018, CTA chest 3/16/2018





TECHNIQUE: Portable AP view of the chest





FINDINGS: 


Cardiac silhouette is mildly enlarged, unchanged. Small bilateral pleural


effusions are redemonstrated without pneumothorax. Bilateral mixed interstitial


and alveolar opacities are redemonstrated with alveolar opacities most


pronounced within the mid and lower lung zones. Opacities within the right


midlung appear slightly more confluence from comparison. The remainder of the


study is stable. The patient is rotated to the left.





Sigmoidal scoliosis of the spine. Remote healed fracture of the proximal left


humerus. Bones appear grossly intact.





IMPRESSION: 


1. Cardiomegaly with persistent mixed interstitial and alveolar opacities. Right


midlung opacities appear slightly more confluent from comparison.


2. Small bilateral pleural effusions.


3. Chronic interstitial lung disease. 











The above report was generated using voice recognition software. It may contain


grammatical, syntax or spelling errors.











Electronically signed by:  Domenico Sloan M.D.


3/18/2018 10:15 PM





Dictated Date/Time:  3/18/2018 10:12 PM





Laboratory Results


3/18/18 22:10








Red Blood Count 3.70, Mean Corpuscular Volume 96.2, Mean Corpuscular Hemoglobin 

32.2, Mean Corpuscular Hemoglobin Concent 33.4, Mean Platelet Volume 9.8, 

Neutrophils (%) (Auto) 88.7, Lymphocytes (%) (Auto) 4.7, Monocytes (%) (Auto) 

6.2, Eosinophils (%) (Auto) 0.0, Basophils (%) (Auto) 0.1, Neutrophils # (Auto) 

12.24, Lymphocytes # (Auto) 0.65, Monocytes # (Auto) 0.85, Eosinophils # (Auto) 

0.00, Basophils # (Auto) 0.01





3/18/18 22:10

















Test


  3/18/18


22:10 3/18/18


22:26 3/19/18


00:24


 


White Blood Count


  13.79 K/uL


(4.8-10.8) 


  


 


 


Red Blood Count


  3.70 M/uL


(4.2-5.4) 


  


 


 


Hemoglobin


  11.9 g/dL


(12.0-16.0) 


  


 


 


Hematocrit 35.6 % (37-47)   


 


Mean Corpuscular Volume


  96.2 fL


() 


  


 


 


Mean Corpuscular Hemoglobin


  32.2 pg


(25-34) 


  


 


 


Mean Corpuscular Hemoglobin


Concent 33.4 g/dl


(32-36) 


  


 


 


Platelet Count


  160 K/uL


(130-400) 


  


 


 


Mean Platelet Volume


  9.8 fL


(7.4-10.4) 


  


 


 


Neutrophils (%) (Auto) 88.7 %   


 


Lymphocytes (%) (Auto) 4.7 %   


 


Monocytes (%) (Auto) 6.2 %   


 


Eosinophils (%) (Auto) 0.0 %   


 


Basophils (%) (Auto) 0.1 %   


 


Neutrophils # (Auto)


  12.24 K/uL


(1.4-6.5) 


  


 


 


Lymphocytes # (Auto)


  0.65 K/uL


(1.2-3.4) 


  


 


 


Monocytes # (Auto)


  0.85 K/uL


(0.11-0.59) 


  


 


 


Eosinophils # (Auto)


  0.00 K/uL


(0-0.5) 


  


 


 


Basophils # (Auto)


  0.01 K/uL


(0-0.2) 


  


 


 


RDW Standard Deviation


  52.0 fL


(36.4-46.3) 


  


 


 


RDW Coefficient of Variation


  14.8 %


(11.5-14.5) 


  


 


 


Immature Granulocyte % (Auto) 0.3 %   


 


Immature Granulocyte # (Auto)


  0.04 K/uL


(0.00-0.02) 


  


 


 


Prothrombin Time


  11.6 SECONDS


(9.0-12.0) 


  


 


 


Prothromb Time International


Ratio 1.1 (0.9-1.1) 


  


  


 


 


Activated Partial


Thromboplast Time 24.0 SECONDS


(21.0-31.0) 


  


 


 


Partial Thromboplastin Ratio 0.9   


 


Anion Gap


  5.0 mmol/L


(3-11) 


  


 


 


Est Creatinine Clear Calc


Drug Dose 51.4 ml/min 


  


  


 


 


Estimated GFR (


American) 68.2 


  


  


 


 


Estimated GFR (Non-


American 58.8 


  


  


 


 


BUN/Creatinine Ratio 23.8 (10-20)   


 


Calcium Level


  8.8 mg/dl


(8.5-10.1) 


  


 


 


Magnesium Level


  1.9 mg/dl


(1.8-2.4) 


  


 


 


Total Bilirubin


  0.6 mg/dl


(0.2-1) 


  


 


 


Aspartate Amino Transf


(AST/SGOT) 130 U/L


(15-37) 


  


 


 


Alanine Aminotransferase


(ALT/SGPT) 176 U/L


(12-78) 


  


 


 


Alkaline Phosphatase


  208 U/L


() 


  


 


 


Troponin I


  1.250 ng/ml


(0-0.045) 


  


 


 


Pro-B-Type Natriuretic Peptide


  9164 pg/ml


(0-1800) 


  


 


 


Total Protein


  7.4 gm/dl


(6.4-8.2) 


  


 


 


Albumin


  2.9 gm/dl


(3.4-5.0) 


  


 


 


Globulin


  4.5 gm/dl


(2.5-4.0) 


  


 


 


Albumin/Globulin Ratio 0.7 (0.9-2)   


 


Influenza Type A (RT-PCR)


  


  Neg for Influ


A (NEG) 


 


 


Influenza Type B (RT-PCR)


  


  Neg for Influ


B (NEG) 


 





Laboratory results reviewed by me.





Medications Administered











 Medications


  (Trade)  Dose


 Ordered  Sig/Walter


 Route  Start Time


 Stop Time Status Last Admin


Dose Admin


 


 Ceftriaxone Sodium


  (Rocephin Inj)  1 gm  NOW  STAT


 IV  3/18/18 21:44


 3/18/18 21:47 DC 3/18/18 22:23


1 GM


 


 Methylprednisolone


 Sodium Succinate


  (Solu-Medrol IV)  80 mg  STK-MED ONCE


 .ROUTE  3/18/18 21:59


 3/18/18 22:00 DC 3/18/18 22:00


80 MG


 


 Furosemide


  (Lasix Inj)  40 mg  NOW  STAT


 IV  3/18/18 23:08


 3/18/18 23:09 DC 3/18/18 23:58


40 MG











ECG Per My Interpretation


Indication:  SOB/dyspnea


Rate (beats per minute):  113


Rhythm:  atrial fibrillation


Findings:  other (No ST elevations or PVCs)





ED Course


2144: Ordered Rocephin Injection 1 gm IV.





2147: The patient was evaluated in room B11B. A complete history and physical 

exam was performed.





2159: Ordered Solu-Medrol 80 mg IV.





2308: Ordered Lasix Injection 40 mg IV.





2312: I discussed the patients case with Dr. Briggs, [facility] Hospitalist. He 

understands the patients case and agrees to accept the patient. The patient 

will be further evaluated.





2320: Reevaluated the patient. Discussed results and treatment plan, which she 

agrees to. The patient will be further evaluated.





Medical Decision


The patient is a 78 year old female who presents to the Emergency Room with 

complaints of worsening shortness of breath that began a couple of days ago.  

Differential diagnoses considered include flair of underlying lung disease, 

bronchitis or pneumonia, anemia, electrolyte imbalance, CHF, MI, and failed 

outpatient treatment..





There is a mild leukocytosis at 13,000, this could be consistent with 

infection.  No concerning anemia.  Renal panel testing does show a mildly low 

sodium, no renal failure.  LFTs are somewhat elevated.  BNP is quite high at 

9000 consistent with fluid overload.  Lactic acid level is elevated consistent 

possibly with sepsis or infection.  Chest film shows what appears to be CHF and 

also a possible pneumonia.  The film looks worse than the last film performed.  

Blood cultures are pending.  Influenza testing is negative.  There is no 

coagulopathy.  EKG shows A. fib with a rate of 113, no obvious acute ischemia.  

Cardiac troponin is elevated, this is concerning for cardiac injury or strain.  

ABG demonstrates some mild CO2 retention, no acidosis.





The patient was aggressively managed.  She was placed on BiPAP.  This did seem 

to help improve her breathing.  She received IV Lasix for the fluid overload.  

She received IV ceftriaxone and IV Solu-Medrol.





The patient is breathing easier, she is resting and awakes to voice.  

Hospitalization is required.  I discussed things with the patient and her 

family.  I spoke with case management.  The on-call hospitalist was consulted 

and the case was discussed.





Medication Reconcilliation


Current Medication List:  was personally reviewed by me





Blood Pressure Screening


Patient's blood pressure:  Elevated blood pressure


Referred to Hospitalist.





Consults


Time Called:  2312


Consulting Physician:  Dr. Briggs [facility] Hospitalist


Returned Call:  2312


I discussed the patients case with Dr. Briggs [facility] Hospitalist. He understands 

the patients case and agrees to accept the patient. The patient will be further 

evaluated.





Impression





 Primary Impression:  


 Hypoxia


 Additional Impressions:  


 Pneumonia


 Weakness


 CHF (congestive heart failure)


 Elevated troponin





Critical Care


I have personally spent greater than 30 minutes of critical care time in the 

direct management of this patient.  This includes bedside care, interpretation 

of diagnostic studies and testing, discussion with consultants, the patient, 

and family members, and other required patient management activities.  This 30 

minutes is in excess of all separately billable procedures.





Scribe Attestation


The scribe's documentation has been prepared under my direction and personally 

reviewed by me in its entirety. I confirm that the note above accurately 

reflects all work, treatment, procedures, and medical decision making performed 

by me.





Departure Information


Dispostion


Being Evaluated By Hospitalist





Referrals


Ezra Hinson M.D. (PCP)





Patient Instructions


Asthma - [facility], COPD - [facility], Cr[place] - [facility], [facility]





Problem Qualifiers
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
## 2018-03-19 VITALS
DIASTOLIC BLOOD PRESSURE: 83 MMHG | SYSTOLIC BLOOD PRESSURE: 125 MMHG | OXYGEN SATURATION: 95 % | TEMPERATURE: 97.52 F | HEART RATE: 56 BPM

## 2018-03-19 VITALS
SYSTOLIC BLOOD PRESSURE: 136 MMHG | DIASTOLIC BLOOD PRESSURE: 93 MMHG | HEART RATE: 75 BPM | TEMPERATURE: 97.7 F | OXYGEN SATURATION: 93 %

## 2018-03-19 VITALS
OXYGEN SATURATION: 93 % | DIASTOLIC BLOOD PRESSURE: 95 MMHG | HEART RATE: 90 BPM | TEMPERATURE: 98.24 F | SYSTOLIC BLOOD PRESSURE: 145 MMHG

## 2018-03-19 VITALS — OXYGEN SATURATION: 96 % | HEART RATE: 128 BPM

## 2018-03-19 VITALS — OXYGEN SATURATION: 92 % | HEART RATE: 117 BPM

## 2018-03-19 VITALS
HEART RATE: 87 BPM | SYSTOLIC BLOOD PRESSURE: 119 MMHG | DIASTOLIC BLOOD PRESSURE: 52 MMHG | OXYGEN SATURATION: 93 % | TEMPERATURE: 97.7 F

## 2018-03-19 VITALS — HEART RATE: 80 BPM | OXYGEN SATURATION: 96 %

## 2018-03-19 VITALS
TEMPERATURE: 99.86 F | DIASTOLIC BLOOD PRESSURE: 98 MMHG | OXYGEN SATURATION: 96 % | SYSTOLIC BLOOD PRESSURE: 144 MMHG | HEART RATE: 83 BPM

## 2018-03-19 VITALS — HEART RATE: 91 BPM | OXYGEN SATURATION: 97 %

## 2018-03-19 VITALS — OXYGEN SATURATION: 95 %

## 2018-03-19 VITALS
OXYGEN SATURATION: 97 % | DIASTOLIC BLOOD PRESSURE: 92 MMHG | TEMPERATURE: 97.52 F | SYSTOLIC BLOOD PRESSURE: 143 MMHG | HEART RATE: 66 BPM

## 2018-03-19 VITALS — HEART RATE: 82 BPM | OXYGEN SATURATION: 96 %

## 2018-03-19 VITALS
DIASTOLIC BLOOD PRESSURE: 96 MMHG | OXYGEN SATURATION: 92 % | SYSTOLIC BLOOD PRESSURE: 170 MMHG | HEART RATE: 117 BPM | TEMPERATURE: 97.7 F

## 2018-03-19 LAB
ALBUMIN SERPL-MCNC: 2.5 GM/DL (ref 3.4–5)
ALP SERPL-CCNC: 182 U/L (ref 45–117)
ALT SERPL-CCNC: 145 U/L (ref 12–78)
AST SERPL-CCNC: 92 U/L (ref 15–37)
BASOPHILS # BLD: 0 K/UL (ref 0–0.2)
BASOPHILS NFR BLD: 0 %
BUN SERPL-MCNC: 19 MG/DL (ref 7–18)
CALCIUM SERPL-MCNC: 8.5 MG/DL (ref 8.5–10.1)
CO2 SERPL-SCNC: 34 MMOL/L (ref 21–32)
CREAT SERPL-MCNC: 0.8 MG/DL (ref 0.6–1.2)
EOS ABS #: 0 K/UL (ref 0–0.5)
EOSINOPHIL NFR BLD AUTO: 146 K/UL (ref 130–400)
GLUCOSE SERPL-MCNC: 213 MG/DL (ref 70–99)
HCT VFR BLD CALC: 35.9 % (ref 37–47)
HGB BLD-MCNC: 12.1 G/DL (ref 12–16)
IG#: 0.03 K/UL (ref 0–0.02)
IMM GRANULOCYTES NFR BLD AUTO: 3.9 %
LYMPHOCYTES # BLD: 0.44 K/UL (ref 1.2–3.4)
MCH RBC QN AUTO: 32.2 PG (ref 25–34)
MCHC RBC AUTO-ENTMCNC: 33.7 G/DL (ref 32–36)
MCV RBC AUTO: 95.5 FL (ref 80–100)
MONO ABS #: 0.33 K/UL (ref 0.11–0.59)
MONOCYTES NFR BLD: 3 %
NEUT ABS #: 10.38 K/UL (ref 1.4–6.5)
NEUTROPHILS # BLD AUTO: 0 %
NEUTROPHILS NFR BLD AUTO: 92.8 %
PMV BLD AUTO: 9.9 FL (ref 7.4–10.4)
POTASSIUM SERPL-SCNC: 4 MMOL/L (ref 3.5–5.1)
PROT SERPL-MCNC: 7 GM/DL (ref 6.4–8.2)
PTT PATIENT: 59.2 SECONDS (ref 21–31)
RED CELL DISTRIBUTION WIDTH CV: 14.6 % (ref 11.5–14.5)
RED CELL DISTRIBUTION WIDTH SD: 50.3 FL (ref 36.4–46.3)
SODIUM SERPL-SCNC: 127 MMOL/L (ref 136–145)
WBC # BLD AUTO: 11.18 K/UL (ref 4.8–10.8)

## 2018-03-19 RX ADMIN — METHYLPREDNISOLONE SODIUM SUCCINATE SCH MLS/MIN: 1 INJECTION, POWDER, FOR SOLUTION INTRAMUSCULAR; INTRAVENOUS at 21:10

## 2018-03-19 RX ADMIN — LEVOFLOXACIN SCH MLS/HR: 5 INJECTION, SOLUTION INTRAVENOUS at 01:45

## 2018-03-19 RX ADMIN — Medication SCH TAB: at 08:26

## 2018-03-19 RX ADMIN — DILTIAZEM HYDROCHLORIDE SCH MG: 30 TABLET, FILM COATED ORAL at 21:14

## 2018-03-19 RX ADMIN — CEFEPIME SCH MLS/MIN: 2 INJECTION, POWDER, FOR SOLUTION INTRAVENOUS at 04:07

## 2018-03-19 RX ADMIN — PANTOPRAZOLE SCH MG: 40 TABLET, DELAYED RELEASE ORAL at 12:28

## 2018-03-19 RX ADMIN — Medication SCH TAB: at 21:15

## 2018-03-19 RX ADMIN — DILTIAZEM HYDROCHLORIDE SCH MG: 30 TABLET, FILM COATED ORAL at 12:27

## 2018-03-19 RX ADMIN — DILTIAZEM HYDROCHLORIDE SCH MG: 30 TABLET, FILM COATED ORAL at 16:38

## 2018-03-19 RX ADMIN — VANCOMYCIN HYDROCHLORIDE SCH MLS/HR: 1 INJECTION, POWDER, LYOPHILIZED, FOR SOLUTION INTRAVENOUS at 16:38

## 2018-03-19 RX ADMIN — FUROSEMIDE SCH MLS/MIN: 10 INJECTION, SOLUTION INTRAMUSCULAR; INTRAVENOUS at 08:26

## 2018-03-19 RX ADMIN — HEPARIN SODIUM SCH MLS/HR: 5000 INJECTION, SOLUTION INTRAVENOUS at 19:46

## 2018-03-19 RX ADMIN — DICLOFENAC SODIUM SCH APPLN: 10 GEL TOPICAL at 19:39

## 2018-03-19 RX ADMIN — METHYLPREDNISOLONE SODIUM SUCCINATE SCH MLS/MIN: 1 INJECTION, POWDER, FOR SOLUTION INTRAMUSCULAR; INTRAVENOUS at 13:05

## 2018-03-19 RX ADMIN — HEPARIN SODIUM SCH MLS/HR: 5000 INJECTION, SOLUTION INTRAVENOUS at 01:15

## 2018-03-19 RX ADMIN — METHYLPREDNISOLONE SODIUM SUCCINATE SCH MLS/MIN: 1 INJECTION, POWDER, FOR SOLUTION INTRAMUSCULAR; INTRAVENOUS at 06:05

## 2018-03-19 RX ADMIN — CEFEPIME SCH MLS/MIN: 2 INJECTION, POWDER, FOR SOLUTION INTRAVENOUS at 16:37

## 2018-03-19 RX ADMIN — PRAVASTATIN SODIUM SCH MG: 40 TABLET ORAL at 21:15

## 2018-03-19 NOTE — ECHOCARDIOGRAM REPORT
*NOTICE TO RECEIVING PARTY AGENCY**  This information is strictly Confidential and protected under 
[place] law.  [place] law prohibits you from making any further disclosure of this 
information unless further disclosure is expressly permitted by the written consent of the person to 
whom it pertains or is authorized by law.  A general authorization for the release of medical or 
other information is not sufficient for this purpose.  Hospital accepts no responsibility if the 
information is made available to any other person, INCLUDING THE PATIENT.



Interpretation Summary

  *  Name: LOLA APIPAH  Study Date: 2018 11:45 AM  BP: 144/98 mmHg

  *  MRN: [id number]  Patient Location: C.2E\S\E202\S\1  HR: 86

  *  : 1940 (M/d/yyyy)  Gender: Female  Height: 67 in

  *  Age: 78 yrs  Ethnicity: [place]  Weight: 149 lb

  *  Ordering Physician: Vijay Montiel

  *  Referring Physician: Self, Referred

  *  Performed By: Janessa Solis RCS

  *  Accession# [id number]  Account# [id number]

  *  Reason For Study: MI

  *  BSA: 1.8 m2

  *  -- Conclusions --

  *  1. Normal left ventricular size and systolic function.  EF 60-65%.  No regional wall motion 
abnormalities.  Mild concentric left ventricular hypertrophy.

  *  2. The right ventricle is mildly dilated.

  *  3. The left atrium is severely dilated.

  *  4. The right atrium is moderately dilated.

  *  5. Aortic valve sclerosis mild, without significant aortic valvular stenosis.

  *  6. There is mild mitral regurgitation.

  *  7. There is severe tricuspid regurgitation.

  *  8. Top-normal right ventricular systolic pressure; 37mmHg.

  *  9. Technically difficult study, enhanced with IV Definity.

  *  10. Compared to prior study on 2012, atrial fibrillation has replaced sinus rhythm.  
Tricuspid regurgitation now appears severe.

Procedure Details

  *  A complete two-dimensional transthoracic echocardiogram was performed (2D, M-mode, Doppler and 
color flow Doppler).

  *  A contrast injection of Definity was performed to improve assessment of LV function.

  *  Contrast was injected into an intravenous site in the left arm.

  *  One vial of Definity ultrasound contrast was diluted in normal saline to a total volume of 10 
ml.  A total of '2' ml of solution was administered during imaging.

  *  Lot # [id number] of Definity utilized for procedure.

  *  Expiration date 1 .

  *  The attending nurse who injected the contrast agent was MARYSE GREENWOOD, PCU, RN.

Left Ventricle

  *  Normal left ventricular size and systolic function.  EF 60-65%.  No regional wall motion 
abnormalities.  Mild concentric left ventricular hypertrophy.  Tissue Doppler does not suggest 
elevated left atrial pressures.

Right Ventricle

  *  The right ventricle is mildly dilated.

  *  The right ventricle is not well visualized.

Atria

  *  The left atrium is severely dilated.

  *  The right atrium is moderately dilated.

  *  Interatrial septum not well visualized.

Mitral Valve

  *  The mitral valve is grossly normal.

  *  There is mild mitral annular calcification.

  *  There is no mitral valve stenosis.

  *  There is mild mitral regurgitation.

Tricuspid Valve

  *  There is no tricuspid stenosis.

  *  There is severe tricuspid regurgitation.

Aortic Valve

  *  The aortic valve is trileaflet.

  *  Aortic valve sclerosis mild, without significant aortic valvular stenosis.

  *  There is no significant aortic regurgitation.

Pulmonic Valve

  *  The pulmonary valve is inadequately visualized, but the Doppler data is adequate for 
interpretation.

  *  There is no pulmonic valvular stenosis.

  *  Trace pulmonic valvular regurgitation.

Great Vessels

  *  The aortic root is normal size.

  *  Ascending aorta of normal dimension

Pericardium/Pleural

  *  There is no pericardial effusion.

  *  Pleural effusion.

Great Vessels

  *  Top normal IVC size.  Reduced inspiratory collapse.



MMode 2D Measurements and Calculations

IVSd 1.3 cm

IVSs 1.7 cm



LVIDd 3.5 cm

LVIDs 2.3 cm

LVPWd 1.2 cm

LVPWs 1.2 cm



IVS/LVPW 1.2 

FS 32.4 %

EDV(Teich) 49.7 ml

ESV(Teich) 19.0 ml

EF(Teich) 61.8 %



EDV(cubed) 41.7 ml

ESV(cubed) 12.9 ml

EF(cubed) 69.1 %

% IVS thick 30.8 %

% LVPW thick 4.7 %





LV mass(C)d 141.3 grams

LV mass(C)dI 79.2 grams/m\S\2

LV mass(C)s 113.2 grams

LV mass(C)sI 63.4 grams/m\S\2



SV(Teich) 30.7 ml

SI(Teich) 17.2 ml/m\S\2

SV(cubed) 28.8 ml

SI(cubed) 16.2 ml/m\S\2



Ao root diam 2.8 cm

Ao root area 6.3 cm\S\2

ACS 1.6 cm

LA dimension 3.3 cm



asc Aorta Diam 2.7 cm





LA/Ao 1.2 

LVOT diam 2.0 cm

LVOT area 3.2 cm\S\2













Doppler Measurements and Calculations

MV E max chuy 73.3 cm/sec



MV P1/2t max chuy 86.5 cm/sec

MV P1/2t 81.6 msec

MVA(P1/2t) 2.7 cm\S\2

MV dec slope 310.3 cm/sec\S\2

MV dec time 0.25 sec



Ao V2 max 61.5 cm/sec

Ao max PG 1.5 mmHg

Ao max PG (full) 0.46 mmHg

TANYA(V,A) 2.6 cm\S\2

TANYA(V,D) 2.6 cm\S\2



LV V1 max PG 1.0 mmHg





LV V1 max 51.2 cm/sec



MR max chuy 581.3 cm/sec

MR max .2 mmHg



PA V2 max 59.5 cm/sec

PA max PG 1.4 mmHg



TR max chuy 268.1 cm/sec

RVSP(TR) 36.8 mmHg





RAP systole 8.0 mmHg

## 2018-03-19 NOTE — FAMILY MEDICINE PROGRESS NOTE
Progress Note


Date of Service


Mar 19, 2018.





Subjective


Pt evaluation today including:  conversation w/ patient, physical exam, chart 

review, lab review


Voiding:  washburn catheter in place


78-year-old female with a past medical history of rheumatoid arthritis with 

interstitial lung disease, hypertension, diabetes, depression, paroxysmal 

atrial fibrillation presented to the ER with complaints of progressively 

worsening shortness of breath over the last few days.  Was found to be hypoxic 

in the ER and was placed on a BiPAP which seemed to have improved her mental 

status.  She was also found to be in A. fib with RVR and started on diltiazem 

drip with heparin drip for anticoagulation.





The patient appears to be alert and oriented today and states that she feels 

better and her breathing is improved on the BiPAP.  She denies any chest pain, 

palpitations, dizziness or lightheadedness.





   Constitutional:  No fever, No chills


   Eyes:  No worsening of vision


   ENT:  + hearing loss


   Respiratory:  + wheezing, + shortness of breath, + dyspnea on exertion, No 

cough, No sputum


   Cardiovascular:  No chest pain


   Breast:  No breast lump


   Abdomen:  No pain, No nausea


   Musculoskeletal:  No joint pain





Medications





Current Inpatient Medications








 Medications


  (Trade)  Dose


 Ordered  Sig/Walter


 Route  Start Time


 Stop Time Status Last Admin


Dose Admin


 


 Al Hydrox/Mg


 Hydrox/Simethicone


  (Maalox Max Susp)  15 ml  Q4H  PRN


 PO  3/19/18 00:15


 4/18/18 00:14   


 


 


 Magnesium


 Hydroxide


  (Milk Of


 Magnesia Susp)  30 ml  Q12H  PRN


 PO  3/19/18 00:15


 4/18/18 00:14   


 


 


 Ondansetron HCl


  (Zofran Inj)  4 mg  Q6H  PRN


 IV  3/19/18 00:15


 4/18/18 00:14   


 


 


 Nitroglycerin


  (Nitrostat Tab)  0.4 mg  UD  PRN


 SL  3/19/18 00:15


 4/18/18 00:14   


 


 


 Polyethylene


  (Miralax Powder


 Packet)  17 gm  DAILY  PRN


 PO  3/19/18 00:15


 4/18/18 00:14   


 


 


 Methylprednisolone


 Sodium Succinate


 40 mg/Syringe  0.64 ml @ 


 1.5 mls/min  Q8H


 IV  3/19/18 06:00


 4/18/18 00:14  3/19/18 13:05


1.5 MLS/MIN


 


 Acetaminophen/


 Butalbital/


 Caffeine


  (Fioricet Tab)  1 tab  DAILY  PRN


 PO  3/19/18 00:15


 4/18/18 00:14   


 


 


 Calcium/Vitamin D


  (Caltrate Plus


 Tab)  1 tab  BID


 PO  3/19/18 09:00


 4/18/18 08:59  3/19/18 08:26


1 TAB


 


 Clonazepam


  (Klonopin Tab)  0.5 mg  HS  PRN


 PO  3/19/18 00:15


 4/18/18 00:14   


 


 


 Multivitamins/


 Minerals


  (Multivitamin W/


 Minerals Tab)  1 tab  BID


 PO  3/19/18 09:00


 4/18/18 08:59  3/19/18 08:26


1 TAB


 


 Pantoprazole


 Sodium


  (Protonix Tab)  40 mg  QDL


 PO  3/19/18 11:00


 4/18/18 10:59  3/19/18 12:28


40 MG


 


 Pravastatin Sodium


  (Pravachol Tab)  80 mg  QPM


 PO  3/19/18 21:00


 4/18/18 20:59   


 


 


 Furosemide 40 mg/


 Syringe  4 ml @ 4


 mls/min  DAILY


 IV  3/19/18 09:00


 4/18/18 08:59  3/19/18 08:26


4 MLS/MIN


 


 Levofloxacin 750


 mg/Prmx  150 ml @ 


 100 mls/hr  Q24H


 IV  3/19/18 01:00


 3/26/18 00:59  3/19/18 01:45


100 MLS/HR


 


 Miscellaneous


 Information


  (Consult)  1 ea  UD  PRN


 N/A  3/19/18 00:45


 4/18/18 00:44   


 


 


 Heparin Sodium/


 Dextrose  500 ml @ 


 24 mls/hr  [id number]


 IV  3/19/18 01:15


 4/18/18 01:14   


 


 


 Miscellaneous


  (Iv Fluids


 Completed)  1 ea  PRN  PRN


 N/A  3/19/18 03:30


 3/19/19 03:29   


 


 


 Cefepime HCl 2000


 mg/Syringe  20 ml @ 5


 mls/min  Q12H


 IV  3/19/18 04:00


 3/26/18 03:59  3/19/18 16:37


5 MLS/MIN


 


 Vancomycin HCl


 1250 mg/Sodium


 Chloride  275 ml @ 


 125 mls/hr  Q16H


 IV  3/19/18 17:00


 3/26/18 16:59  3/19/18 16:38


125 MLS/HR


 


 Diltiazem HCl 125


 mg/Dextrose  125 ml @ 0


 mls/hr  Q0M PRN


 IV  3/19/18 04:15


 4/18/18 04:14  3/19/18 04:42


10 MLS/HR


 


 Atenolol


  (Tenormin Tab)  25 mg  BID


 PO  3/19/18 21:00


 4/18/18 20:59   


 


 


 Diltiazem HCl


  (Cardizem Tab)  90 mg  Q6H


 PO  3/19/18 10:00


 4/18/18 09:59  3/19/18 12:27


90 MG


 


 Diclofenac Sodium


  (Voltaren 1% Top


 Gel)  1 appln  [id number]


 EXT  3/19/18 16:00


 4/18/18 15:59   


 











Objective


Vital Signs











  Date Time  Temp Pulse Resp B/P (MAP) Pulse Ox O2 Delivery O2 Flow Rate FiO2


 


3/19/18 15:04 36.4 56 18 125/83 (97) 95 BiPAP  


 


3/19/18 12:00      BiPAP 15.0 45


 


3/19/18 11:57 36.8 90 27 145/95 (112) 93 Nasal Cannula 4.0 


 


3/19/18 08:18 37.7 83 22 144/98 (113) 96 BiPAP  


 


3/19/18 08:00      BiPAP 15.0 45


 


3/19/18 07:17  80   96   45


 


3/19/18 04:42  91   97   45


 


3/19/18 03:08  117   92   45


 


3/19/18 03:00 36.5 117 20 170/96 92 BiPAP  45


 


3/19/18 02:43  128   96   30


 


3/19/18 01:35  109 15  97 BiPAP  


 


3/19/18 01:31    166/85    


 


3/19/18 01:05  108 14  96   


 


3/19/18 01:02  114      


 


3/19/18 01:01    164/90    


 


3/19/18 00:35  134 31  94   


 


3/19/18 00:31    159/112    


 


3/19/18 00:05  131 25  92   


 


3/19/18 00:00  118 16 149/100 95   


 


3/18/18 23:30  106 21 144/115    


 


3/18/18 23:10  119   93   30


 


3/18/18 23:01    154/105    


 


3/18/18 23:00  122 19  97   


 


3/18/18 22:34  109 22  97   


 


3/18/18 22:31    157/118    


 


3/18/18 22:19  122 21  95 BiPAP  60


 


3/18/18 22:04  102 20  95   


 


3/18/18 22:01    155/84    


 


3/18/18 22:00  119   95   60


 


3/18/18 21:49  129 21  94 Oxymask 15.0 


 


3/18/18 21:38    144/112    


 


3/18/18 21:37  117      


 


3/18/18 21:36     92 Oxymask 15.0 


 


3/18/18 21:34  134 34  88 Nasal Cannula 5.0 


 


3/18/18 21:31 36.8 121 23 171/107 89 Nasal Cannula 5.0 


 


3/18/18 21:29    171/107    











Physical Exam


General Appearance:  WD/WN, no apparent distress


Eyes:  normal inspection


ENT:  normal ENT inspection, hearing grossly normal


Neck:  supple


Respiratory/Chest:  + crackles, + rales, + rhonchi, + pertinent finding (On 

BiPAP)


Cardiovascular:  + irregularly irregular


Abdomen:  normal bowel sounds, non tender


Extremities:  normal range of motion, non-tender, + pedal edema


Neurologic/Psychiatric:  alert, normal mood/affect, oriented x 3


Skin:  normal color





Laboratory Results


3/19/18 06:01








Red Blood Count 3.76, Mean Corpuscular Volume 95.5, Mean Corpuscular Hemoglobin 

32.2, Mean Corpuscular Hemoglobin Concent 33.7, Mean Platelet Volume 9.9, 

Neutrophils (%) (Auto) 92.8, Lymphocytes (%) (Auto) 3.9, Monocytes (%) (Auto) 

3.0, Eosinophils (%) (Auto) 0.0, Basophils (%) (Auto) 0.0, Neutrophils # (Auto) 

10.38, Lymphocytes # (Auto) 0.44, Monocytes # (Auto) 0.33, Eosinophils # (Auto) 

0.00, Basophils # (Auto) 0.00





3/19/18 06:01

















Test


  3/18/18


22:10 3/18/18


22:26 3/19/18


00:24 3/19/18


06:01


 


Prothrombin Time


  11.6 SECONDS


(9.0-12.0) 


  


  


 


 


Prothromb Time International


Ratio 1.1 (0.9-1.1) 


  


  


  


 


 


Pro-B-Type Natriuretic Peptide


  9164 pg/ml


(0-1800) 


  


  


 


 


Influenza Type A (RT-PCR)


  


  Neg for Influ


A (NEG) 


  


 


 


Influenza Type B (RT-PCR)


  


  Neg for Influ


B (NEG) 


  


 


 


Arterial Blood pH


  


  


  7.38


(7.35-7.45) 


 


 


Arterial Blood Partial


Pressure CO2 


  


  55 mmHg


(35-46) 


 


 


Arterial Blood Partial


Pressure O2 


  


  79 mm/Hg


(80-95) 


 


 


Arterial Blood HCO3


  


  


  32 mmol/L


(19-24) 


 


 


Arterial Blood Oxygen


Saturation 


  


  94.9 % (90-95) 


  


 


 


Arterial Blood Base Excess


  


  


  5.6 mEq/L


(-9-1.8) 


 


 


Arterial Blood Gas Delivery   45%  


 


Lalit Test   POS (POS)  


 


White Blood Count


  


  


  


  11.18 K/uL


(4.8-10.8)


 


Red Blood Count


  


  


  


  3.76 M/uL


(4.2-5.4)


 


Hemoglobin


  


  


  


  12.1 g/dL


(12.0-16.0)


 


Hematocrit    35.9 % (37-47) 


 


Mean Corpuscular Volume


  


  


  


  95.5 fL


()


 


Mean Corpuscular Hemoglobin


  


  


  


  32.2 pg


(25-34)


 


Mean Corpuscular Hemoglobin


Concent 


  


  


  33.7 g/dl


(32-36)


 


Platelet Count


  


  


  


  146 K/uL


(130-400)


 


Mean Platelet Volume


  


  


  


  9.9 fL


(7.4-10.4)


 


Neutrophils (%) (Auto)    92.8 % 


 


Lymphocytes (%) (Auto)    3.9 % 


 


Monocytes (%) (Auto)    3.0 % 


 


Eosinophils (%) (Auto)    0.0 % 


 


Basophils (%) (Auto)    0.0 % 


 


Neutrophils # (Auto)


  


  


  


  10.38 K/uL


(1.4-6.5)


 


Lymphocytes # (Auto)


  


  


  


  0.44 K/uL


(1.2-3.4)


 


Monocytes # (Auto)


  


  


  


  0.33 K/uL


(0.11-0.59)


 


Eosinophils # (Auto)


  


  


  


  0.00 K/uL


(0-0.5)


 


Basophils # (Auto)


  


  


  


  0.00 K/uL


(0-0.2)


 


RDW Standard Deviation


  


  


  


  50.3 fL


(36.4-46.3)


 


RDW Coefficient of Variation


  


  


  


  14.6 %


(11.5-14.5)


 


Immature Granulocyte % (Auto)    0.3 % 


 


Immature Granulocyte # (Auto)


  


  


  


  0.03 K/uL


(0.00-0.02)


 


Activated Partial


Thromboplast Time 


  


  


  59.2 SECONDS


(21.0-31.0)


 


Partial Thromboplastin Ratio    2.3 


 


Anion Gap


  


  


  


  8.0 mmol/L


(3-11)


 


Est Creatinine Clear Calc


Drug Dose 


  


  


  54.6 ml/min 


 


 


Estimated GFR (


American) 


  


  


  80.7 


 


 


Estimated GFR (Non-


American 


  


  


  69.6 


 


 


BUN/Creatinine Ratio    24.1 (10-20) 


 


Lactic Acid Level


  


  


  


  1.8 mmol/L


(0.4-2.0)


 


Calcium Level


  


  


  


  8.5 mg/dl


(8.5-10.1)


 


Magnesium Level


  


  


  


  1.9 mg/dl


(1.8-2.4)


 


Total Bilirubin


  


  


  


  0.4 mg/dl


(0.2-1)


 


Aspartate Amino Transf


(AST/SGOT) 


  


  


  92 U/L (15-37) 


 


 


Alanine Aminotransferase


(ALT/SGPT) 


  


  


  145 U/L


(12-78)


 


Alkaline Phosphatase


  


  


  


  182 U/L


()


 


Total Protein


  


  


  


  7.0 gm/dl


(6.4-8.2)


 


Albumin


  


  


  


  2.5 gm/dl


(3.4-5.0)


 


Globulin


  


  


  


  4.5 gm/dl


(2.5-4.0)


 


Albumin/Globulin Ratio    0.6 (0.9-2) 


 


Procalcitonin


  


  


  


  0.10 ng/ml


(0-0.5)


 


Test


  3/19/18


16:09 3/19/18


17:02 


  


 


 


Troponin I


  0.948 ng/ml


(0-0.045) 


  


  


 


 


Bedside Glucose


  


  214 mg/dl


(70-90) 


  


 














 Date/Time


Source Procedure


Growth Status


 


 


 3/19/18 04:50


Nasal MRSA DNA Surveillance Screen - Final


Specimen Positive for MRSA by DNA Probe Complete





(CHEST) THORAX WITHOUT





CLINICAL HISTORY: 78 years-old Female presenting with pneumonia vs CHF. 





TECHNIQUE: Multidetector CT imaging of the chest was performed without the use


of intravenous contrast. IV contrast: None. A dose lowering technique was used


consistent with the principles of ALARA (as low as reasonably achievable).





COMPARISON: Chest x-ray from 3/18/2018 and chest CT from 3/16/2018..





CT DOSE (mGy.cm): The estimated cumulative dose is 255.36 mGy.cm.





FINDINGS:





 topogram: Cardiomegaly and diffuse opacities in the lungs.





On soft tissue windows, normal thyroid. Foci of gas in the left axillary


vasculature likely relate to injection. Multiple prominent mediastinal lymph


nodes in the prevascular, aortopulmonary, and right paratracheal, and precarinal


regions measuring up to 9 mm in the short axis. Evaluation of the michael limited


in the absence of intravenous contrast. Atherosclerosis of the aorta. Biatrial


enlargement of the heart. Coronary artery and aortic valve calcification. Small


bilateral pleural effusions. No pericardial effusion. Upper abdomen normal.





On lung windows, interval increase in central groundglass and solid


peribronchovascular consolidation involving all 5 lobes. There is background


peripheral reticulation, consolidation, and cystic change that has overall a


upper lobe and anterior predominant distribution. Patchy bronchiectasis also


suggested though evaluation is degraded by respiratory motion artifact. Mild


evidence of peripheral interlobular septal thickening. Central airways patent.





On bone windows, degenerative changes of the spine. Mild anterior wedging


deformity of T6, T8, T11, and T12. Osteopenia. Posttraumatic deformity of the


left humeral neck.





IMPRESSION:


1.  Interval worsening of peribronchovascular consolidation involving all 5


lobes. This is favored to represent infection, diffuse alveolar damage, or


hemorrhage, with pulmonary edema considered less likely. Bronchoscopy to be


considered.





2.  These findings are on a background of chronic lung disease with peripheral


predominant consolidation and fibrotic change with an upper lobe predominance.


This could suggest underlying chronic eosinophilic pneumonia, organizing


pneumonia, or connective tissue disease.





3.  Small bilateral pleural effusions.





4.  Biatrial enlargement of the heart.





5.  Reactive mediastinal lymphadenopathy.





6.  Osteopenia with multiple mild anterior compression deformities of several


thoracic vertebral bodies. Correlate with point tenderness to assess for acuity.











Electronically signed by:  Mal Estrada M.D.


3/19/2018 8:26 AM





Dictated Date/Time:  3/19/2018 6:55 AM





Assessment and Plan


78-year-old female with a past medical history of rheumatoid arthritis with 

interstitial lung disease, hypertension, diabetes, depression, paroxysmal 

atrial fibrillation presented to the ER with complaints of progressively 

worsening shortness of breath over the last few days.  Was found to be hypoxic 

in the ER and was placed on a BiPAP which seemed to have improved her mental 

status.  She was also found to be in A. fib with RVR and started on diltiazem 

drip with heparin drip for anticoagulation.








Acute hypoxic respiratory failure: Pneumonia with worsening interstitial lung 

disease


-Chest CT  revealed interval worsening of peribronchovascular consolidation 

involving all 5 lobes suggestive of infection versus diffuse alveolar damage or 

hemorrhage  on a background of chronic lung disease with peripheral predominant 

consolidation and fibrotic change with an upper lobe predominance.


This could suggest underlying chronic eosinophilic pneumonia, organizing 

pneumonia, or connective tissue disease.


-Continue BiPAP


-MRSA positive, influenza negative


-Continue cefepime, vancomycin and Levaquin.  We will switch to azithromycin 

tomorrow 


-Continue IV methylprednisolone 40 mg every 8 hours








NSTEMI:


-Troponins appear to be downtrending, 1.25-->1.18-->1.14-->0.98


-Likely secondary to demand ischemia


-Patient is allergic to aspirin


-Continue pravastatin


-Echo : . Normal left ventricular size and systolic function.  EF 60-65%.  No 

regional wall motion abnormalities.  Mild concentric left ventricular 

hypertrophy.


  *  2. The right ventricle is mildly dilated.


  *  3. The left atrium is severely dilated.


  *  4. The right atrium is moderately dilated.


  *  5. Aortic valve sclerosis mild, without significant aortic valvular 

stenosis.


  *  6. There is mild mitral regurgitation.


  *  7. There is severe tricuspid regurgitation.


  *  8. Top-normal right ventricular systolic pressure; 37mmHg.


  *  9. Technically difficult study, enhanced with IV Definity.


  *  10. Compared to prior study on 03/13/2012, atrial fibrillation has 

replaced sinus rhythm.  Tricuspid regurgitation now appears severe.





Atrial fibrillation with rapid ventricular rate:


-Has a history of paroxysmal atrial fib 


-Was started on Cardizem drip which was switched to p.o. Cardizem 90 mg every 6 

hours and atenolol 25 mg twice daily


-Currently on heparin drip, will discuss oral anticoagulation





Hypertension :


-Continue atenolol and diltiazem


 


Hypervolemia:


-Echo as above 


-IV Lasix 40 mg daily, monitor I's and O's and daily weights





Transaminitis


-Likely secondary to liver congestion


- Trend LFTs





Steroid-induced hyperglycemia


-Monitor blood sugar





RA 


- Placed on IV steroids, 


- Continue Cimzia as outpt when recovers from PNM








Hyponatremia


-Sodium at 127


-Monitor BMP





DVT prophylaxis: 


Heparin drip





DNR





Disposition :currently on telemetry





Resident Physician Supervision Note:





I interviewed and examined the patient. Discussed with Dr. Fine and agree 

with findings and plan as documented in the note. Any exceptions or 

clarifications are listed here: None





Documented By:  Torres Woody


feeling much better breathing better


vitals noted nad breathing surprisingly unlabored and fairly clear poor air 

entry scattered rales





mixed respiratory failure - hypoxia and hypercapnea - from infection 

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


-improving


-abx, supportive care





afib - rate control ipmroving





otherwise as above





Resident Tracking


Resident Involvement:  Resident Care Provided


Care Provided:  Adult Hospital Medicine

## 2018-03-19 NOTE — CARDIOLOGY CONSULTATION
DATE OF CONSULTATION:  03/19/2018

 

TIME:  10:01 a.m.

 

CONSULTING PHYSICIAN:  Dr. Yue Mcneil

 

REASON FOR CONSULTATION:  Atrial fibrillation, elevated troponin and possible

CHF.

 

HISTORY OF PRESENT ILLNESS:  Ms. Solomon is a pleasant 78-year-old female with

a history significant for interstitial lung disease, hypertension, diabetes,

rheumatoid arthritis and paroxysmal SVT, who presented to [facility] on 03/18/2018 with shortness of breath, weakness, and nausea.

 

She was hospitalized in February with pneumonia and came to the Emergency

Department a few days ago with shortness of breath, but preferred to attempt

management at home and therefore was discharged from the Emergency

Department.  Unfortunately, she became more short of breath and presented to

the Emergency Department once again.

 

She denies angina, syncope, near syncope, palpitations.  She noticed edema

approximately 1 week ago, but believes it improved with elevation of her

legs.  There was report that she had decreased level of alertness upon

presentation, but she feels as though that has improved.  She felt warm, but

there were no documented fevers and there were no shaking chills.  She was

found to be hypoxic in the Emergency Department with the lowest documented

oxygen saturation listed as 88%.  She was placed on BiPAP and is feeling

better from a breathing standpoint.  Initial ECG demonstrated atrial

fibrillation with rapid ventricular response and she also had an elevated

troponin.  She was given IV steroids as well as diuretic therapy and

antibiotic therapy was also initiated upon discharge.  For her atrial

fibrillation, she was placed on diltiazem drip.  At home, she had been taking

diltiazem 240 mg daily as well as atenolol 25 mg at night.

 

She denies melena, hematochezia, hematuria, or other bleeding.  She had

coughing only with breathing treatments, but stated that it was a

nonproductive cough.

 

She brought up her code status while discussing her presenting symptoms.  She

was clear to state that she would not want defibrillated, she would not want

chest compressions.  She states that if her heart stops she would want to go

naturally.  She was giving further consideration regarding her wishes for

mechanical ventilation and plan on discussing this further with the primary

service.

 

REVIEW OF SYSTEMS:  As above and otherwise review of systems were

negative/unremarkable.

 

PAST MEDICAL HISTORY:

1.  Rheumatoid arthritis.

2.  Interstitial lung disease.

3.  Paroxysmal SVT.

4.  Hypertension.

5.  Restless leg syndrome.

6.  Chronic leukocytosis.

7.  Type 2 diabetes.

8.  Depression.

 

HOME MEDICATIONS:  Include diltiazem 240 mg at night, atenolol 25 mg at

night, losartan 25 mg daily, Protonix 40 mg daily, pravastatin 80 mg daily,

prednisone 10 mg daily.

 

INPATIENT MEDICATIONS:  Include diltiazem drip, atenolol 25 mg at night,

pravastatin 80 mg daily, Protonix 40 mg daily, vancomycin 1.25 grams IV q. 16

hours, methylprednisolone 40 mg IV q. 8 hours, levofloxacin 750 mg IV q. 24

hours, Lasix 40 mg IV daily, heparin drip per protocol, cefepime 2 grams IV

q. 12 hours.

 

ALLERGIES:  INCLUDE AMOXICILLIN, ASPIRIN, CLAVULANIC ACID, CLINDAMYCIN,

SULFA.

 

SOCIAL HISTORY:  She quit smoking at the age of 31 after approximately 10

pack years.  No alcohol or drugs.  She is a retired .  She is a

.  She had 4 children, but a son passed away.  She is alone in her

hospital room.  Although the nurse did present to the bedside during her

discussing her code status.

 

FAMILY HISTORY:  No known premature CAD.

 

PHYSICAL EXAMINATION:

VITAL SIGNS:  Temperature 37.7 degrees, heart rate currently 83 beats per

minute, respiration rate 22, blood pressure 144/98 mmHg and she has been

hypertensive throughout.  Oxygen saturation 96% on BiPAP.  I's and O's

negative 1 liter thus far.  Weight 67.7 kg.

GENERAL:  No acute distress.  She appears comfortable on BiPAP from a

breathing standpoint.  She is alert.

HEENT:  Anicteric sclerae.

NECK:  No appreciable JVD.  No bruits.  Normal carotid upstrokes bilaterally.

CARDIAC EXAMINATION:  PMI was nonpalpable.  There was no ventricular heave,

irregularly irregular, normal S1, S2.  There were no audible murmurs, rubs or

gallops.

LUNGS:  Crackles bilateral lung fields throughout.

ABDOMEN:  Soft, nontender, nondistended, normal active bowel sounds, no

bruits noted.

EXTREMITIES:  2+ bilateral pitting edema to the knees.  No cyanosis.  2+

radial pulses bilaterally.  2+ dorsalis pedis pulses bilaterally.  No

cyanosis.

PSYCHIATRIC:  Affect appears appropriate.

 

LABORATORY DATA:  White blood cell count is 11.18, hemoglobin 12.1, platelets

146.  Sodium 127, up from 124, potassium 4, BUN 19, creatinine 0.8.  Lactic

acid initially 3.2, repeated at 1.8.  Troponin initially 1.25 and has trended

down to 1.18.  Albumin 2.5.  Procalcitonin 0.1, AST 92,  down from

176, alkaline phosphatase 182.  INR 1.1.  ABG; pH 7.38, CO2 55, pO2 79. 

Influenza A and B are negative.

 

A CT scan of the chest, 03/19/2018, reported as interval worsening of ketan

bronchovascular consolidation involving all 5 lobes favored to represent

infection, diffuse alveolar damage, or hemorrhage as per radiology. 

Background findings consistent of chronic lung disease with peripheral

consolidation and fibrotic change with an upper lobe predominance.  Small

bilateral pleural effusions.  Biatrial enlargement of the heart.  Reactive

mediastinal lymphadenopathy.

 

ECG personally reviewed on presentation atrial fibrillation with rapid

ventricular response at 113 beats per minute.  Telemetry personally reviewed.

 Atrial fibrillation with improved heart rate control on current regimen.

 

Most recent echo, 08/20/2015, normal LV size, wall motion and systolic

function.  EF 60-65%.  Mild LVH.  Mild MR.  Moderate TR.  RVSP 31 mmHg.

 

ASSESSMENT AND PLAN:

1.  Non-ST elevation myocardial infarction:  She did not present with acute

coronary syndrome and denies any angina.  Her elevated troponins are likely

secondary to demand ischemia from hypoxia, atrial fibrillation with rapid

ventricular response and her acute on chronic pulmonary issues concerning for

pneumonia per radiology in the setting of interstitial lung disease.  This

was discussed with her.  SHE IS ALLERGIC TO ASPIRIN.  She is currently on a

heparin drip.  Because there was some concern of hemorrhage based on

radiology report, we will leave that decision on continue heparin drip to the

primary service.  Would hold off on Plavix as an antiplatelet agent. 

Continue statin therapy if no contraindications.  Her transaminase levels are

trending downward.  There is no indication for urgent cardiac catheterization

and would recommend medical management.  Improve heart rate and blood

pressure.

2.  Atrial fibrillation with rapid ventricular response:  Would like to get

her on oral medications if possible.  Increase atenolol to 25 mg twice daily

with 1 dose now.  She typically takes diltiazem 240 mg at night.  We will

increase the old dose to 360 mg and start 90 mg q. 6 hours of the short

acting medication.  Attempt to wean diltiazem drip as titrating oral

medications.  Would recommend rate control strategy for her atrial

fibrillation.  The onset of her atrial fibrillation is not currently known. 

Agree with anticoagulation if no contraindications.  This was discussed with

primary service, Dr. Fine and will leave the decision to continue heparin

drip to the primary service once they are able to review the CT scan to see

if there is significant concern for alveolar hemorrhage.

3.  Hypertension:  Adjusting atenolol and diltiazem as noted above.

4.  Hypervolemia:  She does appear to be hypervolemic.  Agree with diuretics.

 Especially with her chronic lung issues she will likely do better when in a

euvolemic state.  Echocardiogram ordered.

5.  Pneumonia and interstitial lung disease:  As per primary service. 

Consider pulmonology consultation.

6.  Disposition:  Cardiology will continue to follow.  Code status was

discussed with primary service and Dr. Fine will discuss further with the

patient.  Please call with any other questions or concerns.



Highly complex medical issues.

 

Thank you for allowing me to participate in care of Ms. Solomon.

 

 

 

SHANIQUE

## 2018-03-19 NOTE — PHARMACY PROGRESS NOTE
Pharmacy Abx Initial Consult


Date of Service


Mar 19, 2018.





Pharmacy Dosing Scope


Date of Consult:  3/19/18


Consultation requested by: Dr. Mcneil





Pharmacy is consulted to initiate vancomycin IV dosing therapy, order 

appropriate labs and adjust drug dose/frequency.





Subjective


The patient is a 78 year old female admitted on Mar 19, 2018 at 00:48 with HAP.





Objective


Height (Feet):  5


Height (Inches):  7.00


Weight (Kilograms):  74.500


Vital Signs (Past 12Hrs)





Vital Signs Past 12 Hours








  Date Time  Temp Pulse Resp B/P (MAP) Pulse Ox O2 Delivery O2 Flow Rate FiO2


 


3/19/18 03:08  117   92   30


 


3/19/18 02:43  128   96   30


 


3/19/18 01:35  109 15  97 BiPAP  


 


3/19/18 01:31    166/85    


 


3/19/18 01:05  108 14  96   


 


3/19/18 01:02  114      


 


3/19/18 01:01    164/90    


 


3/19/18 00:35  134 31  94   


 


3/19/18 00:31    159/112    


 


3/19/18 00:05  131 25  92   


 


3/19/18 00:00  118 16 149/100 95   


 


3/18/18 23:30  106 21 144/115    


 


3/18/18 23:10  119   93   30


 


3/18/18 23:01    154/105    


 


3/18/18 23:00  122 19  97   


 


3/18/18 22:34  109 22  97   


 


3/18/18 22:31    157/118    


 


3/18/18 22:19  122 21  95 BiPAP  60


 


3/18/18 22:04  102 20  95   


 


3/18/18 22:01    155/84    


 


3/18/18 22:00  119   95   60


 


3/18/18 21:49  129 21  94 Oxymask 15.0 


 


3/18/18 21:38    144/112    


 


3/18/18 21:37  117      


 


3/18/18 21:36     92 Oxymask 15.0 


 


3/18/18 21:34  134 34  88 Nasal Cannula 5.0 


 


3/18/18 21:31 36.8 121 23 171/107 89 Nasal Cannula 5.0 


 


3/18/18 21:29    171/107    








Lab Results (24Hrs)





Laboratory Tests (24 Hours)








Test


  3/18/18


22:10 3/19/18


00:24


 


White Blood Count


  13.79 K/uL


(4.8-10.8)  H 


 


 


Red Blood Count


  3.70 M/uL


(4.2-5.4)  L 


 


 


Hemoglobin


  11.9 g/dL


(12.0-16.0)  L 


 


 


Hematocrit


  35.6 % (37-47)


L 


 


 


Mean Corpuscular Volume


  96.2 fL


() 


 


 


Mean Corpuscular Hemoglobin


  32.2 pg


(25-34) 


 


 


Mean Corpuscular Hemoglobin


Concent 33.4 g/dl


(32-36) 


 


 


Platelet Count


  160 K/uL


(130-400) 


 


 


Mean Platelet Volume


  9.8 fL


(7.4-10.4) 


 


 


Neutrophils (%) (Auto) 88.7 %   


 


Lymphocytes (%) (Auto) 4.7 %   


 


Monocytes (%) (Auto) 6.2 %   


 


Eosinophils (%) (Auto) 0.0 %   


 


Basophils (%) (Auto) 0.1 %   


 


Neutrophils # (Auto)


  12.24 K/uL


(1.4-6.5)  H 


 


 


Lymphocytes # (Auto)


  0.65 K/uL


(1.2-3.4)  L 


 


 


Monocytes # (Auto)


  0.85 K/uL


(0.11-0.59)  H 


 


 


Eosinophils # (Auto)


  0.00 K/uL


(0-0.5) 


 


 


Basophils # (Auto)


  0.01 K/uL


(0-0.2) 


 


 


Lactic Acid Level


  


  3.2 mmol/L


(0.4-2.0)  *H








Micro Results











 Date/Time


Source Procedure


Growth Status


 


 


 3/18/18 22:12


Blood Blood Culture


Pending Received


 


 3/18/18 22:10


Blood Blood Culture


Pending Received











Risk Factors for Resistance





* Immunocompromised (chronic steroid therapy with RA)


* Antimicrobial use within the last 90 days (Levaquin)





Assessment & Plan


Assessment








78 year old female  with a PMH of interstitial lung disease on home oxygen 2 

Liters, immunosuppression on chronic prednisone for RA, and recent hospital 

admission at the end of February for pneumonia. Patient received Levaquin.











Plan








vancomycin for treatment of pneumonia





Vancomycin IV 


* Loading dose: 2000  mg  (26.8 mg/kg)


* Maintenance dose: 1250 mg IV (16.7 mg/kg) every 16 hours - population 

pharmacokinetics suggest a half-life of 13.86 hours..aggressive dosing for 

pulmonary indication.


* Goal trough level for pulmonary : 15 to 20 mcg/mL


* Trough 3ordered for 3/21/18 prior to 1700 dose


* MRSA nasal swab ordered- if negative recommend discontinuation of vancomycin.











Pharmacy will continue to follow and will adjust dose/frequency as necessary.  

Thank you.

## 2018-03-19 NOTE — DIAGNOSTIC IMAGING REPORT
(CHEST) THORAX WITHOUT



CLINICAL HISTORY: 78 years-old Female presenting with pneumonia vs CHF. 



TECHNIQUE: Multidetector CT imaging of the chest was performed without the use

of intravenous contrast. IV contrast: None. A dose lowering technique was used

consistent with the principles of ALARA (as low as reasonably achievable).



COMPARISON: Chest x-ray from 3/18/2018 and chest CT from 3/16/2018..



CT DOSE (mGy.cm): The estimated cumulative dose is 255.36 mGy.cm.



FINDINGS:



 topogram: Cardiomegaly and diffuse opacities in the lungs.



On soft tissue windows, normal thyroid. Foci of gas in the left axillary

vasculature likely relate to injection. Multiple prominent mediastinal lymph

nodes in the prevascular, aortopulmonary, and right paratracheal, and precarinal

regions measuring up to 9 mm in the short axis. Evaluation of the michael limited

in the absence of intravenous contrast. Atherosclerosis of the aorta. Biatrial

enlargement of the heart. Coronary artery and aortic valve calcification. Small

bilateral pleural effusions. No pericardial effusion. Upper abdomen normal.



On lung windows, interval increase in central groundglass and solid

peribronchovascular consolidation involving all 5 lobes. There is background

peripheral reticulation, consolidation, and cystic change that has overall a

upper lobe and anterior predominant distribution. Patchy bronchiectasis also

suggested though evaluation is degraded by respiratory motion artifact. Mild

evidence of peripheral interlobular septal thickening. Central airways patent.



On bone windows, degenerative changes of the spine. Mild anterior wedging

deformity of T6, T8, T11, and T12. Osteopenia. Posttraumatic deformity of the

left humeral neck.



IMPRESSION:

1.  Interval worsening of peribronchovascular consolidation involving all 5

lobes. This is favored to represent infection, diffuse alveolar damage, or

hemorrhage, with pulmonary edema considered less likely. Bronchoscopy to be

considered.



2.  These findings are on a background of chronic lung disease with peripheral

predominant consolidation and fibrotic change with an upper lobe predominance.

This could suggest underlying chronic eosinophilic pneumonia, organizing

pneumonia, or connective tissue disease.



3.  Small bilateral pleural effusions.



4.  Biatrial enlargement of the heart.



5.  Reactive mediastinal lymphadenopathy.



6.  Osteopenia with multiple mild anterior compression deformities of several

thoracic vertebral bodies. Correlate with point tenderness to assess for acuity.







Electronically signed by:  Mal Estrada M.D.

3/19/2018 8:26 AM



Dictated Date/Time:  3/19/2018 6:55 AM

## 2018-03-20 VITALS — OXYGEN SATURATION: 95 % | SYSTOLIC BLOOD PRESSURE: 133 MMHG | HEART RATE: 46 BPM | DIASTOLIC BLOOD PRESSURE: 86 MMHG

## 2018-03-20 VITALS
OXYGEN SATURATION: 97 % | HEART RATE: 97 BPM | TEMPERATURE: 99.5 F | DIASTOLIC BLOOD PRESSURE: 72 MMHG | SYSTOLIC BLOOD PRESSURE: 128 MMHG

## 2018-03-20 VITALS
SYSTOLIC BLOOD PRESSURE: 144 MMHG | TEMPERATURE: 97.52 F | HEART RATE: 63 BPM | DIASTOLIC BLOOD PRESSURE: 88 MMHG | OXYGEN SATURATION: 97 %

## 2018-03-20 VITALS — HEART RATE: 78 BPM | OXYGEN SATURATION: 96 %

## 2018-03-20 VITALS
OXYGEN SATURATION: 95 % | HEART RATE: 69 BPM | SYSTOLIC BLOOD PRESSURE: 132 MMHG | DIASTOLIC BLOOD PRESSURE: 76 MMHG | TEMPERATURE: 97.88 F

## 2018-03-20 VITALS
TEMPERATURE: 98.06 F | DIASTOLIC BLOOD PRESSURE: 86 MMHG | SYSTOLIC BLOOD PRESSURE: 144 MMHG | HEART RATE: 74 BPM | OXYGEN SATURATION: 98 %

## 2018-03-20 VITALS
SYSTOLIC BLOOD PRESSURE: 152 MMHG | OXYGEN SATURATION: 98 % | TEMPERATURE: 98.24 F | DIASTOLIC BLOOD PRESSURE: 81 MMHG | HEART RATE: 65 BPM

## 2018-03-20 VITALS — HEART RATE: 48 BPM | OXYGEN SATURATION: 96 %

## 2018-03-20 VITALS
TEMPERATURE: 97.7 F | SYSTOLIC BLOOD PRESSURE: 132 MMHG | OXYGEN SATURATION: 96 % | HEART RATE: 84 BPM | DIASTOLIC BLOOD PRESSURE: 87 MMHG

## 2018-03-20 LAB
BASOPHILS # BLD: 0 K/UL (ref 0–0.2)
BASOPHILS NFR BLD: 0 %
BUN SERPL-MCNC: 26 MG/DL (ref 7–18)
CALCIUM SERPL-MCNC: 8.8 MG/DL (ref 8.5–10.1)
CO2 SERPL-SCNC: 37 MMOL/L (ref 21–32)
CREAT SERPL-MCNC: 1.02 MG/DL (ref 0.6–1.2)
CREAT SERPL-MCNC: 1.03 MG/DL (ref 0.6–1.2)
EOS ABS #: 0 K/UL (ref 0–0.5)
EOSINOPHIL NFR BLD AUTO: 172 K/UL (ref 130–400)
GLUCOSE SERPL-MCNC: 177 MG/DL (ref 70–99)
HCT VFR BLD CALC: 34.3 % (ref 37–47)
HGB BLD-MCNC: 11.5 G/DL (ref 12–16)
IG#: 0.03 K/UL (ref 0–0.02)
IMM GRANULOCYTES NFR BLD AUTO: 4.9 %
LYMPHOCYTES # BLD: 0.58 K/UL (ref 1.2–3.4)
MCH RBC QN AUTO: 32.2 PG (ref 25–34)
MCHC RBC AUTO-ENTMCNC: 33.5 G/DL (ref 32–36)
MCV RBC AUTO: 96.1 FL (ref 80–100)
MONO ABS #: 0.73 K/UL (ref 0.11–0.59)
MONOCYTES NFR BLD: 6.2 %
NEUT ABS #: 10.4 K/UL (ref 1.4–6.5)
NEUTROPHILS # BLD AUTO: 0 %
NEUTROPHILS NFR BLD AUTO: 88.6 %
PMV BLD AUTO: 10 FL (ref 7.4–10.4)
POTASSIUM SERPL-SCNC: 4.1 MMOL/L (ref 3.5–5.1)
PTT PATIENT: 195.6 SECONDS (ref 21–31)
PTT PATIENT: 72 SECONDS (ref 21–31)
RED CELL DISTRIBUTION WIDTH CV: 14.5 % (ref 11.5–14.5)
RED CELL DISTRIBUTION WIDTH SD: 51.6 FL (ref 36.4–46.3)
SODIUM SERPL-SCNC: 123 MMOL/L (ref 136–145)
WBC # BLD AUTO: 11.74 K/UL (ref 4.8–10.8)

## 2018-03-20 RX ADMIN — DICLOFENAC SODIUM SCH APPLN: 10 GEL TOPICAL at 13:07

## 2018-03-20 RX ADMIN — INSULIN ASPART SCH UNITS: 100 INJECTION, SOLUTION INTRAVENOUS; SUBCUTANEOUS at 16:49

## 2018-03-20 RX ADMIN — Medication SCH TAB: at 09:17

## 2018-03-20 RX ADMIN — PRAVASTATIN SODIUM SCH MG: 40 TABLET ORAL at 19:23

## 2018-03-20 RX ADMIN — CEFEPIME SCH MLS/MIN: 2 INJECTION, POWDER, FOR SOLUTION INTRAVENOUS at 16:27

## 2018-03-20 RX ADMIN — DILTIAZEM HYDROCHLORIDE SCH MG: 60 TABLET, FILM COATED ORAL at 03:52

## 2018-03-20 RX ADMIN — HEPARIN SODIUM SCH MLS/HR: 5000 INJECTION, SOLUTION INTRAVENOUS at 13:13

## 2018-03-20 RX ADMIN — LEVOFLOXACIN SCH MLS/HR: 5 INJECTION, SOLUTION INTRAVENOUS at 01:38

## 2018-03-20 RX ADMIN — METHYLPREDNISOLONE SODIUM SUCCINATE SCH MLS/MIN: 1 INJECTION, POWDER, FOR SOLUTION INTRAMUSCULAR; INTRAVENOUS at 04:39

## 2018-03-20 RX ADMIN — INSULIN ASPART SCH UNITS: 100 INJECTION, SOLUTION INTRAVENOUS; SUBCUTANEOUS at 09:45

## 2018-03-20 RX ADMIN — CEFEPIME SCH MLS/MIN: 2 INJECTION, POWDER, FOR SOLUTION INTRAVENOUS at 04:37

## 2018-03-20 RX ADMIN — ACETAMINOPHEN SCH MG: 325 TABLET ORAL at 06:46

## 2018-03-20 RX ADMIN — DICLOFENAC SODIUM SCH APPLN: 10 GEL TOPICAL at 09:16

## 2018-03-20 RX ADMIN — VANCOMYCIN HYDROCHLORIDE SCH MLS/HR: 1 INJECTION, POWDER, LYOPHILIZED, FOR SOLUTION INTRAVENOUS at 09:42

## 2018-03-20 RX ADMIN — Medication SCH TAB: at 19:23

## 2018-03-20 RX ADMIN — FUROSEMIDE SCH MLS/MIN: 10 INJECTION, SOLUTION INTRAMUSCULAR; INTRAVENOUS at 09:17

## 2018-03-20 RX ADMIN — DILTIAZEM HYDROCHLORIDE SCH MG: 60 TABLET, FILM COATED ORAL at 09:17

## 2018-03-20 RX ADMIN — PANTOPRAZOLE SCH MG: 40 TABLET, DELAYED RELEASE ORAL at 09:17

## 2018-03-20 RX ADMIN — DILTIAZEM HYDROCHLORIDE SCH MG: 60 TABLET, FILM COATED ORAL at 16:28

## 2018-03-20 RX ADMIN — INSULIN ASPART SCH UNITS: 100 INJECTION, SOLUTION INTRAVENOUS; SUBCUTANEOUS at 22:01

## 2018-03-20 RX ADMIN — METHYLPREDNISOLONE SODIUM SUCCINATE SCH MLS/MIN: 1 INJECTION, POWDER, FOR SOLUTION INTRAMUSCULAR; INTRAVENOUS at 14:42

## 2018-03-20 RX ADMIN — DILTIAZEM HYDROCHLORIDE SCH MG: 60 TABLET, FILM COATED ORAL at 04:39

## 2018-03-20 RX ADMIN — INSULIN ASPART SCH UNITS: 100 INJECTION, SOLUTION INTRAVENOUS; SUBCUTANEOUS at 13:12

## 2018-03-20 RX ADMIN — METHYLPREDNISOLONE SODIUM SUCCINATE SCH MLS/MIN: 1 INJECTION, POWDER, FOR SOLUTION INTRAMUSCULAR; INTRAVENOUS at 22:02

## 2018-03-20 RX ADMIN — ACETAMINOPHEN SCH MG: 325 TABLET ORAL at 13:13

## 2018-03-20 RX ADMIN — DILTIAZEM HYDROCHLORIDE SCH MG: 60 TABLET, FILM COATED ORAL at 22:02

## 2018-03-20 RX ADMIN — DICLOFENAC SODIUM SCH APPLN: 10 GEL TOPICAL at 16:28

## 2018-03-20 RX ADMIN — ACETAMINOPHEN SCH MG: 325 TABLET ORAL at 18:00

## 2018-03-20 NOTE — CARDIOLOGY PROGRESS NOTE
DATE: 03/20/2018

 

TIME:  10:20 a.m.

 

SUBJECTIVE:  Her breathing has improved, but she is still short of breath. 

She is no longer on BiPAP, but rather supplemental oxygen via nasal cannula. 

She denies chest pain, syncope.  She does have lightheadedness when she sits

up quickly.  She believes her edema has improved.  She denies any bleeding.

 

Nursing staff has held this morning doses of atenolol and diltiazem secondary

to bradycardia.

 

OBJECTIVE:

VITAL SIGNS:  Temperature 37.5 degrees, heart rate 97 beats per minute last

charted but currently in the 60s, respiration rate 20, blood pressure 128/72

mmHg. oxygen saturation 97% when she was on BiPAP earlier.  I's and O's

negative 1.6 liters yesterday.  Weight is 69.2 kg.

GENERAL:  In no acute distress.  She is alert.

CARDIAC EXAM:  No ventricular heave, irregularly irregular, but rate

controlled.  No audible murmurs, rubs or gallops.

LUNGS:  Crackles bilaterally throughout sparing the apical areas.

ABDOMEN:  Soft, nontender, nondistended.  Normoactive bowel sounds.

EXTREMITIES:  Edema has improved and she currently has 1-2+ bilateral lower

extremity edema to the knees.  No cyanosis.

PSYCHIATRIC:  Affect appears appropriate.

 

MEDICATIONS:   Diltiazem 60 mg p.o. q. 6 hours; however, one dose has been

held, atenolol 25 mg twice daily with the morning dose being held for

bradycardia, cefepime 2 grams IV q. 12 hours, heparin drip per protocol,

Lasix 40 mg IV daily, levofloxacin 750 mg IV q. 24 hours, methylprednisolone

40 mg IV q. 8 hours, Protonix 40 mg daily, pravastatin 80 mg daily,

vancomycin 1.25 grams IV q. 16 hours.

 

DATA:  Sodium 123, potassium 4.1, BUN 26, creatinine 1.02, .  White

blood cell count 11.74, hemoglobin 11.5, platelets 172.

 

Telemetry personally reviewed.  Atrial fibrillation with a much improved

heart rate control was some intermittent bradycardia.

 

Echocardiogram 03/19/2018:  Normal LV size and systolic function.  EF 60-65%.

 Normal wall motion.  Mild LVH.  Mildly dilated RV.  Severely dilated left

atrium.  Moderate right atrial dilation.  Sclerotic aortic valve.  Mild MR. 

Severe TR.  RVSP 37 mmHg.

 

ASSESSMENT AND PLAN:

1.  Atrial fibrillation with rapid ventricular response:  Continue current

regimen for now.  Her heart rate may have been more tachycardic as she

presented with respiratory distress.  We may just resume her home medications

if she continues to have intermittent bradycardia.  Continue anticoagulation

for stroke risk reduction if no contraindications.

2.  Non-ST elevation myocardial infarction:  She did not present with acute

coronary syndrome and had not had any angina.  This is likely secondary to

her atrial fibrillation with rapid ventricular response and hypoxia with her

multifocal pneumonia.  Medical management recommended.  SHE IS ALLERGIC TO

ASPIRIN.  She is currently on heparin drip.  Continue beta blocker.  Continue

statin therapy.

3.  Tricuspid regurgitation:  Severe on most recent echo.  She is

hypervolemic and this may improve with continued diuresis.  She also has

significant lung issues with interstitial lung disease and pneumonia as noted

above.  Conservative management recommended.

4.  Hypertension:  Blood pressure overall improved.  Continue current

regimen.

5.  Hypervolemia:  Continue to diurese.  She is improving.  This may be

evidence of right heart failure with the lower extremity edema.  Given the

fact that she has severe TR continue current diuretic.

6.  Pneumonia interstitial lung disease:  As per primary service.

7.  Disposition:  Cardiology will continue to follow.

## 2018-03-20 NOTE — FAMILY MEDICINE PROGRESS NOTE
Progress Note


Date of Service


Mar 20, 2018.





Subjective


Pt evaluation today including:  conversation w/ patient, physical exam, chart 

review, lab review





   Constitutional:  No fever, No chills


   Eyes:  No worsening of vision


   ENT:  No hearing loss


   Respiratory:  No cough, No sputum


   Cardiovascular:  No chest pain


   Abdomen:  No pain, No nausea





Medications





Current Inpatient Medications








 Medications


  (Trade)  Dose


 Ordered  Sig/Walter


 Route  Start Time


 Stop Time Status Last Admin


Dose Admin


 


 Al Hydrox/Mg


 Hydrox/Simethicone


  (Maalox Max Susp)  15 ml  Q4H  PRN


 PO  3/19/18 00:15


 4/18/18 00:14   


 


 


 Magnesium


 Hydroxide


  (Milk Of


 Magnesia Susp)  30 ml  Q12H  PRN


 PO  3/19/18 00:15


 4/18/18 00:14   


 


 


 Ondansetron HCl


  (Zofran Inj)  4 mg  Q6H  PRN


 IV  3/19/18 00:15


 4/18/18 00:14   


 


 


 Nitroglycerin


  (Nitrostat Tab)  0.4 mg  UD  PRN


 SL  3/19/18 00:15


 4/18/18 00:14   


 


 


 Polyethylene


  (Miralax Powder


 Packet)  17 gm  DAILY  PRN


 PO  3/19/18 00:15


 4/18/18 00:14   


 


 


 Methylprednisolone


 Sodium Succinate


 40 mg/Syringe  0.64 ml @ 


 1.5 mls/min  Q8H


 IV  3/19/18 06:00


 4/18/18 00:14  3/20/18 04:39


1.5 MLS/MIN


 


 Acetaminophen/


 Butalbital/


 Caffeine


  (Fioricet Tab)  1 tab  DAILY  PRN


 PO  3/19/18 00:15


 4/18/18 00:14   


 


 


 Calcium/Vitamin D


  (Caltrate Plus


 Tab)  1 tab  BID


 PO  3/19/18 09:00


 4/18/18 08:59  3/20/18 09:17


1 TAB


 


 Clonazepam


  (Klonopin Tab)  0.5 mg  HS  PRN


 PO  3/19/18 00:15


 4/18/18 00:14   


 


 


 Multivitamins/


 Minerals


  (Multivitamin W/


 Minerals Tab)  1 tab  BID


 PO  3/19/18 09:00


 4/18/18 08:59  3/20/18 09:17


1 TAB


 


 Pantoprazole


 Sodium


  (Protonix Tab)  40 mg  QDL


 PO  3/19/18 11:00


 4/18/18 10:59  3/20/18 09:17


40 MG


 


 Pravastatin Sodium


  (Pravachol Tab)  80 mg  QPM


 PO  3/19/18 21:00


 4/18/18 20:59  3/19/18 21:15


80 MG


 


 Furosemide 40 mg/


 Syringe  4 ml @ 4


 mls/min  DAILY


 IV  3/19/18 09:00


 4/18/18 08:59  3/20/18 09:17


4 MLS/MIN


 


 Levofloxacin 750


 mg/Prmx  150 ml @ 


 100 mls/hr  Q24H


 IV  3/19/18 01:00


 3/26/18 00:59  3/20/18 01:38


100 MLS/HR


 


 Miscellaneous


 Information


  (Consult)  1 ea  UD  PRN


 N/A  3/19/18 00:45


 4/18/18 00:44   


 


 


 Heparin Sodium/


 Dextrose  500 ml @ 


 24 mls/hr  [id number]


 IV  3/19/18 01:15


 4/18/18 01:14  3/19/18 19:46


24 MLS/HR


 


 Miscellaneous


  (Iv Fluids


 Completed)  1 ea  PRN  PRN


 N/A  3/19/18 03:30


 3/19/19 03:29   


 


 


 Cefepime HCl 2000


 mg/Syringe  20 ml @ 5


 mls/min  Q12H


 IV  3/19/18 04:00


 3/26/18 03:59  3/20/18 04:37


5 MLS/MIN


 


 Vancomycin HCl


 1250 mg/Sodium


 Chloride  275 ml @ 


 125 mls/hr  Q16H


 IV  3/19/18 17:00


 3/26/18 16:59  3/20/18 09:42


125 MLS/HR


 


 Diltiazem HCl 125


 mg/Dextrose  125 ml @ 0


 mls/hr  Q0M PRN


 IV  3/19/18 04:15


 4/18/18 04:14  3/19/18 04:42


10 MLS/HR


 


 Atenolol


  (Tenormin Tab)  25 mg  BID


 PO  3/19/18 21:00


 4/18/18 20:59  3/19/18 21:14


25 MG


 


 Diclofenac Sodium


  (Voltaren 1% Top


 Gel)  1 appln  3XDQ4


 EXT  3/19/18 16:00


 4/18/18 15:59  3/20/18 09:16


1 APPLN


 


 Glucagon


  (Glucagon Inj)  1 mg  UD  PRN


 SQ  3/19/18 19:15


 4/18/18 19:14   


 


 


 Glucose


  (Glucose 40% Gel)  15-30


 GRAMS 15


 GRAMS...  UD  PRN


 PO  3/19/18 19:15


 4/18/18 19:14   


 


 


 Glucose


  (Glucose Chew


 Tab)  4-8


 Tablets 4


 Tabl...  UD  PRN


 PO  3/19/18 19:15


 4/18/18 19:14   


 


 


 Insulin Aspart


  (novoLOG ASPART)  **SLIDING


 SCALE**


 **If C...  ACHS


 SC  3/20/18 07:00


 4/19/18 06:59   


 


 


 Diltiazem HCl


  (Cardizem Tab)  60 mg  Q6H


 PO  3/20/18 04:00


 4/19/18 03:59  3/20/18 04:39


60 MG


 


 Acetaminophen


  (Tylenol Tab)  650 mg  Q6H


 PO  3/20/18 06:00


 4/19/18 05:59  3/20/18 06:46


650 MG











Objective


Vital Signs











  Date Time  Temp Pulse Resp B/P (MAP) Pulse Ox O2 Delivery O2 Flow Rate FiO2


 


3/20/18 07:52 37.5 97 20 128/72 (90) 97 BiPAP  


 


3/20/18 07:24  48   96   45


 


3/20/18 04:27 36.4 63 14 144/88 (106) 97 BiPAP  45


 


3/20/18 04:00      BiPAP  45


 


3/20/18 01:56  78   96   45


 


3/20/18 00:47  46 18 133/86 (102) 95 BiPAP  45


 


3/20/18 00:01      BiPAP  45


 


3/19/18 23:23 36.4 66 20 143/92 (109) 97 BiPAP  


 


3/19/18 22:41  82   96   45


 


3/19/18 20:00      Nasal Cannula 4.0 


 


3/19/18 19:47 36.5 75 20 136/93 (107) 93 Nasal Cannula 4.0 


 


3/19/18 16:00     95 BiPAP  


 


3/19/18 15:04 36.4 56 18 125/83 (97) 95 BiPAP  


 


3/19/18 12:00      BiPAP 15.0 45


 


3/19/18 11:57 36.8 90 27 145/95 (112) 93 Nasal Cannula 4.0 











Physical Exam


General Appearance:  WD/WN, no apparent distress


Eyes:  normal inspection


ENT:  hearing grossly normal


Neck:  supple


Respiratory/Chest:  chest non-tender, + crackles, + rales, + rhonchi


Cardiovascular:  + bradycardia, + irregularly irregular


Abdomen:  non tender, soft


Neurologic/Psychiatric:  alert, normal mood/affect, oriented x 3





Laboratory Results


3/20/18 06:08








Red Blood Count 3.57, Mean Corpuscular Volume 96.1, Mean Corpuscular Hemoglobin 

32.2, Mean Corpuscular Hemoglobin Concent 33.5, Mean Platelet Volume 10.0, 

Neutrophils (%) (Auto) 88.6, Lymphocytes (%) (Auto) 4.9, Monocytes (%) (Auto) 

6.2, Eosinophils (%) (Auto) 0.0, Basophils (%) (Auto) 0.0, Neutrophils # (Auto) 

10.40, Lymphocytes # (Auto) 0.58, Monocytes # (Auto) 0.73, Eosinophils # (Auto) 

0.00, Basophils # (Auto) 0.00





3/20/18 06:08








3/20/18 06:10

















Test


  3/19/18


16:09 3/20/18


06:08 3/20/18


06:10 3/20/18


06:11


 


Troponin I


  0.948 ng/ml


(0-0.045) 


  


  


 


 


White Blood Count


  


  11.74 K/uL


(4.8-10.8) 


  


 


 


Red Blood Count


  


  3.57 M/uL


(4.2-5.4) 


  


 


 


Hemoglobin


  


  11.5 g/dL


(12.0-16.0) 


  


 


 


Hematocrit  34.3 % (37-47)   


 


Mean Corpuscular Volume


  


  96.1 fL


() 


  


 


 


Mean Corpuscular Hemoglobin


  


  32.2 pg


(25-34) 


  


 


 


Mean Corpuscular Hemoglobin


Concent 


  33.5 g/dl


(32-36) 


  


 


 


Platelet Count


  


  172 K/uL


(130-400) 


  


 


 


Mean Platelet Volume


  


  10.0 fL


(7.4-10.4) 


  


 


 


Neutrophils (%) (Auto)  88.6 %   


 


Lymphocytes (%) (Auto)  4.9 %   


 


Monocytes (%) (Auto)  6.2 %   


 


Eosinophils (%) (Auto)  0.0 %   


 


Basophils (%) (Auto)  0.0 %   


 


Neutrophils # (Auto)


  


  10.40 K/uL


(1.4-6.5) 


  


 


 


Lymphocytes # (Auto)


  


  0.58 K/uL


(1.2-3.4) 


  


 


 


Monocytes # (Auto)


  


  0.73 K/uL


(0.11-0.59) 


  


 


 


Eosinophils # (Auto)


  


  0.00 K/uL


(0-0.5) 


  


 


 


Basophils # (Auto)


  


  0.00 K/uL


(0-0.2) 


  


 


 


RDW Standard Deviation


  


  51.6 fL


(36.4-46.3) 


  


 


 


RDW Coefficient of Variation


  


  14.5 %


(11.5-14.5) 


  


 


 


Immature Granulocyte % (Auto)  0.3 %   


 


Immature Granulocyte # (Auto)


  


  0.03 K/uL


(0.00-0.02) 


  


 


 


Anion Gap


  


  7.0 mmol/L


(3-11) 


  


 


 


BUN/Creatinine Ratio  25.9 (10-20)   


 


Calcium Level


  


  8.8 mg/dl


(8.5-10.1) 


  


 


 


Est Creatinine Clear Calc


Drug Dose 


  


  42.4 ml/min 


  


 


 


Estimated GFR (


American) 


  


  59.5 


  


 


 


Estimated GFR (Non-


American 


  


  51.3 


  


 


 


Bedside Glucose


  


  


  


  179 mg/dl


(70-90)


 


Test


  3/20/18


09:19 


  


  


 


 


Activated Partial


Thromboplast Time 195.6 SECONDS


(21.0-31.0) 


  


  


 


 


Partial Thromboplastin Ratio 7.5    











Assessment and Plan


78-year-old female with a past medical history of rheumatoid arthritis with 

interstitial lung disease, hypertension, diabetes, depression, paroxysmal 

atrial fibrillation presented to the ER with complaints of progressively 

worsening shortness of breath over the last few days.  Was found to be hypoxic 

in the ER and was placed on a BiPAP which seemed to have improved her mental 

status.  She was also found to be in A. fib with RVR and started on diltiazem 

drip with heparin drip for anticoagulation.








Acute hypoxic respiratory failure: Pneumonia with worsening interstitial lung 

disease


-Chest CT  revealed interval worsening of peribronchovascular consolidation 

involving all 5 lobes suggestive of infection versus diffuse alveolar damage or 

hemorrhage  on a background of chronic lung disease with peripheral predominant 

consolidation and fibrotic change with an upper lobe predominance.


This could suggest underlying chronic eosinophilic pneumonia, organizing 

pneumonia, or connective tissue disease.


-Continue BiPAP, may wean to NC as tolerated


-MRSA positive, influenza negative


-Continue cefepime, vancomycin and Levaquin.  


-Continue IV methylprednisolone 40 mg every 8 hours








NSTEMI:


-Troponins appear to be downtrending, 1.25-->1.18-->1.14-->0.98


-Likely secondary to demand ischemia


-Patient is allergic to aspirin


-Continue pravastatin


-Echo : . Normal left ventricular size and systolic function.  EF 60-65%.  No 

regional wall motion abnormalities.  Mild concentric left ventricular 

hypertrophy.


  *  2. The right ventricle is mildly dilated.


  *  3. The left atrium is severely dilated.


  *  4. The right atrium is moderately dilated.


  *  5. Aortic valve sclerosis mild, without significant aortic valvular 

stenosis.


  *  6. There is mild mitral regurgitation.


  *  7. There is severe tricuspid regurgitation.


  *  8. Top-normal right ventricular systolic pressure; 37mmHg.


  *  9. Technically difficult study, enhanced with IV Definity.


  *  10. Compared to prior study on 03/13/2012, atrial fibrillation has 

replaced sinus rhythm.  Tricuspid regurgitation now appears severe.





Atrial fibrillation with rapid ventricular rate:


-Has a history of paroxysmal atrial fib 


-Was started on Cardizem drip which was switched to p.o. Cardizem 90 mg every 6 

hours and atenolol 25 mg twice daily but patoient had episodes of bradycardia 

and the regimen had been modified to cardizem 60 mg q6h with atenolol 25 mg BID


- Discussed about need for anticoagulation for stroke prevention, switched 

heparin drip to xarelto





Hypertension :


-Continue atenolol and diltiazem


 


Hypervolemia:


-Echo as above 


-IV Lasix 40 mg daily, monitor I's and O's and daily weights





Transaminitis


-Likely secondary to liver congestion


- Trend LFTs





Steroid-induced hyperglycemia


-Monitor blood sugar





RA 


- Placed on IV steroids, 


- Continue Cimzia as outpt when recovers from PNM








Hyponatremia


-Sodium at 127


-Monitor BMP





DVT prophylaxis: 


Heparin drip





DNR





Disposition :currently on telemetry





Resident Physician Supervision Note:





I interviewed and examined the patient. Discussed with Dr. Fine and agree 

with findings and plan as documented in the note. Any exceptions or 

clarifications are listed here: None





Documented By:  Torres Woody


feeling better breathing better rates better controlled


vitals noted nad breathing unlabored lungs diminished bases faint rales bases 

more clear throughout than yesterday


a/p


mixed respiratory failure - hypoxia and hypercapnea - from infection 

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


-improving nicely, off bipap (may still need HS)


-abx, supportive care





afib - rate actually somewhat slow since adrenergic impulses have let up - 

lowering meds





otherwise as above





Resident Tracking


Resident Involvement:  Resident Care Provided


Care Provided:  Adult [facility]

## 2018-03-20 NOTE — PHARMACY PROGRESS NOTE
Pharmacy Abx Dose Short Note


Date of Service


Mar 20, 2018.





Assessment & Plan


Assessment








78 year old female receiving Vancomycin/cefepime/levofloxacin for treatment of 

pneumonia. 


Day # 3/7 of antimicrobial therapy.








Plan





Vancomycin


* Given weight decrease from admission, and bump in SCr, I reduced the 

vancomycin dose closer to 15mg/kg and slightly reduced the interval. However, 

her UOP remains adequate and renal function is noted to be variable, so will 

look for a trend going forward. 


* Retimed trough level to accommodate new interval. Level scheduled for 3/21 @

1830. 


Pharmacy will continue to follow and will adjust dose/frequency as necessary.  

Thank you.

## 2018-03-21 VITALS
SYSTOLIC BLOOD PRESSURE: 140 MMHG | TEMPERATURE: 97.34 F | HEART RATE: 72 BPM | DIASTOLIC BLOOD PRESSURE: 89 MMHG | OXYGEN SATURATION: 94 %

## 2018-03-21 VITALS — OXYGEN SATURATION: 96 %

## 2018-03-21 VITALS
HEART RATE: 62 BPM | SYSTOLIC BLOOD PRESSURE: 114 MMHG | TEMPERATURE: 98.42 F | OXYGEN SATURATION: 94 % | DIASTOLIC BLOOD PRESSURE: 81 MMHG

## 2018-03-21 VITALS
OXYGEN SATURATION: 96 % | HEART RATE: 62 BPM | DIASTOLIC BLOOD PRESSURE: 94 MMHG | TEMPERATURE: 96.98 F | SYSTOLIC BLOOD PRESSURE: 134 MMHG

## 2018-03-21 VITALS
HEART RATE: 88 BPM | OXYGEN SATURATION: 96 % | SYSTOLIC BLOOD PRESSURE: 140 MMHG | DIASTOLIC BLOOD PRESSURE: 92 MMHG | TEMPERATURE: 97.52 F

## 2018-03-21 VITALS
TEMPERATURE: 98.24 F | DIASTOLIC BLOOD PRESSURE: 90 MMHG | OXYGEN SATURATION: 98 % | HEART RATE: 67 BPM | SYSTOLIC BLOOD PRESSURE: 141 MMHG

## 2018-03-21 VITALS
TEMPERATURE: 97.88 F | HEART RATE: 65 BPM | DIASTOLIC BLOOD PRESSURE: 84 MMHG | OXYGEN SATURATION: 98 % | SYSTOLIC BLOOD PRESSURE: 133 MMHG

## 2018-03-21 VITALS — OXYGEN SATURATION: 94 %

## 2018-03-21 LAB
BUN SERPL-MCNC: 33 MG/DL (ref 7–18)
CALCIUM SERPL-MCNC: 8.5 MG/DL (ref 8.5–10.1)
CO2 SERPL-SCNC: 34 MMOL/L (ref 21–32)
CREAT SERPL-MCNC: 1.08 MG/DL (ref 0.6–1.2)
CREAT SERPL-MCNC: 1.09 MG/DL (ref 0.6–1.2)
EOSINOPHIL NFR BLD AUTO: 184 K/UL (ref 130–400)
GLUCOSE SERPL-MCNC: 170 MG/DL (ref 70–99)
HCT VFR BLD CALC: 32.9 % (ref 37–47)
HGB BLD-MCNC: 11.3 G/DL (ref 12–16)
MCH RBC QN AUTO: 32.6 PG (ref 25–34)
MCHC RBC AUTO-ENTMCNC: 34.3 G/DL (ref 32–36)
MCV RBC AUTO: 94.8 FL (ref 80–100)
PMV BLD AUTO: 9.9 FL (ref 7.4–10.4)
POTASSIUM SERPL-SCNC: 4.5 MMOL/L (ref 3.5–5.1)
PTT PATIENT: 34.7 SECONDS (ref 21–31)
RED CELL DISTRIBUTION WIDTH CV: 14.3 % (ref 11.5–14.5)
RED CELL DISTRIBUTION WIDTH SD: 49.4 FL (ref 36.4–46.3)
SODIUM SERPL-SCNC: 120 MMOL/L (ref 136–145)
WBC # BLD AUTO: 11.79 K/UL (ref 4.8–10.8)

## 2018-03-21 RX ADMIN — DILTIAZEM HYDROCHLORIDE SCH MG: 60 TABLET, FILM COATED ORAL at 21:38

## 2018-03-21 RX ADMIN — PANTOPRAZOLE SCH MG: 40 TABLET, DELAYED RELEASE ORAL at 11:47

## 2018-03-21 RX ADMIN — RIVAROXABAN SCH MG: 15 TABLET, FILM COATED ORAL at 17:07

## 2018-03-21 RX ADMIN — Medication SCH TAB: at 07:49

## 2018-03-21 RX ADMIN — VANCOMYCIN HYDROCHLORIDE SCH MLS/HR: 1 INJECTION, POWDER, LYOPHILIZED, FOR SOLUTION INTRAVENOUS at 03:15

## 2018-03-21 RX ADMIN — INSULIN ASPART SCH UNITS: 100 INJECTION, SOLUTION INTRAVENOUS; SUBCUTANEOUS at 11:51

## 2018-03-21 RX ADMIN — METHYLPREDNISOLONE SODIUM SUCCINATE SCH MLS/MIN: 1 INJECTION, POWDER, FOR SOLUTION INTRAMUSCULAR; INTRAVENOUS at 14:26

## 2018-03-21 RX ADMIN — INSULIN ASPART SCH UNITS: 100 INJECTION, SOLUTION INTRAVENOUS; SUBCUTANEOUS at 16:15

## 2018-03-21 RX ADMIN — CEFEPIME SCH MLS/MIN: 2 INJECTION, POWDER, FOR SOLUTION INTRAVENOUS at 04:29

## 2018-03-21 RX ADMIN — FUROSEMIDE SCH MLS/MIN: 10 INJECTION, SOLUTION INTRAMUSCULAR; INTRAVENOUS at 07:50

## 2018-03-21 RX ADMIN — VANCOMYCIN HYDROCHLORIDE SCH MLS/HR: 1 INJECTION, POWDER, LYOPHILIZED, FOR SOLUTION INTRAVENOUS at 20:45

## 2018-03-21 RX ADMIN — Medication SCH TAB: at 20:39

## 2018-03-21 RX ADMIN — DILTIAZEM HYDROCHLORIDE SCH MG: 60 TABLET, FILM COATED ORAL at 16:20

## 2018-03-21 RX ADMIN — ACETAMINOPHEN SCH MG: 325 TABLET ORAL at 17:54

## 2018-03-21 RX ADMIN — ACETAMINOPHEN SCH MG: 325 TABLET ORAL at 12:00

## 2018-03-21 RX ADMIN — INSULIN ASPART SCH UNITS: 100 INJECTION, SOLUTION INTRAVENOUS; SUBCUTANEOUS at 20:42

## 2018-03-21 RX ADMIN — LEVOFLOXACIN SCH MLS/HR: 5 INJECTION, SOLUTION INTRAVENOUS at 01:02

## 2018-03-21 RX ADMIN — DILTIAZEM HYDROCHLORIDE SCH MG: 60 TABLET, FILM COATED ORAL at 04:29

## 2018-03-21 RX ADMIN — INSULIN ASPART SCH UNITS: 100 INJECTION, SOLUTION INTRAVENOUS; SUBCUTANEOUS at 07:54

## 2018-03-21 RX ADMIN — ACETAMINOPHEN SCH MG: 325 TABLET ORAL at 11:50

## 2018-03-21 RX ADMIN — DILTIAZEM HYDROCHLORIDE SCH MG: 60 TABLET, FILM COATED ORAL at 09:57

## 2018-03-21 RX ADMIN — CEFEPIME SCH MLS/MIN: 2 INJECTION, POWDER, FOR SOLUTION INTRAVENOUS at 16:19

## 2018-03-21 RX ADMIN — ACETAMINOPHEN SCH MG: 325 TABLET ORAL at 06:02

## 2018-03-21 RX ADMIN — METHYLPREDNISOLONE SODIUM SUCCINATE SCH MLS/MIN: 1 INJECTION, POWDER, FOR SOLUTION INTRAMUSCULAR; INTRAVENOUS at 06:01

## 2018-03-21 RX ADMIN — DICLOFENAC SODIUM SCH APPLN: 10 GEL TOPICAL at 11:49

## 2018-03-21 RX ADMIN — ACETAMINOPHEN SCH MG: 325 TABLET ORAL at 23:39

## 2018-03-21 RX ADMIN — DICLOFENAC SODIUM SCH APPLN: 10 GEL TOPICAL at 07:51

## 2018-03-21 RX ADMIN — ONDANSETRON PRN MG: 2 INJECTION INTRAMUSCULAR; INTRAVENOUS at 16:23

## 2018-03-21 RX ADMIN — PRAVASTATIN SODIUM SCH MG: 40 TABLET ORAL at 20:40

## 2018-03-21 RX ADMIN — DICLOFENAC SODIUM SCH APPLN: 10 GEL TOPICAL at 16:19

## 2018-03-21 NOTE — FAMILY MEDICINE PROGRESS NOTE
Progress Note


Date of Service


Mar 21, 2018.





Subjective


Pt evaluation today including:  conversation w/ patient, physical exam, chart 

review, lab review


Pain:  denies any pain


Doing well.  No acute events overnight.  Was weaned to 4 L via nasal cannula 

from BiPAP which she had been tolerating well.  Usually uses 3 L at home.


Denies chest pain, shortness of breath, palpitations or dizziness





   Constitutional:  No fever, No chills


   Eyes:  No worsening of vision


   ENT:  No hearing loss


   Respiratory:  No cough, No sputum


   Cardiovascular:  No chest pain


   Abdomen:  No pain, No nausea, No vomiting





Medications





Current Inpatient Medications








 Medications


  (Trade)  Dose


 Ordered  Sig/Walter


 Route  Start Time


 Stop Time Status Last Admin


Dose Admin


 


 Al Hydrox/Mg


 Hydrox/Simethicone


  (Maalox Max Susp)  15 ml  Q4H  PRN


 PO  3/19/18 00:15


 4/18/18 00:14   


 


 


 Magnesium


 Hydroxide


  (Milk Of


 Magnesia Susp)  30 ml  Q12H  PRN


 PO  3/19/18 00:15


 4/18/18 00:14   


 


 


 Ondansetron HCl


  (Zofran Inj)  4 mg  Q6H  PRN


 IV  3/19/18 00:15


 4/18/18 00:14   


 


 


 Nitroglycerin


  (Nitrostat Tab)  0.4 mg  UD  PRN


 SL  3/19/18 00:15


 4/18/18 00:14   


 


 


 Polyethylene


  (Miralax Powder


 Packet)  17 gm  DAILY  PRN


 PO  3/19/18 00:15


 4/18/18 00:14   


 


 


 Methylprednisolone


 Sodium Succinate


 40 mg/Syringe  0.64 ml @ 


 1.5 mls/min  Q8H


 IV  3/19/18 06:00


 4/18/18 00:14  3/21/18 06:01


1.5 MLS/MIN


 


 Acetaminophen/


 Butalbital/


 Caffeine


  (Fioricet Tab)  1 tab  DAILY  PRN


 PO  3/19/18 00:15


 4/18/18 00:14   


 


 


 Calcium/Vitamin D


  (Caltrate Plus


 Tab)  1 tab  BID


 PO  3/19/18 09:00


 4/18/18 08:59  3/21/18 07:49


1 TAB


 


 Clonazepam


  (Klonopin Tab)  0.5 mg  HS  PRN


 PO  3/19/18 00:15


 4/18/18 00:14   


 


 


 Multivitamins/


 Minerals


  (Multivitamin W/


 Minerals Tab)  1 tab  BID


 PO  3/19/18 09:00


 4/18/18 08:59  3/21/18 07:49


1 TAB


 


 Pantoprazole


 Sodium


  (Protonix Tab)  40 mg  QDL


 PO  3/19/18 11:00


 4/18/18 10:59  3/20/18 09:17


40 MG


 


 Pravastatin Sodium


  (Pravachol Tab)  80 mg  QPM


 PO  3/19/18 21:00


 4/18/18 20:59  3/20/18 19:23


80 MG


 


 Furosemide 40 mg/


 Syringe  4 ml @ 4


 mls/min  DAILY


 IV  3/19/18 09:00


 4/18/18 08:59  3/21/18 07:50


4 MLS/MIN


 


 Levofloxacin 750


 mg/Prmx  150 ml @ 


 100 mls/hr  Q24H


 IV  3/19/18 01:00


 3/26/18 00:59  3/21/18 01:02


100 MLS/HR


 


 Miscellaneous


 Information


  (Consult)  1 ea  UD  PRN


 N/A  3/19/18 00:45


 4/18/18 00:44   


 


 


 Miscellaneous


  (Iv Fluids


 Completed)  1 ea  PRN  PRN


 N/A  3/19/18 03:30


 3/19/19 03:29   


 


 


 Cefepime HCl 2000


 mg/Syringe  20 ml @ 5


 mls/min  Q12H


 IV  3/19/18 04:00


 3/26/18 03:59  3/21/18 04:29


5 MLS/MIN


 


 Diltiazem HCl 125


 mg/Dextrose  125 ml @ 0


 mls/hr  Q0M PRN


 IV  3/19/18 04:15


 4/18/18 04:14  3/19/18 04:42


10 MLS/HR


 


 Diclofenac Sodium


  (Voltaren 1% Top


 Gel)  1 appln  3XDQ4


 EXT  3/19/18 16:00


 4/18/18 15:59  3/21/18 07:51


1 APPLN


 


 Glucagon


  (Glucagon Inj)  1 mg  UD  PRN


 SQ  3/19/18 19:15


 4/18/18 19:14   


 


 


 Glucose


  (Glucose 40% Gel)  15-30


 GRAMS 15


 GRAMS...  UD  PRN


 PO  3/19/18 19:15


 4/18/18 19:14   


 


 


 Glucose


  (Glucose Chew


 Tab)  4-8


 Tablets 4


 Tabl...  UD  PRN


 PO  3/19/18 19:15


 4/18/18 19:14   


 


 


 Insulin Aspart


  (novoLOG ASPART)  **SLIDING


 SCALE**


 **If C...  ACHS


 SC  3/20/18 07:00


 4/19/18 06:59  3/21/18 07:54


4 UNITS


 


 Diltiazem HCl


  (Cardizem Tab)  60 mg  Q6H


 PO  3/20/18 04:00


 4/19/18 03:59  3/21/18 04:29


60 MG


 


 Acetaminophen


  (Tylenol Tab)  650 mg  Q6H


 PO  3/20/18 06:00


 4/19/18 05:59  3/21/18 06:02


650 MG


 


 Vancomycin HCl


 1000 mg/Sodium


 Chloride  270 ml @ 


 125 mls/hr  Q18H


 IV  3/21/18 03:00


 3/28/18 02:59  3/21/18 03:15


125 MLS/HR


 


 Rivaroxaban


  (Xarelto Tab)  20 mg  HS


 PO  3/20/18 21:00


 4/19/18 20:59  3/20/18 19:23


20 MG


 


 Atenolol


  (Tenormin Tab)  25 mg  QPM


 PO  3/21/18 21:00


 4/18/18 20:59   


 











Objective


Vital Signs











  Date Time  Temp Pulse Resp B/P (MAP) Pulse Ox O2 Delivery O2 Flow Rate FiO2


 


3/21/18 08:00      Nasal Cannula 5.0 


 


3/21/18 07:20 36.1 62 18 134/94 (107) 96 Nasal Cannula 4.0 


 


3/21/18 04:00      Nasal Cannula 4.0 


 


3/21/18 03:38 36.8 67 20 141/90 (107) 98 Nasal Cannula 4.0 





      Humidified Oxygen  


 


3/21/18 00:00      Nasal Cannula 4.0 


 


3/20/18 23:10 36.8 65 16 152/81 (104) 98 Nasal Cannula 4.0 





      Humidified Oxygen  


 


3/20/18 20:00      Nasal Cannula 4.0 


 


3/20/18 18:58 36.7 74 22 144/86 (105) 98 Nasal Cannula 4.0 


 


3/20/18 16:00      Nasal Cannula 4.0 


 


3/20/18 15:25 36.5 84 20 132/87 (102) 96 Nasal Cannula 4.0 


 


3/20/18 12:00      Nasal Cannula 4.0 


 


3/20/18 11:49 36.6 69 22 132/76 (94) 95 Nasal Cannula 4.0 











Physical Exam


General Appearance:  WD/WN, no apparent distress


Eyes:  normal inspection


ENT:  hearing grossly normal


Neck:  supple


Respiratory/Chest:  chest non-tender, normal breath sounds, + crackles (

Diffusely), + rhonchi


Cardiovascular:  regular rate, rhythm


Abdomen:  normal bowel sounds, non tender, soft


Extremities:  + pedal edema


Neurologic/Psychiatric:  alert, normal mood/affect, oriented x 3


Skin:  normal color





Laboratory Results


3/21/18 04:49








3/21/18 07:39

















Test


  3/21/18


04:49 3/21/18


06:44 3/21/18


07:39


 


Red Blood Count


  3.47 M/uL


(4.2-5.4) 


  


 


 


Mean Corpuscular Volume


  94.8 fL


() 


  


 


 


Mean Corpuscular Hemoglobin


  32.6 pg


(25-34) 


  


 


 


Mean Corpuscular Hemoglobin


Concent 34.3 g/dl


(32-36) 


  


 


 


RDW Standard Deviation


  49.4 fL


(36.4-46.3) 


  


 


 


RDW Coefficient of Variation


  14.3 %


(11.5-14.5) 


  


 


 


Mean Platelet Volume


  9.9 fL


(7.4-10.4) 


  


 


 


Activated Partial


Thromboplast Time 34.7 SECONDS


(21.0-31.0) 


  


 


 


Partial Thromboplastin Ratio 1.3   


 


Bedside Glucose


  


  194 mg/dl


(70-90) 


 


 


Anion Gap


  


  


  7.0 mmol/L


(3-11)


 


Est Creatinine Clear Calc


Drug Dose 


  


  40.4 ml/min 


 


 


Estimated GFR (


American) 


  


  56.1 


 


 


Estimated GFR (Non-


American 


  


  48.4 


 


 


BUN/Creatinine Ratio   30.1 (10-20) 


 


Calcium Level


  


  


  8.5 mg/dl


(8.5-10.1)











Assessment and Plan


78-year-old female with a past medical history of rheumatoid arthritis with 

interstitial lung disease, hypertension, diabetes, depression, paroxysmal 

atrial fibrillation presented to the ER with complaints of progressively 

worsening shortness of breath over the last few days.  


Is currently on NC with 4 L o2, afib appears to be well controlled 





Acute hypoxic respiratory failure: Pneumonia with worsening interstitial lung 

disease


-Chest CT  revealed interval worsening of peribronchovascular consolidation 

involving all 5 lobes suggestive of infection versus diffuse alveolar damage or 

hemorrhage  on a background of chronic lung disease with peripheral predominant 

consolidation and fibrotic change with an upper lobe predominance.


This could suggest underlying chronic eosinophilic pneumonia, organizing 

pneumonia, or connective tissue disease.


-Weaned to NC, tolerating 4 L NC


-MRSA positive, influenza negative


-Continue cefepime, vancomycin and switched to p.o. azithromycin


-IV Solu-Medrol switched to p.o. prednisone 60 mg








NSTEMI:


-Troponins appear to be downtrending, 1.25-->1.18-->1.14-->0.98


-Likely secondary to demand ischemia


-Patient is allergic to aspirin


-Continue pravastatin


-Echo : . Normal left ventricular size and systolic function.  EF 60-65%.  No 

regional wall motion abnormalities.  Mild concentric left ventricular 

hypertrophy.


  *  2. The right ventricle is mildly dilated.


  *  3. The left atrium is severely dilated.


  *  4. The right atrium is moderately dilated.


  *  5. Aortic valve sclerosis mild, without significant aortic valvular 

stenosis.


  *  6. There is mild mitral regurgitation.


  *  7. There is severe tricuspid regurgitation.


  *  8. Top-normal right ventricular systolic pressure; 37mmHg.


  *  9. Technically difficult study, enhanced with IV Definity.


  *  10. Compared to prior study on 03/13/2012, atrial fibrillation has 

replaced sinus rhythm.  Tricuspid regurgitation now appears severe.





Atrial fibrillation with rapid ventricular rate:


-Has a history of paroxysmal atrial fib 


-Was started on Cardizem drip which was switched to p.o. Cardizem 90 mg every 6 

hours and atenolol 25 mg twice daily but patoient had episodes of bradycardia 

and the regimen had been modified to cardizem 60 mg q6h with atenolol 25 mg qpm


- Continue xarelto qpm





Hypertension :


-Continue atenolol and diltiazem


 


Hypervolemia:


-Echo as above 


-IV Lasix 40 mg daily, monitor I's and O's and daily weights





Transaminitis


-Likely secondary to liver congestion


- Trend LFTs





Steroid-induced hyperglycemia


-Monitor blood sugar





RA 


- Placed on steroids


- Continue Cimzia as outpt when recovers from PNM








Hyponatremia


-Sodium at 120


-Urine osmolality and serum osmolality ordered


-?  SIADH secondary to chronic lung disease


-Monitor BMP





DVT prophylaxis: 


Heparin drip





DNR





Resident Physician Supervision Note:





I interviewed and examined the patient. Discussed with Dr. Fine and agree 

with findings and plan as documented in the note. Any exceptions or 

clarifications are listed here: None





Documented By:  Torres Woody


feeling better but is having a little bit of hallucinations, no other new 

complaints


vitals noted nad breathing unlabored lungs improving more clear less rales


a/p


mixed respiratory failure - hypoxia and hypercapnea - from infection 

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


-improving nicely, down to almost home levels of O2


-abx, supportive care





afib - rates now reasonable.  continue current meds





hyponatremia - suspect baseline SiADH, check urine and serum osms, likely will 

need to fluid restrict





otherwise as above





otherwise as above





Resident Tracking


Resident Involvement:  Resident Care Provided


Care Provided:  [facility]ult [facility] Medicine

## 2018-03-22 VITALS
HEART RATE: 68 BPM | TEMPERATURE: 97.52 F | SYSTOLIC BLOOD PRESSURE: 116 MMHG | DIASTOLIC BLOOD PRESSURE: 69 MMHG | OXYGEN SATURATION: 97 %

## 2018-03-22 VITALS
DIASTOLIC BLOOD PRESSURE: 77 MMHG | TEMPERATURE: 98.06 F | SYSTOLIC BLOOD PRESSURE: 145 MMHG | HEART RATE: 59 BPM | OXYGEN SATURATION: 97 %

## 2018-03-22 VITALS
SYSTOLIC BLOOD PRESSURE: 129 MMHG | TEMPERATURE: 97.7 F | HEART RATE: 79 BPM | OXYGEN SATURATION: 96 % | DIASTOLIC BLOOD PRESSURE: 75 MMHG

## 2018-03-22 VITALS
HEART RATE: 65 BPM | OXYGEN SATURATION: 96 % | DIASTOLIC BLOOD PRESSURE: 67 MMHG | SYSTOLIC BLOOD PRESSURE: 124 MMHG | TEMPERATURE: 97.52 F

## 2018-03-22 VITALS
OXYGEN SATURATION: 95 % | DIASTOLIC BLOOD PRESSURE: 93 MMHG | TEMPERATURE: 98.6 F | HEART RATE: 77 BPM | SYSTOLIC BLOOD PRESSURE: 137 MMHG

## 2018-03-22 VITALS — SYSTOLIC BLOOD PRESSURE: 145 MMHG | HEART RATE: 74 BPM | DIASTOLIC BLOOD PRESSURE: 79 MMHG

## 2018-03-22 VITALS
OXYGEN SATURATION: 91 % | TEMPERATURE: 97.7 F | SYSTOLIC BLOOD PRESSURE: 136 MMHG | HEART RATE: 78 BPM | DIASTOLIC BLOOD PRESSURE: 73 MMHG

## 2018-03-22 VITALS — DIASTOLIC BLOOD PRESSURE: 75 MMHG | SYSTOLIC BLOOD PRESSURE: 129 MMHG | HEART RATE: 68 BPM

## 2018-03-22 VITALS — OXYGEN SATURATION: 91 %

## 2018-03-22 LAB
BUN SERPL-MCNC: 40 MG/DL (ref 7–18)
CALCIUM SERPL-MCNC: 8.3 MG/DL (ref 8.5–10.1)
CO2 SERPL-SCNC: 38 MMOL/L (ref 21–32)
CREAT SERPL-MCNC: 1.3 MG/DL (ref 0.6–1.2)
CREAT SERPL-MCNC: 1.33 MG/DL (ref 0.6–1.2)
GLUCOSE SERPL-MCNC: 186 MG/DL (ref 70–99)
POTASSIUM SERPL-SCNC: 4.5 MMOL/L (ref 3.5–5.1)
SODIUM SERPL-SCNC: 122 MMOL/L (ref 136–145)

## 2018-03-22 RX ADMIN — ACETAMINOPHEN PRN MG: 325 TABLET ORAL at 22:08

## 2018-03-22 RX ADMIN — DILTIAZEM HYDROCHLORIDE SCH MG: 60 TABLET, FILM COATED ORAL at 03:06

## 2018-03-22 RX ADMIN — CEFEPIME SCH MLS/MIN: 2 INJECTION, POWDER, FOR SOLUTION INTRAVENOUS at 03:07

## 2018-03-22 RX ADMIN — DICLOFENAC SODIUM SCH APPLN: 10 GEL TOPICAL at 17:17

## 2018-03-22 RX ADMIN — DOXYCYCLINE HYCLATE SCH MG: 100 CAPSULE, GELATIN COATED ORAL at 21:52

## 2018-03-22 RX ADMIN — Medication SCH TAB: at 08:31

## 2018-03-22 RX ADMIN — FUROSEMIDE SCH MLS/MIN: 10 INJECTION, SOLUTION INTRAMUSCULAR; INTRAVENOUS at 08:32

## 2018-03-22 RX ADMIN — DILTIAZEM HYDROCHLORIDE SCH MG: 60 TABLET, FILM COATED ORAL at 17:15

## 2018-03-22 RX ADMIN — Medication SCH TAB: at 21:52

## 2018-03-22 RX ADMIN — DILTIAZEM HYDROCHLORIDE SCH MG: 60 TABLET, FILM COATED ORAL at 21:55

## 2018-03-22 RX ADMIN — ACETAMINOPHEN SCH MG: 325 TABLET ORAL at 00:00

## 2018-03-22 RX ADMIN — DICLOFENAC SODIUM SCH APPLN: 10 GEL TOPICAL at 12:24

## 2018-03-22 RX ADMIN — ACETAMINOPHEN SCH MG: 325 TABLET ORAL at 06:00

## 2018-03-22 RX ADMIN — DILTIAZEM HYDROCHLORIDE SCH MG: 60 TABLET, FILM COATED ORAL at 10:25

## 2018-03-22 RX ADMIN — Medication SCH TAB: at 08:32

## 2018-03-22 RX ADMIN — INSULIN ASPART SCH UNITS: 100 INJECTION, SOLUTION INTRAVENOUS; SUBCUTANEOUS at 17:14

## 2018-03-22 RX ADMIN — ONDANSETRON PRN MG: 2 INJECTION INTRAMUSCULAR; INTRAVENOUS at 11:07

## 2018-03-22 RX ADMIN — INSULIN ASPART SCH UNITS: 100 INJECTION, SOLUTION INTRAVENOUS; SUBCUTANEOUS at 09:26

## 2018-03-22 RX ADMIN — PANTOPRAZOLE SCH MG: 40 TABLET, DELAYED RELEASE ORAL at 12:24

## 2018-03-22 RX ADMIN — RIVAROXABAN SCH MG: 15 TABLET, FILM COATED ORAL at 17:16

## 2018-03-22 RX ADMIN — DICLOFENAC SODIUM SCH APPLN: 10 GEL TOPICAL at 08:30

## 2018-03-22 RX ADMIN — INSULIN ASPART SCH UNITS: 100 INJECTION, SOLUTION INTRAVENOUS; SUBCUTANEOUS at 21:51

## 2018-03-22 RX ADMIN — PRAVASTATIN SODIUM SCH MG: 40 TABLET ORAL at 21:53

## 2018-03-22 RX ADMIN — Medication SCH TAB: at 21:48

## 2018-03-22 RX ADMIN — INSULIN ASPART SCH UNITS: 100 INJECTION, SOLUTION INTRAVENOUS; SUBCUTANEOUS at 12:25

## 2018-03-22 NOTE — CARDIOLOGY PROGRESS NOTE
DATE: 03/22/2018

 

TIME:  09:58 a.m.

 

SUBJECTIVE:  Ms. Solomon feels much better.  She admits that she did have some

confusion overnight and she was unable to fall asleep until approximately

03:00 a.m.  She feels as though her thinking is back to baseline.  Her

breathing continues to improve.  She denies chest pain, palpitations,

syncope, near syncope or bleeding.

 

OBJECTIVE:

VITAL SIGNS:  Temperature 36.4 degrees, heart rate 65 beats per minute,

respiratory rate 18, blood pressure 124/67 mmHg, and oxygen saturation 96% on

4 liters per nasal cannula.  I's and O's negative 1.3 liters yesterday. 

Weight is pending.

GENERAL:  No acute distress.  She is alert.

NECK:  No appreciable JVD.

CARDIAC EXAM:  No ventricular heave.  Irregularly irregular.  Normal S1 and

S2.  There were no audible murmurs, rubs or gallops.

LUNGS:  Bilateral crackles sparing the apical areas.

ABDOMEN:  Soft, nontender, and nondistended.  Normoactive bowel sounds.

EXTREMITIES:  1-2+ bilateral lower extremity edema.  No cyanosis.

PSYCHIATRIC:  Affect appears appropriate.

 

MEDICATIONS:  Include Lasix 40 mg IV b.i.d., atenolol 25 mg q.p.m.,

azithromycin 250 mg daily, cefepime 2 grams IV q. 12 hours, diltiazem 60 mg

p.o. q. 6 hours, methylprednisolone 40 mg IV q. 8 hours, last dose yesterday

and it has since been discontinued, prednisone 60 mg starting today,

pravastatin 80 mg daily, Protonix 40 mg daily, and Xarelto 15 mg.

 

Telemetry personally reviewed.  Atrial fibrillation with adequate rate

control.

 

LABORATORY DATA:  Sodium 122 up from 120, potassium 4.5, BUN 40 up from 33,

creatinine 1.33 up from 1.08.

 

ASSESSMENT AND PLAN:

1.  Atrial fibrillation with rapid ventricular response:  Her rapid

ventricular response is likely secondary to presenting with hypoxia and her

pneumonia in the setting of interstitial lung disease.  Her medications were

first titrated and now, she is back to her baseline doses; however, her

diltiazem is in short acting form.  Her heart rate is adequately controlled. 

Continue current regimen.  Continue anticoagulation for stroke risk reduction

if no contraindications.

2.  Non-ST elevation myocardial infarction:  She did have questions about

this today.  Her presentation was not that of an acute coronary syndrome. 

She denied any angina.  Her troponin elevation was likely secondary to demand

ischemia from hypoxia, atrial fibrillation with rapid ventricular response,

and multifocal pneumonia.  SHE IS ALLERGIC TO ASPIRIN.  She is

anticoagulated.  Continue beta blocker and statin therapy.

3.  Tricuspid regurgitation:  Severe on echocardiogram during this

hospitalization.  She may have residual edema secondary to tricuspid

regurgitation.  Conservative management recommended.

4.  Hypertension:  Blood pressure acceptable.  Continue current regimen.

5.  Hypervolemia:  She continues to diurese with Lasix.  Her BUN and

creatinine have started to increase.  She appears azotemic.  Consider backing

off on diuretic.  She may have residual edema secondary to her tricuspid

regurgitation.

6.  Pneumonia with interstitial lung disease:  As per primary service.

7.  Disposition:  Cardiology will continue to follow.

## 2018-03-22 NOTE — FAMILY MEDICINE PROGRESS NOTE
Progress Note


Date of Service


Mar 22, 2018.





Subjective


Pt evaluation today including:  conversation w/ patient, physical exam, chart 

review, lab review


Pain:  right shoulder pian which is chronic


PO Intake:  good


appears a little confused. had received klonopin yesterday for insomnia





   Constitutional:  No fever, No chills


   Eyes:  No worsening of vision


   ENT:  No hearing loss


   Respiratory:  No cough, No shortness of breath


   Abdomen:  No pain, No nausea


   Female :  No dysuria, No urinary frequency


   Neurologic:  No memory loss


   Endo:  No fatigue





Medications





Current Inpatient Medications








 Medications


  (Trade)  Dose


 Ordered  Sig/Walter


 Route  Start Time


 Stop Time Status Last Admin


Dose Admin


 


 Al Hydrox/Mg


 Hydrox/Simethicone


  (Maalox Max Susp)  15 ml  Q4H  PRN


 PO  3/19/18 00:15


 4/18/18 00:14   


 


 


 Magnesium


 Hydroxide


  (Milk Of


 Magnesia Susp)  30 ml  Q12H  PRN


 PO  3/19/18 00:15


 4/18/18 00:14   


 


 


 Ondansetron HCl


  (Zofran Inj)  4 mg  Q6H  PRN


 IV  3/19/18 00:15


 4/18/18 00:14  3/22/18 11:07


4 MG


 


 Nitroglycerin


  (Nitrostat Tab)  0.4 mg  UD  PRN


 SL  3/19/18 00:15


 4/18/18 00:14   


 


 


 Polyethylene


  (Miralax Powder


 Packet)  17 gm  DAILY  PRN


 PO  3/19/18 00:15


 4/18/18 00:14   


 


 


 Acetaminophen/


 Butalbital/


 Caffeine


  (Fioricet Tab)  1 tab  DAILY  PRN


 PO  3/19/18 00:15


 4/18/18 00:14   


 


 


 Calcium/Vitamin D


  (Caltrate Plus


 Tab)  1 tab  BID


 PO  3/19/18 09:00


 4/18/18 08:59  3/22/18 21:48


1 TAB


 


 Clonazepam


  (Klonopin Tab)  0.5 mg  HS  PRN


 PO  3/19/18 00:15


 4/18/18 00:14  3/22/18 03:06


0.5 MG


 


 Multivitamins/


 Minerals


  (Multivitamin W/


 Minerals Tab)  1 tab  BID


 PO  3/19/18 09:00


 4/18/18 08:59  3/22/18 21:52


1 TAB


 


 Pantoprazole


 Sodium


  (Protonix Tab)  40 mg  QDL


 PO  3/19/18 11:00


 4/18/18 10:59  3/22/18 12:24


40 MG


 


 Pravastatin Sodium


  (Pravachol Tab)  80 mg  QPM


 PO  3/19/18 21:00


 4/18/18 20:59  3/22/18 21:53


80 MG


 


 Miscellaneous


  (Iv Fluids


 Completed)  1 ea  PRN  PRN


 N/A  3/19/18 03:30


 3/19/19 03:29   


 


 


 Diclofenac Sodium


  (Voltaren 1% Top


 Gel)  1 appln  3XDQ4


 EXT  3/19/18 16:00


 4/18/18 15:59  3/22/18 17:17


1 APPLN


 


 Glucagon


  (Glucagon Inj)  1 mg  UD  PRN


 SQ  3/19/18 19:15


 4/18/18 19:14   


 


 


 Glucose


  (Glucose 40% Gel)  15-30


 GRAMS 15


 GRAMS...  UD  PRN


 PO  3/19/18 19:15


 4/18/18 19:14   


 


 


 Glucose


  (Glucose Chew


 Tab)  4-8


 Tablets 4


 Tabl...  UD  PRN


 PO  3/19/18 19:15


 4/18/18 19:14   


 


 


 Insulin Aspart


  (novoLOG ASPART)  **SLIDING


 SCALE**


 **If C...  ACHS


 SC  3/20/18 07:00


 4/19/18 06:59  3/22/18 21:51


4 UNITS


 


 Diltiazem HCl


  (Cardizem Tab)  60 mg  Q6H


 PO  3/20/18 04:00


 4/19/18 03:59  3/22/18 21:55


60 MG


 


 Atenolol


  (Tenormin Tab)  25 mg  QPM


 PO  3/21/18 21:00


 4/18/18 20:59  3/22/18 21:57


25 MG


 


 Rivaroxaban


  (Xarelto Tab)  15 mg  QDD


 PO  3/21/18 16:45


 4/20/18 16:44  3/22/18 17:16


15 MG


 


 Prednisone


  (PredniSONE TAB)  60 mg  DAILY


 PO  3/22/18 09:00


 4/21/18 08:59  3/22/18 08:30


60 MG


 


 Levofloxacin


  (Levaquin Tab)  750 mg  Q2D@1100


 PO  3/23/18 11:00


 3/26/18 10:59   


 


 


 Doxycycline


 Hyclate


  (Vibramycin Cap)  100 mg  BID


 PO  3/22/18 21:00


 3/29/18 20:59  3/22/18 21:52


100 MG


 


 Furosemide


  (Lasix Tab)  40 mg  QAM


 PO  3/23/18 09:00


 4/22/18 08:59   


 


 


 Acetaminophen


  (Tylenol Tab)  650 mg  Q6H  PRN


 PO  3/22/18 10:45


 4/21/18 10:44  3/22/18 22:08


650 MG


 


 Levofloxacin


  (Consult)  1 ea  UD  PRN


 N/A  3/22/18 12:15


 4/21/18 12:14   


 











Objective


Vital Signs











  Date Time  Temp Pulse Resp B/P (MAP) Pulse Ox O2 Delivery O2 Flow Rate FiO2


 


3/23/18 00:00      Nasal Cannula 3.0 


 


3/22/18 23:48 36.7 59 18 145/77 (99) 97  2.0 


 


3/22/18 21:56  74  145/79 (101)    


 


3/22/18 16:00     91 Nasal Cannula 3.0 


 


3/22/18 15:23 36.5 78 20 136/73 (94) 91 Nasal Cannula 3.0 


 


3/22/18 12:43 36.4 68 18 116/69 (85) 97 Nasal Cannula 3.0 





      Humidified Oxygen  


 


3/22/18 12:00      Nasal Cannula 3.0 


 


3/22/18 11:32  68      


 


3/22/18 11:08 36.5 79 22 129/75 (93) 96 Nasal Cannula 3.0 


 


3/22/18 08:00      Nasal Cannula 3.0 


 


3/22/18 07:17 36.4 65 18 124/67 (86) 96 Nasal Cannula 4.0 





      Humidified Oxygen  











Laboratory Results











Test


  3/22/18


21:20


 


Bedside Glucose


  222 mg/dl


(70-90)











Assessment and Plan


78-year-old female with a past medical history of rheumatoid arthritis with 

interstitial lung disease, hypertension, diabetes, depression, paroxysmal 

atrial fibrillation presented to the ER with complaints of progressively 

worsening shortness of breath over the last few days.  


Is currently on NC with 4 L o2, afib appears to be well controlled 





Acute hypoxic respiratory failure: Pneumonia with worsening interstitial lung 

disease


-Chest CT  revealed interval worsening of peribronchovascular consolidation 

involving all 5 lobes suggestive of infection versus diffuse alveolar damage or 

hemorrhage  on a background of chronic lung disease with peripheral predominant 

consolidation and fibrotic change with an upper lobe predominance.


This could suggest underlying chronic eosinophilic pneumonia, organizing 

pneumonia, or connective tissue disease.


-Weaned to NC, tolerating 4 L NC


-MRSA positive, influenza negative


-Switched to doxycycline and levaquin


-IV Solu-Medrol switched to p.o. prednisone 60 mg








NSTEMI:


-Troponins appear to be downtrending, 1.25-->1.18-->1.14-->0.98


-Likely secondary to demand ischemia


-Patient is allergic to aspirin


-Continue pravastatin


-Echo : . Normal left ventricular size and systolic function.  EF 60-65%.  No 

regional wall motion abnormalities.  Mild concentric left ventricular 

hypertrophy.


  *  2. The right ventricle is mildly dilated.


  *  3. The left atrium is severely dilated.


  *  4. The right atrium is moderately dilated.


  *  5. Aortic valve sclerosis mild, without significant aortic valvular 

stenosis.


  *  6. There is mild mitral regurgitation.


  *  7. There is severe tricuspid regurgitation.


  *  8. Top-normal right ventricular systolic pressure; 37mmHg.


  *  9. Technically difficult study, enhanced with IV Definity.


  *  10. Compared to prior study on 03/13/2012, atrial fibrillation has 

replaced sinus rhythm.  Tricuspid regurgitation now appears severe.





Atrial fibrillation with rapid ventricular rate:


-Has a history of paroxysmal atrial fib 


-Was started on Cardizem drip which was switched to p.o. Cardizem 90 mg every 6 

hours and atenolol 25 mg twice daily but patoient had episodes of bradycardia 

and the regimen had been modified to cardizem 60 mg q6h with atenolol 25 mg qpm


- Continue xarelto qpm





Hypertension :


-Continue atenolol and diltiazem


 


Hypervolemia:


-Echo as above 


 - switched to PO lasix, monitor I's and O's and daily weights





Steroid-induced hyperglycemia


-Monitor blood sugar





RA 


- Placed on steroids


- Continue Cimzia as outpt when recovers from PNM








Hyponatremia


-Sodium at 122-- improving


-Urine osmolality 294 and serum osmolality 277


-Monitor BMP





DVT prophylaxis: 


Heparin drip





DNR














Resident Physician Supervision Note:





I interviewed and examined the patient. Discussed with Dr. Fine and agree 

with findings and plan as documented in the note. Any exceptions or 

clarifications are listed here: None





Documented By:  Torres Woody


had a rough night - just with not being able to sleep then feeling confused/

hallucinations - but kept remembering being told that she was a little 

delirious and that seemed to help her keep her awareness better


vitals noted nad breathing unlabored lungs improving actually mostly clear just 

sl quiet at base


a/p


mixed respiratory failure - hypoxia and hypercapnea - from infection 

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


-improving well, down to home levels of O2


-abx, supportive care, stable for med surg





afib - rate control reasonable.  continue current meds





hyponatremia - sl improvement.  likely mixed picture.  continue current care





otherwise as above





Resident Tracking


Resident Involvement:  Resident Care Provided


Care Provided:  Adult Hospital Medicine

## 2018-03-23 VITALS
HEART RATE: 77 BPM | TEMPERATURE: 97.88 F | SYSTOLIC BLOOD PRESSURE: 145 MMHG | OXYGEN SATURATION: 98 % | DIASTOLIC BLOOD PRESSURE: 90 MMHG

## 2018-03-23 VITALS — OXYGEN SATURATION: 96 % | HEART RATE: 60 BPM

## 2018-03-23 VITALS
OXYGEN SATURATION: 97 % | TEMPERATURE: 97.52 F | DIASTOLIC BLOOD PRESSURE: 84 MMHG | HEART RATE: 77 BPM | SYSTOLIC BLOOD PRESSURE: 138 MMHG

## 2018-03-23 VITALS — OXYGEN SATURATION: 99 %

## 2018-03-23 VITALS
OXYGEN SATURATION: 95 % | DIASTOLIC BLOOD PRESSURE: 77 MMHG | TEMPERATURE: 96.8 F | SYSTOLIC BLOOD PRESSURE: 128 MMHG | HEART RATE: 74 BPM

## 2018-03-23 VITALS — HEART RATE: 86 BPM | OXYGEN SATURATION: 96 %

## 2018-03-23 VITALS — OXYGEN SATURATION: 98 %

## 2018-03-23 LAB
ALBUMIN SERPL-MCNC: 2.3 GM/DL (ref 3.4–5)
ALP SERPL-CCNC: 260 U/L (ref 45–117)
ALT SERPL-CCNC: 101 U/L (ref 12–78)
AST SERPL-CCNC: 38 U/L (ref 15–37)
BUN SERPL-MCNC: 47 MG/DL (ref 7–18)
CALCIUM SERPL-MCNC: 8.4 MG/DL (ref 8.5–10.1)
CO2 SERPL-SCNC: 36 MMOL/L (ref 21–32)
CREAT SERPL-MCNC: 1.41 MG/DL (ref 0.6–1.2)
EOSINOPHIL NFR BLD AUTO: 177 K/UL (ref 130–400)
GLUCOSE SERPL-MCNC: 160 MG/DL (ref 70–99)
HCT VFR BLD CALC: 37.3 % (ref 37–47)
HGB BLD-MCNC: 12.5 G/DL (ref 12–16)
MCH RBC QN AUTO: 32.1 PG (ref 25–34)
MCHC RBC AUTO-ENTMCNC: 33.5 G/DL (ref 32–36)
MCV RBC AUTO: 95.6 FL (ref 80–100)
NRBC BLD AUTO-RTO: 0.1 %
NUCLEATED RED BLOOD CELL ABS: 0.02 K/UL (ref 0–0)
PMV BLD AUTO: 9.3 FL (ref 7.4–10.4)
POTASSIUM SERPL-SCNC: 4.6 MMOL/L (ref 3.5–5.1)
PROT SERPL-MCNC: 6.5 GM/DL (ref 6.4–8.2)
RED CELL DISTRIBUTION WIDTH CV: 14.4 % (ref 11.5–14.5)
RED CELL DISTRIBUTION WIDTH SD: 49.7 FL (ref 36.4–46.3)
SODIUM SERPL-SCNC: 122 MMOL/L (ref 136–145)
WBC # BLD AUTO: 13.07 K/UL (ref 4.8–10.8)

## 2018-03-23 RX ADMIN — Medication SCH TAB: at 20:47

## 2018-03-23 RX ADMIN — ACETAMINOPHEN PRN MG: 325 TABLET ORAL at 20:49

## 2018-03-23 RX ADMIN — INSULIN ASPART SCH UNITS: 100 INJECTION, SOLUTION INTRAVENOUS; SUBCUTANEOUS at 17:20

## 2018-03-23 RX ADMIN — DILTIAZEM HYDROCHLORIDE SCH MG: 60 TABLET, FILM COATED ORAL at 16:30

## 2018-03-23 RX ADMIN — LEVOFLOXACIN SCH MG: 750 TABLET, FILM COATED ORAL at 10:24

## 2018-03-23 RX ADMIN — DOXYCYCLINE HYCLATE SCH MG: 100 CAPSULE, GELATIN COATED ORAL at 20:48

## 2018-03-23 RX ADMIN — DICLOFENAC SODIUM SCH APPLN: 10 GEL TOPICAL at 16:30

## 2018-03-23 RX ADMIN — PANTOPRAZOLE SCH MG: 40 TABLET, DELAYED RELEASE ORAL at 12:26

## 2018-03-23 RX ADMIN — PRAVASTATIN SODIUM SCH MG: 40 TABLET ORAL at 20:47

## 2018-03-23 RX ADMIN — DOXYCYCLINE HYCLATE SCH MG: 100 CAPSULE, GELATIN COATED ORAL at 08:47

## 2018-03-23 RX ADMIN — Medication SCH TAB: at 08:47

## 2018-03-23 RX ADMIN — RIVAROXABAN SCH MG: 15 TABLET, FILM COATED ORAL at 17:20

## 2018-03-23 RX ADMIN — POLYETHYLENE GLYCOL 3350 PRN GM: 17 POWDER, FOR SOLUTION ORAL at 12:35

## 2018-03-23 RX ADMIN — DILTIAZEM HYDROCHLORIDE SCH MG: 60 TABLET, FILM COATED ORAL at 20:48

## 2018-03-23 RX ADMIN — DILTIAZEM HYDROCHLORIDE SCH MG: 60 TABLET, FILM COATED ORAL at 03:38

## 2018-03-23 RX ADMIN — INSULIN ASPART SCH UNITS: 100 INJECTION, SOLUTION INTRAVENOUS; SUBCUTANEOUS at 12:30

## 2018-03-23 RX ADMIN — DICLOFENAC SODIUM SCH APPLN: 10 GEL TOPICAL at 12:30

## 2018-03-23 RX ADMIN — FUROSEMIDE SCH MG: 40 TABLET ORAL at 08:47

## 2018-03-23 RX ADMIN — INSULIN ASPART SCH UNITS: 100 INJECTION, SOLUTION INTRAVENOUS; SUBCUTANEOUS at 20:57

## 2018-03-23 RX ADMIN — DICLOFENAC SODIUM SCH APPLN: 10 GEL TOPICAL at 08:13

## 2018-03-23 RX ADMIN — DILTIAZEM HYDROCHLORIDE SCH MG: 60 TABLET, FILM COATED ORAL at 10:23

## 2018-03-23 RX ADMIN — INSULIN ASPART SCH UNITS: 100 INJECTION, SOLUTION INTRAVENOUS; SUBCUTANEOUS at 09:07

## 2018-03-23 RX ADMIN — ACETAMINOPHEN PRN MG: 325 TABLET ORAL at 08:46

## 2018-03-23 NOTE — DIAGNOSTIC IMAGING REPORT
CHEST ONE VIEW PORTABLE



HISTORY:      Short of breath.



COMPARISON: Chest 3/18/2018.



FINDINGS: The heart remains enlarged. Right midlung zone airspace opacity has

improved. Left midlung zone density remains unchanged. Small bilateral pleural

effusions and diffuse interstitial and vascular thickening persists. No

pneumothorax.



IMPRESSION:



1. Improved aeration within the right lung.

2. No change in the left midlung zone airspace opacity and diffuse interstitial

thickening.

3. Small bilateral pleural effusions persist.







Electronically signed by:  Praneeth Peters M.D.

3/23/2018 11:02 AM



Dictated Date/Time:  3/23/2018 10:59 AM

## 2018-03-23 NOTE — FAMILY MEDICINE PROGRESS NOTE
Progress Note


Date of Service


Mar 23, 2018.





Subjective


Pt evaluation today including:  conversation w/ patient, physical exam, chart 

review, lab review


Pain:  denies pain


PO Intake:  good


Voiding:  washburn catheter in place


complains of having some trouble with breathing this morning and also a little 

confused and not herself. denies chest pain





   Constitutional:  No fever, No chills


   Eyes:  No worsening of vision


   ENT:  No hearing loss


   Respiratory:  + shortness of breath, No cough, No sputum


   Cardiovascular:  No chest pain, No palpitations


   Abdomen:  No pain, No nausea, No vomiting, No diarrhea


   Musculoskeletal:  No joint pain


   Female :  No dysuria


   Neurologic:  No memory loss


   Psychiatric:  No depression symptoms


   Heme:  No abnormal bleeding/bruising


   Endo:  No fatigue





Medications





Current Inpatient Medications








 Medications


  (Trade)  Dose


 Ordered  Sig/Walter


 Route  Start Time


 Stop Time Status Last Admin


Dose Admin


 


 Al Hydrox/Mg


 Hydrox/Simethicone


  (Maalox Max Susp)  15 ml  Q4H  PRN


 PO  3/19/18 00:15


 4/18/18 00:14   


 


 


 Magnesium


 Hydroxide


  (Milk Of


 Magnesia Susp)  30 ml  Q12H  PRN


 PO  3/19/18 00:15


 4/18/18 00:14   


 


 


 Ondansetron HCl


  (Zofran Inj)  4 mg  Q6H  PRN


 IV  3/19/18 00:15


 4/18/18 00:14  3/22/18 11:07


4 MG


 


 Nitroglycerin


  (Nitrostat Tab)  0.4 mg  UD  PRN


 SL  3/19/18 00:15


 4/18/18 00:14   


 


 


 Polyethylene


  (Miralax Powder


 Packet)  17 gm  DAILY  PRN


 PO  3/19/18 00:15


 4/18/18 00:14  3/23/18 12:35


17 GM


 


 Acetaminophen/


 Butalbital/


 Caffeine


  (Fioricet Tab)  1 tab  DAILY  PRN


 PO  3/19/18 00:15


 4/18/18 00:14   


 


 


 Calcium/Vitamin D


  (Caltrate Plus


 Tab)  1 tab  BID


 PO  3/19/18 09:00


 4/18/18 08:59  3/23/18 08:47


1 TAB


 


 Clonazepam


  (Klonopin Tab)  0.5 mg  HS  PRN


 PO  3/19/18 00:15


 4/18/18 00:14  3/22/18 03:06


0.5 MG


 


 Multivitamins/


 Minerals


  (Multivitamin W/


 Minerals Tab)  1 tab  BID


 PO  3/19/18 09:00


 4/18/18 08:59  3/23/18 08:47


1 TAB


 


 Pantoprazole


 Sodium


  (Protonix Tab)  40 mg  QDL


 PO  3/19/18 11:00


 4/18/18 10:59  3/23/18 12:26


40 MG


 


 Pravastatin Sodium


  (Pravachol Tab)  80 mg  QPM


 PO  3/19/18 21:00


 4/18/18 20:59  3/22/18 21:53


80 MG


 


 Miscellaneous


  (Iv Fluids


 Completed)  1 ea  PRN  PRN


 N/A  3/19/18 03:30


 3/19/19 03:29   


 


 


 Diclofenac Sodium


  (Voltaren 1% Top


 Gel)  1 appln  3XDQ4


 EXT  3/19/18 16:00


 4/18/18 15:59  3/23/18 08:13


1 APPLN


 


 Glucagon


  (Glucagon Inj)  1 mg  UD  PRN


 SQ  3/19/18 19:15


 4/18/18 19:14   


 


 


 Glucose


  (Glucose 40% Gel)  15-30


 GRAMS 15


 GRAMS...  UD  PRN


 PO  3/19/18 19:15


 4/18/18 19:14   


 


 


 Glucose


  (Glucose Chew


 Tab)  4-8


 Tablets 4


 Tabl...  UD  PRN


 PO  3/19/18 19:15


 4/18/18 19:14   


 


 


 Insulin Aspart


  (novoLOG ASPART)  **SLIDING


 SCALE**


 **If C...  ACHS


 SC  3/20/18 07:00


 4/19/18 06:59  3/23/18 12:30


6 UNITS


 


 Diltiazem HCl


  (Cardizem Tab)  60 mg  Q6H


 PO  3/20/18 04:00


 4/19/18 03:59  3/23/18 10:23


60 MG


 


 Atenolol


  (Tenormin Tab)  25 mg  QPM


 PO  3/21/18 21:00


 4/18/18 20:59  3/22/18 21:57


25 MG


 


 Rivaroxaban


  (Xarelto Tab)  15 mg  QDD


 PO  3/21/18 16:45


 4/20/18 16:44  3/22/18 17:16


15 MG


 


 Prednisone


  (PredniSONE TAB)  60 mg  DAILY


 PO  3/22/18 09:00


 4/21/18 08:59  3/23/18 08:48


60 MG


 


 Levofloxacin


  (Levaquin Tab)  750 mg  Q2D@1100


 PO  3/23/18 11:00


 3/26/18 10:59  3/23/18 10:24


750 MG


 


 Doxycycline


 Hyclate


  (Vibramycin Cap)  100 mg  BID


 PO  3/22/18 21:00


 3/29/18 20:59  3/23/18 08:47


100 MG


 


 Furosemide


  (Lasix Tab)  40 mg  QAM


 PO  3/23/18 09:00


 4/22/18 08:59  3/23/18 08:47


40 MG


 


 Acetaminophen


  (Tylenol Tab)  650 mg  Q6H  PRN


 PO  3/22/18 10:45


 4/21/18 10:44  3/23/18 08:46


650 MG


 


 Levofloxacin


  (Consult)  1 ea  UD  PRN


 N/A  3/22/18 12:15


 4/21/18 12:14   


 











Objective


Vital Signs











  Date Time  Temp Pulse Resp B/P (MAP) Pulse Ox O2 Delivery O2 Flow Rate FiO2


 


3/23/18 09:35  86   96  8.0 


 


3/23/18 08:12 36.6 77 18 145/90 (108) 98 Nasal Cannula 2.0 


 


3/23/18 07:15     98 Nasal Cannula 3.0 


 


3/23/18 00:00      Nasal Cannula 3.0 


 


3/22/18 23:48 36.7 59 18 145/77 (99) 97 Nasal Cannula 3.0 


 


3/22/18 21:56  74  145/79 (101)    


 


3/22/18 16:00     91 Nasal Cannula 3.0 


 


3/22/18 15:23 36.5 78 20 136/73 (94) 91 Nasal Cannula 3.0 











Physical Exam


General Appearance:  WD/WN, + mild distress


Eyes:  normal inspection


ENT:  hearing grossly normal


Neck:  supple


Respiratory/Chest:  chest non-tender, lungs clear, + crackles, + rhonchi


Cardiovascular:  + irregularly irregular


Abdomen:  normal bowel sounds, non tender, soft


Extremities:  no pedal edema


Neurologic/Psychiatric:  alert, normal mood/affect, oriented x 3


Skin:  normal color





Laboratory Results


3/23/18 06:24








3/23/18 06:24

















Test


  3/23/18


06:24 3/23/18


11:47 3/23/18


14:10


 


Red Blood Count


  3.90 M/uL


(4.2-5.4) 


  


 


 


Mean Corpuscular Volume


  95.6 fL


() 


  


 


 


Mean Corpuscular Hemoglobin


  32.1 pg


(25-34) 


  


 


 


Mean Corpuscular Hemoglobin


Concent 33.5 g/dl


(32-36) 


  


 


 


RDW Standard Deviation


  49.7 fL


(36.4-46.3) 


  


 


 


RDW Coefficient of Variation


  14.4 %


(11.5-14.5) 


  


 


 


Mean Platelet Volume


  9.3 fL


(7.4-10.4) 


  


 


 


Nucleated RBC Absolute Count


(auto) 0.02 K/uL


(0-0) 


  


 


 


Nucleated Red Blood Cells % 0.1 %   


 


Anion Gap


  7.0 mmol/L


(3-11) 


  


 


 


Est Creatinine Clear Calc


Drug Dose 31.0 ml/min 


  


  


 


 


Estimated GFR (


American) 40.7 


  


  


 


 


Estimated GFR (Non-


American 35.1 


  


  


 


 


BUN/Creatinine Ratio 33.2 (10-20)   


 


Calcium Level


  8.4 mg/dl


(8.5-10.1) 


  


 


 


Bedside Glucose


  


  193 mg/dl


(70-90) 


 











Assessment and Plan


78-year-old female with a past medical history of rheumatoid arthritis with 

interstitial lung disease, hypertension, diabetes, depression, paroxysmal 

atrial fibrillation presented to the ER with complaints of progressively 

worsening shortness of breath over the last few days.  


developed some resp distress thsi morning and appeared more confused , ABG was 

ordered which revealed hypercapnia likely from resp fatigue.


CXR/IV lasix 20 mg were ordered and she was started on BIPAP again





Acute hypoxic respiratory failure: Pneumonia with worsening interstitial lung 

disease


-Chest CT  revealed interval worsening of peribronchovascular consolidation 

involving all 5 lobes suggestive of infection versus diffuse alveolar damage or 

hemorrhage  on a background of chronic lung disease with peripheral predominant 

consolidation and fibrotic change with an upper lobe predominance.


This could suggest underlying chronic eosinophilic pneumonia, organizing 

pneumonia, or connective tissue disease.


CXR 3/23: CXR: 1. Improved aeration within the right lung.


2. No change in the left midlung zone airspace opacity and diffuse interstitial


thickening.


3. Small bilateral pleural effusions persist.





- MRSA positive, influenza negative


- Switched to doxycycline and Levaquin


-IV Solu-Medrol switched to p.o. prednisone 60 mg, wean as tolerated, home dose 

is 10 mg daily


- switched back to BIPAP due to hypercapnia , repeat ABG pending








NSTEMI:


-Patient is allergic to aspirin


-Continue pravastatin


-Echo : . Normal left ventricular size and systolic function.  EF 60-65%.  No 

regional wall motion abnormalities.  Mild concentric left ventricular 

hypertrophy.


  *  2. The right ventricle is mildly dilated.


  *  3. The left atrium is severely dilated.


  *  4. The right atrium is moderately dilated.


  *  5. Aortic valve sclerosis mild, without significant aortic valvular 

stenosis.


  *  6. There is mild mitral regurgitation.


  *  7. There is severe tricuspid regurgitation.


  *  8. Top-normal right ventricular systolic pressure; 37mmHg.


  *  9. Technically difficult study, enhanced with IV Definity.


  *  10. Compared to prior study on 03/13/2012, atrial fibrillation has 

replaced sinus rhythm.  Tricuspid regurgitation now appears severe.





Atrial fibrillation with rapid ventricular rate:


-Has a history of paroxysmal atrial fib 


-Was started on Cardizem drip which was switched to p.o. Cardizem 90 mg every 6 

hours and atenolol 25 mg twice daily but patient had episodes of bradycardia 

and the regimen had been modified to cardizem 60 mg q6h with atenolol 25 mg 

qpm. Continue current regimen


- Continue xarelto qpm





Hypertension :


-Continue atenolol and diltiazem


 


Hypervolemia:


-Echo as above 


- Switched to PO lasix 40 mg.monitor I's and O's and daily weights








Steroid-induced hyperglycemia


-Monitor blood sugar





RA 


- Placed on steroids


- Continue Cimzia as outpt when recovers from PNM








Hyponatremia


-Sodium at 122-- improving


-Urine osmolality 294 and serum osmolality 277


-Monitor BMP





DVT prophylaxis: 


Heparin drip





DNR








Resident Physician Supervision Note:





I interviewed and examined the patient. Discussed with Dr. Fine and agree 

with findings and plan as documented in the note. Any exceptions or 

clarifications are listed here: None





Documented By:  Torres Woody


more somnolent earlier - ABG showed high pco2 - started bipap - feeling a 

little more alert but still working hard to breath


vitals noted fatigued appearing - no accessory muscles on bipap lungs actually 

more clear than before still scattered rales no rhonchi no wheeze


a/p


mixed respiratory failure - hypoxia and hypercapnea - from infection 

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


-now worse hypercapnea - labs/exam/CXR do not show new fluid or worse infection 

- ?all due to fatigue and poor baseline lung function


-continue current abx, bipap, supportive care, follow ABG adn clinical status, 

signed out to night team





afib - rate control reasonable.  continue current meds





hyponatremia -   likely mixed picture.  continue current care





otherwise as above





Resident Tracking


Resident Involvement:  Resident Care Provided


Care Provided:  Adult Hospital Medicine

## 2018-03-23 NOTE — CARDIOLOGY PROGRESS NOTE
DATE: 03/23/2018

 

TIME:  9:01 a.m.

 

SUBJECTIVE:  Ms. Solomon has been confused all night according to nursing

staff.  She has also been more somnolent.  She does not readily respond

verbally but does respond to verbal stimuli.  She denies pain.  She denies

shortness of breath.  She shakes her head yes when asked if she is tired. 

She often times would close her eyes during questioning.  She was able to

move all 4 extremities upon verbal command.

 

OBJECTIVE:

VITAL SIGNS:  Temperature 36.6 degrees, heart rate 77 beats per minute,

respiration rate 18, blood pressure 145/90 mmHg, oxygen saturation is 98% on

2 liters per nasal cannula.  I's and O's, negative 1.1 liters yesterday.

GENERAL:  She was in no acute distress but quite somnolent.  She did respond

to verbal stimuli.  She was not oriented.

NECK:  No JVD.

CARDIAC:  No ventricular heave, irregularly irregular, normal S1, S2.  There

were no audible murmurs, rubs or gallops.

LUNGS:  Bilateral crackles.

ABDOMEN:  Soft, nontender, nondistended.  Normoactive bowel sounds.

EXTREMITIES:  1+ bilateral lower extremity edema.  No cyanosis.

 

MEDICATIONS:  Include atenolol 25 mg daily, diltiazem 60 mg p.o. q. 6 hours

which was held at 3:38 but otherwise she has been receiving this for the last

2 days, doxycycline 100 mg p.o. b.i.d., Lasix 40 mg p.o. daily starting

today, levofloxacin 750 mg every 2 days, Protonix 40 mg daily, pravastatin 80

mg daily, prednisone 60 mg daily, Xarelto 15 mg daily.

 

LABORATORY DATA:  Sodium 122, potassium 4.6, BUN 47, creatinine 1.41, up from

1.3.  White blood cell count is 13.07, hemoglobin 12.5, platelets 177.

 

ASSESSMENT AND PLAN:

1.  Atrial fibrillation with rapid ventricular response:  Her rapid

ventricular response was most likely secondary to the fact that she was

hypoxic upon presentation with some respiratory distress.  She is on the same

doses of medication that she presented on and her heart rate is now well

controlled.  Can continue current doses and on discharge would use

long-acting diltiazem that she took as an outpatient.  Continue

anticoagulation for stroke risk reduction if there are no contraindications.

2.  Non-ST elevation myocardial infarction:  She did not present with acute

coronary syndrome and this is likely secondary to demand ischemia given her

hypoxia and atrial fibrillation with rapid ventricular response with

multifocal pneumonia.  Continue beta blocker and statin therapy.

3.  Tricuspid regurgitation:  Severe on echo.  Recommend conservative

management.

4.  Hypertension:  Blood pressure mildly elevated now but was better

controlled yesterday.  No changes made currently as her morning dose of

diltiazem was held and the fact that she is having mental status changes.

5.  Somnolence/hypercapnia:  Because of her mental status change, ABG was

recommended.  Nursing staff arranged for this to be done stat.  The

results are now available and demonstrate pH of 7.29, pCO2 of 83, pO2 of 148.

 Dr. Woody was notified and he states that he will manage this.  Consider

BiPAP.

6.  Disposition:  Cardiology will sign off at this time.  Please call with

any other questions or concerns.  The patient's care has been discussed with

Dr. Woody of the primary hospitalist service.

 

 

 

 

SHANIQUE

## 2018-03-24 VITALS
OXYGEN SATURATION: 92 % | DIASTOLIC BLOOD PRESSURE: 94 MMHG | TEMPERATURE: 97.88 F | HEART RATE: 78 BPM | SYSTOLIC BLOOD PRESSURE: 145 MMHG

## 2018-03-24 VITALS — HEART RATE: 72 BPM | OXYGEN SATURATION: 95 %

## 2018-03-24 VITALS
DIASTOLIC BLOOD PRESSURE: 84 MMHG | SYSTOLIC BLOOD PRESSURE: 125 MMHG | OXYGEN SATURATION: 95 % | TEMPERATURE: 97.88 F | HEART RATE: 80 BPM

## 2018-03-24 VITALS — HEART RATE: 67 BPM | OXYGEN SATURATION: 93 %

## 2018-03-24 VITALS
DIASTOLIC BLOOD PRESSURE: 85 MMHG | TEMPERATURE: 96.62 F | HEART RATE: 61 BPM | SYSTOLIC BLOOD PRESSURE: 133 MMHG | OXYGEN SATURATION: 95 %

## 2018-03-24 VITALS — OXYGEN SATURATION: 95 % | HEART RATE: 69 BPM

## 2018-03-24 VITALS — SYSTOLIC BLOOD PRESSURE: 145 MMHG | DIASTOLIC BLOOD PRESSURE: 89 MMHG | HEART RATE: 84 BPM

## 2018-03-24 VITALS — OXYGEN SATURATION: 90 %

## 2018-03-24 LAB
BUN SERPL-MCNC: 49 MG/DL (ref 7–18)
BUN SERPL-MCNC: 52 MG/DL (ref 7–18)
CALCIUM SERPL-MCNC: 8.1 MG/DL (ref 8.5–10.1)
CALCIUM SERPL-MCNC: 8.5 MG/DL (ref 8.5–10.1)
CO2 SERPL-SCNC: 39 MMOL/L (ref 21–32)
CO2 SERPL-SCNC: 44 MMOL/L (ref 21–32)
CREAT SERPL-MCNC: 1.55 MG/DL (ref 0.6–1.2)
CREAT SERPL-MCNC: 1.68 MG/DL (ref 0.6–1.2)
CREAT SERPL-MCNC: 1.85 MG/DL (ref 0.6–1.2)
GLUCOSE SERPL-MCNC: 148 MG/DL (ref 70–99)
GLUCOSE SERPL-MCNC: 184 MG/DL (ref 70–99)
POTASSIUM SERPL-SCNC: 4.7 MMOL/L (ref 3.5–5.1)
POTASSIUM SERPL-SCNC: 4.8 MMOL/L (ref 3.5–5.1)
SODIUM SERPL-SCNC: 117 MMOL/L (ref 136–145)
SODIUM SERPL-SCNC: 119 MMOL/L (ref 136–145)

## 2018-03-24 RX ADMIN — DICLOFENAC SODIUM SCH APPLN: 10 GEL TOPICAL at 15:30

## 2018-03-24 RX ADMIN — CEFEPIME SCH MLS/MIN: 2 INJECTION, POWDER, FOR SOLUTION INTRAVENOUS at 20:37

## 2018-03-24 RX ADMIN — DILTIAZEM HYDROCHLORIDE SCH MG: 60 TABLET, FILM COATED ORAL at 16:06

## 2018-03-24 RX ADMIN — Medication SCH TAB: at 20:40

## 2018-03-24 RX ADMIN — PANTOPRAZOLE SCH MG: 40 TABLET, DELAYED RELEASE ORAL at 12:46

## 2018-03-24 RX ADMIN — Medication SCH TAB: at 09:26

## 2018-03-24 RX ADMIN — INSULIN ASPART SCH UNITS: 100 INJECTION, SOLUTION INTRAVENOUS; SUBCUTANEOUS at 12:51

## 2018-03-24 RX ADMIN — INSULIN ASPART SCH UNITS: 100 INJECTION, SOLUTION INTRAVENOUS; SUBCUTANEOUS at 17:42

## 2018-03-24 RX ADMIN — SODIUM CHLORIDE TAB 1 GM SCH GM: 1 TAB at 17:38

## 2018-03-24 RX ADMIN — DOXYCYCLINE HYCLATE SCH MG: 100 CAPSULE, GELATIN COATED ORAL at 09:25

## 2018-03-24 RX ADMIN — FUROSEMIDE SCH MG: 40 TABLET ORAL at 09:26

## 2018-03-24 RX ADMIN — INSULIN ASPART SCH UNITS: 100 INJECTION, SOLUTION INTRAVENOUS; SUBCUTANEOUS at 09:20

## 2018-03-24 RX ADMIN — INSULIN ASPART SCH UNITS: 100 INJECTION, SOLUTION INTRAVENOUS; SUBCUTANEOUS at 20:37

## 2018-03-24 RX ADMIN — DICLOFENAC SODIUM SCH APPLN: 10 GEL TOPICAL at 09:22

## 2018-03-24 RX ADMIN — DICLOFENAC SODIUM SCH APPLN: 10 GEL TOPICAL at 12:46

## 2018-03-24 RX ADMIN — POLYETHYLENE GLYCOL 3350 PRN GM: 17 POWDER, FOR SOLUTION ORAL at 15:29

## 2018-03-24 RX ADMIN — PRAVASTATIN SODIUM SCH MG: 40 TABLET ORAL at 20:41

## 2018-03-24 RX ADMIN — DILTIAZEM HYDROCHLORIDE SCH MG: 60 TABLET, FILM COATED ORAL at 15:30

## 2018-03-24 RX ADMIN — RIVAROXABAN SCH MG: 15 TABLET, FILM COATED ORAL at 17:38

## 2018-03-24 RX ADMIN — DILTIAZEM HYDROCHLORIDE SCH MG: 60 TABLET, FILM COATED ORAL at 09:27

## 2018-03-24 RX ADMIN — SODIUM CHLORIDE TAB 1 GM SCH GM: 1 TAB at 20:41

## 2018-03-24 RX ADMIN — DILTIAZEM HYDROCHLORIDE SCH MG: 60 TABLET, FILM COATED ORAL at 04:09

## 2018-03-24 RX ADMIN — DOXYCYCLINE HYCLATE SCH MG: 100 CAPSULE, GELATIN COATED ORAL at 20:40

## 2018-03-24 RX ADMIN — Medication SCH TAB: at 20:41

## 2018-03-24 RX ADMIN — DILTIAZEM HYDROCHLORIDE SCH MG: 60 TABLET, FILM COATED ORAL at 21:57

## 2018-03-24 NOTE — FAMILY MEDICINE PROGRESS NOTE
Progress Note


Date of Service


Mar 24, 2018.





Subjective


Pt evaluation today including:  conversation w/ patient, chart review, lab 

review, review of studies


has Bipap on, minimal conversation


Feels unwell, feels short of breath





   Constitutional:  No fever, No chills


   Respiratory:  + shortness of breath, + dyspnea on exertion, No cough, No 

sputum, No wheezing


   Cardiovascular:  No chest pain


   Abdomen:  No pain, No nausea, No vomiting





Medications











 Medications


  (Trade)  Dose


 Ordered  Sig/Walter


 Route  Start Time


 Stop Time Status Last Admin


Dose Admin


 


 Sodium Chloride


  (Sodium Chloride


 Tab)  1 gm  1530  ONCE


 PO  3/24/18 15:30


 3/24/18 15:31 DC 3/24/18 15:59


1 GM











Objective


Vital Signs





Vital Signs Past 12 Hours








  Date Time  Temp Pulse Resp B/P (MAP) Pulse Ox O2 Delivery O2 Flow Rate FiO2


 


3/24/18 16:07 36.6 78 19 145/94 (111) 92 BiPAP 8.0 


 


3/24/18 14:21  69   95  8.0 


 


3/24/18 08:00     90 BiPAP 8.0 


 


3/24/18 07:34  72   95  8.0 


 


3/24/18 06:51 36.6 80 20 125/84 (98) 95 BiPAP 8.0 











Physical Exam


General Appearance:  no apparent distress


Eyes:  PERRL, EOMI


Respiratory/Chest:  no respiratory distress, no accessory muscle use, + 

decreased breath sounds, + rhonchi


Cardiovascular:  + irregularly irregular


Abdomen:  normal bowel sounds, non tender, soft


Extremities:  non-tender, no pedal edema


Neurologic/Psychiatric:  no motor/sensory deficits





Assessment and Plan


78-year-old female with a past medical history of rheumatoid arthritis with 

interstitial lung disease, hypertension, diabetes, depression, paroxysmal 

atrial fibrillation presented to the ER with complaints of progressively 

worsening shortness of breath over the last few days.  





Patient continues to have some shortness of breath, waxing and waning levels of 

mentation, ABG from yesterday reveals hypercapnia


Maintains on BiPap





Acute hypoxic/hypercapnic respiratory failure: Pneumonia with worsening 

interstitial lung disease


-Chest CT revealed interval worsening of peribronchovascular consolidation 


CXR 3/23: CXR: 


1. Improved aeration within the right lung.


2. No change in the left midlung zone airspace opacity and diffuse interstitial


thickening.





- MRSA positive, influenza negative


- Switched to doxycycline and Levaquin, re-added cefepime today due to slightly 

worse clinical picture this morning


- Steroids 60 mg PO- maintain current dose- no wheezing or indication of ILD 

flare up


- Maintain on BIPAP due to hypercapnia, repeat ABG shows improving hypercapnia





Hyponatremia/LON


-Sodium at 117 today- worse


- ?SIADH +/- mild heart failure


- Sodium tablets started


-Urine osmolality 294 and serum osmolality 277


- Cr- slightly worse today- encourage PO intake


-Monitor BMP








NSTEMI:


-Patient is allergic to aspirin


-Continue pravastatin


-Echo : .EF 60-65%.  No regional wall motion abnormalities.  Mild concentric LVH

, Severe LA dilation, severe TR, RVSP- 37mmHg


  


Atrial fibrillation with rapid ventricular rate:


- Has a history of paroxysmal atrial fib 


- Cardizem and Atenolol


- Continue Xarelto qpm





Hypertension :


-Continue atenolol and diltiazem


 


Hypervolemia:


- Echo as above 


- Switched to PO lasix 40 mg.monitor I's and O's and daily weights





Steroid-induced hyperglycemia


-Monitor blood sugar





RA 


- Placed on steroids


- Continue Cimzia as outpt when recovers from PNM





DVT prophylaxis: 


Rivaroxaban





DNR





Resident Physician Supervision Note:





I interviewed and examined the patient. Discussed with Dr. Knox and 

agree with findings and plan as documented in the note. Any exceptions or 

clarifications are listed here: None





Documented By:  Torres Bong


was doing about the same as yesterday per R3 when rounded on this AM - but now 

actually more alert and talkative.  son present, updated.  still having off and 

on hallucinations but otherwise lucid.


vitals noted nad breathing unlabored  on bipap lungs improving actually mostly 

clear but has scattered rales


a/p


mixed respiratory failure - hypoxia and hypercapnea - from infection 

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


-yesterday worsened - likely from respiratory fatigue, but while now stable on 

bipap, has needed bipap since yesterday unremitting - ?possibly related to 

infection since came after downgrading abx - will restart cefepime, but still 

most likely from respiratory fatigue - continue bipap and supportive care and 

follow





afib - rate control reasonable.  continue current meds





hyponatremia - worse. likely mixed picture.  fluid restrict and salt replace, 

follow closely.  does run chronically low - suspect is SiADH (acute on chronic) 

with baseline low solute state





otherwise as above

## 2018-03-25 VITALS
DIASTOLIC BLOOD PRESSURE: 81 MMHG | TEMPERATURE: 96.44 F | OXYGEN SATURATION: 95 % | SYSTOLIC BLOOD PRESSURE: 136 MMHG | HEART RATE: 64 BPM

## 2018-03-25 VITALS
DIASTOLIC BLOOD PRESSURE: 84 MMHG | SYSTOLIC BLOOD PRESSURE: 144 MMHG | TEMPERATURE: 97.52 F | OXYGEN SATURATION: 99 % | HEART RATE: 77 BPM

## 2018-03-25 VITALS
TEMPERATURE: 96.08 F | HEART RATE: 73 BPM | OXYGEN SATURATION: 98 % | SYSTOLIC BLOOD PRESSURE: 145 MMHG | DIASTOLIC BLOOD PRESSURE: 92 MMHG

## 2018-03-25 VITALS — HEART RATE: 68 BPM | OXYGEN SATURATION: 97 % | SYSTOLIC BLOOD PRESSURE: 149 MMHG | DIASTOLIC BLOOD PRESSURE: 89 MMHG

## 2018-03-25 VITALS — HEART RATE: 64 BPM | OXYGEN SATURATION: 98 %

## 2018-03-25 VITALS — OXYGEN SATURATION: 96 %

## 2018-03-25 VITALS — OXYGEN SATURATION: 90 %

## 2018-03-25 VITALS — HEART RATE: 58 BPM | OXYGEN SATURATION: 100 %

## 2018-03-25 LAB
BASOPHILS # BLD: 0.01 K/UL (ref 0–0.2)
BASOPHILS NFR BLD: 0.1 %
BUN SERPL-MCNC: 49 MG/DL (ref 7–18)
BUN SERPL-MCNC: 49 MG/DL (ref 7–18)
CALCIUM SERPL-MCNC: 8.1 MG/DL (ref 8.5–10.1)
CALCIUM SERPL-MCNC: 8.5 MG/DL (ref 8.5–10.1)
CO2 SERPL-SCNC: 41 MMOL/L (ref 21–32)
CO2 SERPL-SCNC: 43 MMOL/L (ref 21–32)
CREAT SERPL-MCNC: 1.54 MG/DL (ref 0.6–1.2)
CREAT SERPL-MCNC: 1.61 MG/DL (ref 0.6–1.2)
EOS ABS #: 0.01 K/UL (ref 0–0.5)
EOSINOPHIL NFR BLD AUTO: 228 K/UL (ref 130–400)
GLUCOSE SERPL-MCNC: 145 MG/DL (ref 70–99)
GLUCOSE SERPL-MCNC: 247 MG/DL (ref 70–99)
HCT VFR BLD CALC: 38.7 % (ref 37–47)
HGB BLD-MCNC: 13.3 G/DL (ref 12–16)
IG#: 0.14 K/UL (ref 0–0.02)
IMM GRANULOCYTES NFR BLD AUTO: 9.7 %
LYMPHOCYTES # BLD: 1.8 K/UL (ref 1.2–3.4)
MCH RBC QN AUTO: 32.5 PG (ref 25–34)
MCHC RBC AUTO-ENTMCNC: 34.4 G/DL (ref 32–36)
MCV RBC AUTO: 94.6 FL (ref 80–100)
MONO ABS #: 1.85 K/UL (ref 0.11–0.59)
MONOCYTES NFR BLD: 9.9 %
NEUT ABS #: 14.84 K/UL (ref 1.4–6.5)
NEUTROPHILS # BLD AUTO: 0.1 %
NEUTROPHILS NFR BLD AUTO: 79.4 %
PMV BLD AUTO: 9.4 FL (ref 7.4–10.4)
POTASSIUM SERPL-SCNC: 4.6 MMOL/L (ref 3.5–5.1)
POTASSIUM SERPL-SCNC: 5.1 MMOL/L (ref 3.5–5.1)
RED CELL DISTRIBUTION WIDTH CV: 14 % (ref 11.5–14.5)
RED CELL DISTRIBUTION WIDTH SD: 48.2 FL (ref 36.4–46.3)
SODIUM SERPL-SCNC: 119 MMOL/L (ref 136–145)
SODIUM SERPL-SCNC: 121 MMOL/L (ref 136–145)
WBC # BLD AUTO: 18.65 K/UL (ref 4.8–10.8)

## 2018-03-25 RX ADMIN — DILTIAZEM HYDROCHLORIDE SCH MG: 60 TABLET, FILM COATED ORAL at 21:19

## 2018-03-25 RX ADMIN — INSULIN ASPART SCH UNITS: 100 INJECTION, SOLUTION INTRAVENOUS; SUBCUTANEOUS at 17:16

## 2018-03-25 RX ADMIN — CEFEPIME SCH MLS/MIN: 2 INJECTION, POWDER, FOR SOLUTION INTRAVENOUS at 20:06

## 2018-03-25 RX ADMIN — DILTIAZEM HYDROCHLORIDE SCH MG: 60 TABLET, FILM COATED ORAL at 03:43

## 2018-03-25 RX ADMIN — INSULIN ASPART SCH UNITS: 100 INJECTION, SOLUTION INTRAVENOUS; SUBCUTANEOUS at 12:23

## 2018-03-25 RX ADMIN — PANTOPRAZOLE SCH MG: 40 TABLET, DELAYED RELEASE ORAL at 12:24

## 2018-03-25 RX ADMIN — Medication SCH TAB: at 07:59

## 2018-03-25 RX ADMIN — PRAVASTATIN SODIUM SCH MG: 40 TABLET ORAL at 20:10

## 2018-03-25 RX ADMIN — Medication SCH TAB: at 08:01

## 2018-03-25 RX ADMIN — SODIUM CHLORIDE TAB 1 GM SCH GM: 1 TAB at 17:13

## 2018-03-25 RX ADMIN — SODIUM CHLORIDE TAB 1 GM SCH GM: 1 TAB at 08:00

## 2018-03-25 RX ADMIN — INSULIN ASPART SCH UNITS: 100 INJECTION, SOLUTION INTRAVENOUS; SUBCUTANEOUS at 08:08

## 2018-03-25 RX ADMIN — INSULIN ASPART SCH UNITS: 100 INJECTION, SOLUTION INTRAVENOUS; SUBCUTANEOUS at 20:18

## 2018-03-25 RX ADMIN — DILTIAZEM HYDROCHLORIDE SCH MG: 60 TABLET, FILM COATED ORAL at 10:18

## 2018-03-25 RX ADMIN — DICLOFENAC SODIUM SCH APPLN: 10 GEL TOPICAL at 12:23

## 2018-03-25 RX ADMIN — ACETAMINOPHEN PRN MG: 325 TABLET ORAL at 17:13

## 2018-03-25 RX ADMIN — Medication SCH TAB: at 20:09

## 2018-03-25 RX ADMIN — DILTIAZEM HYDROCHLORIDE SCH MG: 60 TABLET, FILM COATED ORAL at 15:49

## 2018-03-25 RX ADMIN — Medication SCH TAB: at 20:07

## 2018-03-25 RX ADMIN — CEFEPIME SCH MLS/MIN: 2 INJECTION, POWDER, FOR SOLUTION INTRAVENOUS at 07:57

## 2018-03-25 RX ADMIN — SODIUM CHLORIDE TAB 1 GM SCH GM: 1 TAB at 12:24

## 2018-03-25 RX ADMIN — DOXYCYCLINE HYCLATE SCH MG: 100 CAPSULE, GELATIN COATED ORAL at 08:01

## 2018-03-25 RX ADMIN — SODIUM CHLORIDE TAB 1 GM SCH GM: 1 TAB at 20:08

## 2018-03-25 RX ADMIN — RIVAROXABAN SCH MG: 15 TABLET, FILM COATED ORAL at 07:59

## 2018-03-25 RX ADMIN — DICLOFENAC SODIUM SCH APPLN: 10 GEL TOPICAL at 07:56

## 2018-03-25 RX ADMIN — DICLOFENAC SODIUM SCH APPLN: 10 GEL TOPICAL at 15:49

## 2018-03-25 RX ADMIN — LEVOFLOXACIN SCH MG: 750 TABLET, FILM COATED ORAL at 10:19

## 2018-03-25 RX ADMIN — DOXYCYCLINE HYCLATE SCH MG: 100 CAPSULE, GELATIN COATED ORAL at 20:08

## 2018-03-25 NOTE — FAMILY MEDICINE PROGRESS NOTE
Progress Note


Date of Service


Mar 25, 2018.





Subjective


Pt evaluation today including:  conversation w/ patient


Feels better this morning


Wonders about her tremor- which she says is new





   Constitutional:  No fever, No chills


   Eyes:  No worsening of vision


   ENT:  No hearing loss


   Respiratory:  + shortness of breath, + dyspnea on exertion, No cough, No 

sputum, No wheezing


   Cardiovascular:  No chest pain


   Abdomen:  No pain, No nausea, No vomiting, No diarrhea, No constipation


   Female :  No dysuria, No urinary frequency





Medications











 Medications


  (Trade)  Dose


 Ordered  Sig/Walter


 Route  Start Time


 Stop Time Status Last Admin


Dose Admin


 


 Sodium Chloride


  (Sodium Chloride


 Tab)  1 gm  QID


 PO  3/24/18 17:00


 4/23/18 16:59  3/25/18 12:24


1 GM


 


 Sodium Chloride


  (Sodium Chloride


 Tab)  1 gm  1530  ONCE


 PO  3/24/18 15:30


 3/24/18 15:31 DC 3/24/18 15:59


1 GM


 


 Cefepime HCl 2000


 mg/Syringe  20 ml @ 5


 mls/min  Q12H


 IV  3/24/18 20:00


 3/31/18 19:59  3/25/18 07:57


5 MLS/MIN


 


 Sodium Chloride  250 ml @ 


 250 mls/hr  Q1H


 IV  3/25/18 06:45


 3/25/18 07:44 DC 3/25/18 07:56


250 MLS/HR











Objective


Physical Exam


General Appearance:  no apparent distress


Eyes:  PERRL, EOMI


ENT:  hearing grossly normal


Neck:  supple, no JVD


Respiratory/Chest:  no respiratory distress, no accessory muscle use, + crackles

, + rhonchi


Cardiovascular:  no edema, + irregularly irregular


Abdomen:  normal bowel sounds, non tender, soft


Extremities:  + pedal edema (trace)


Neurologic/Psychiatric:  no motor/sensory deficits, alert, normal mood/affect





Laboratory Results





Last 24 Hours








Test


  3/24/18


14:06 3/24/18


17:03 3/24/18


20:06 3/24/18


20:34


 


Arterial Blood pH 7.44    


 


Arterial Blood Partial


Pressure CO2 61 mmHg 


  


  


  


 


 


Arterial Blood Partial


Pressure O2 46 mm/Hg 


  


  


  


 


 


Arterial Blood HCO3 41 mmol/L    


 


Arterial Blood Oxygen


Saturation 83.5 % 


  


  


  


 


 


Arterial Blood Base Excess 14.1 mEq/L    


 


Arterial Blood Gas Delivery 8    


 


Lalit Test POS    


 


Bedside Glucose  221 mg/dl   194 mg/dl 


 


Sodium Level   119 mmol/L  


 


Potassium Level   4.7 mmol/L  


 


Chloride Level   74 mmol/L  


 


Carbon Dioxide Level   44 mmol/L  


 


Anion Gap   2.0 mmol/L  


 


Blood Urea Nitrogen   52 mg/dl  


 


Creatinine   1.85 mg/dl  


 


Est Creatinine Clear Calc


Drug Dose 


  


  23.6 ml/min 


  


 


 


Estimated GFR (


American) 


  


  29.3 


  


 


 


Estimated GFR (Non-


American 


  


  25.3 


  


 


 


BUN/Creatinine Ratio   28.3  


 


Random Glucose   184 mg/dl  


 


Calcium Level   8.1 mg/dl  


 


Test


  3/25/18


06:40 3/25/18


07:30 3/25/18


07:42 3/25/18


11:36


 


Bedside Glucose 148 mg/dl   161 mg/dl  215 mg/dl 


 


White Blood Count  18.65 K/uL   


 


Red Blood Count  4.09 M/uL   


 


Hemoglobin  13.3 g/dL   


 


Hematocrit  38.7 %   


 


Mean Corpuscular Volume  94.6 fL   


 


Mean Corpuscular Hemoglobin  32.5 pg   


 


Mean Corpuscular Hemoglobin


Concent 


  34.4 g/dl 


  


  


 


 


Platelet Count  228 K/uL   


 


Mean Platelet Volume  9.4 fL   


 


Neutrophils (%) (Auto)  79.4 %   


 


Lymphocytes (%) (Auto)  9.7 %   


 


Monocytes (%) (Auto)  9.9 %   


 


Eosinophils (%) (Auto)  0.1 %   


 


Basophils (%) (Auto)  0.1 %   


 


Neutrophils # (Auto)  14.84 K/uL   


 


Lymphocytes # (Auto)  1.80 K/uL   


 


Monocytes # (Auto)  1.85 K/uL   


 


Eosinophils # (Auto)  0.01 K/uL   


 


Basophils # (Auto)  0.01 K/uL   


 


RDW Standard Deviation  48.2 fL   


 


RDW Coefficient of Variation  14.0 %   


 


Immature Granulocyte % (Auto)  0.8 %   


 


Immature Granulocyte # (Auto)  0.14 K/uL   


 


Sodium Level  121 mmol/L   


 


Potassium Level  4.6 mmol/L   


 


Chloride Level  74 mmol/L   


 


Carbon Dioxide Level  43 mmol/L   


 


Anion Gap  4.0 mmol/L   


 


Blood Urea Nitrogen  49 mg/dl   


 


Creatinine  1.54 mg/dl   


 


Est Creatinine Clear Calc


Drug Dose 


  28.3 ml/min 


  


  


 


 


Estimated GFR (


American) 


  36.6 


  


  


 


 


Estimated GFR (Non-


American 


  31.5 


  


  


 


 


BUN/Creatinine Ratio  32.1   


 


Random Glucose  145 mg/dl   


 


Calcium Level  8.5 mg/dl   











Assessment and Plan


78-year-old female with a past medical history of rheumatoid arthritis with 

interstitial lung disease, hypertension, diabetes, depression, paroxysmal 

atrial fibrillation presented to the ER with complaints of progressively 

worsening shortness of breath over the last few days.  





Shortness of breath is improved this morning, Patient more alert/awake and 

oriented. 


BiPap at night





Acute hypoxic/hypercapnic respiratory failure: Pneumonia with worsening 

interstitial lung disease


- Chest CT revealed interval worsening of peribronchovascular consolidation 


- repeat chest X-ray


- MRSA positive, influenza negative


- Switched to doxycycline and Levaquin + Cefepime- for double coverage / 

pseudomonas due to worsening clinical picture, increasing wbc count


- Steroids 60 mg PO- maintain current dose due to overall slight worsening- no 

wheezing or indication of ILD flare up


- Maintain on BIPAP due to hypercapnia





Hyponatremia/LON


-Sodium improving at 121


- ?SIADH +/- mild heart failure


- Sodium tablets started


- Urine osmolality 294 and serum osmolality 277


- Cr improved- encourage PO intake, given 250 NSS bolus- would overall fluid 

restrict


- Monitor BMP





NSTEMI:


-Patient is allergic to aspirin


-Continue pravastatin


-Echo : .EF 60-65%.  No regional wall motion abnormalities.  Mild concentric LVH

, Severe LA dilation, severe TR, RVSP- 37mmHg


  


Atrial fibrillation with rapid ventricular rate:


- Has a history of paroxysmal atrial fib 


- Cardizem and Atenolol


- Continue Xarelto qpm





Hypertension :


-Continue atenolol and diltiazem


 


Hypervolemia:


- Echo as above 


- Po Lasix- held today yesterday due to worsening LON- restart tomorrow





Steroid-induced hyperglycemia


-Monitor blood sugar





RA 


- Placed on steroids


- Continue Cimzia as output when recovers from PNM





DVT prophylaxis: 


Rivaroxaban





DNR











Resident Physician Supervision Note:





Pt seen and discussed with Dr. Knox and agree with findings and plan 

as documented in the note. Any exceptions or clarifications are listed here: 

None





Documented By:  Torres Woody


sleeping comfortably nad


vitals noted nad breathing unlabored  on NC O2


a/p


mixed respiratory failure - hypoxia and hypercapnea - from infection 

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


-clinically ipmroving  - suspect WBC elevation is late rise corroborating that 

re-escalation of abx (with addition of cefepime) yesterday was right move.  

looking better again





afib - rate control reasonable.  continue rate control and anticoagulation





hyponatremia - improving again. likely mixed picture.  fluid restrict and salt 

replace, follow closely.  does run chronically low - suspect is SiADH (acute on 

chronic) with baseline low solute state





otherwise as above

## 2018-03-25 NOTE — DIAGNOSTIC IMAGING REPORT
CHEST ONE VIEW PORTABLE



CLINICAL HISTORY: 78 years-old Female presenting with dyspnea. 



TECHNIQUE: AP view of the chest was obtained.



COMPARISON: 3/23/2018.



FINDINGS:

The patient is DODD rotated. Atherosclerosis of aortic arch. Cardiac silhouette

enlarged. Mildly low lung volumes with hypoventilatory changes. No significant

change in patchy bilateral pulmonary opacities with a mid to basilar

predominance. Low lung volumes. Small bilateral pleural effusions suggested. No

large pneumothorax. Degenerative changes of the thoracic spine. Scoliotic

curvature of the spine and exaggerated thoracic kyphosis. Chronic deformity of

the left humeral head. Upper abdomen normal.



IMPRESSION:

1.  No significant change in the patchy diffuse bilateral pulmonary opacities

with a mid to basilar predominance. These could represent pulmonary edema,

multifocal pneumonia, or diffuse alveolar damage.



2.  Cardiomegaly.



3.  Suspected small pleural effusions.







Electronically signed by:  Mal Estraad M.D.

3/25/2018 2:23 PM



Dictated Date/Time:  3/25/2018 2:21 PM

## 2018-03-26 VITALS
DIASTOLIC BLOOD PRESSURE: 80 MMHG | HEART RATE: 65 BPM | SYSTOLIC BLOOD PRESSURE: 157 MMHG | TEMPERATURE: 96.08 F | OXYGEN SATURATION: 97 %

## 2018-03-26 VITALS
TEMPERATURE: 97.88 F | SYSTOLIC BLOOD PRESSURE: 150 MMHG | OXYGEN SATURATION: 98 % | HEART RATE: 70 BPM | DIASTOLIC BLOOD PRESSURE: 83 MMHG

## 2018-03-26 VITALS — HEART RATE: 76 BPM | OXYGEN SATURATION: 96 % | SYSTOLIC BLOOD PRESSURE: 157 MMHG | DIASTOLIC BLOOD PRESSURE: 97 MMHG

## 2018-03-26 VITALS — OXYGEN SATURATION: 95 % | HEART RATE: 66 BPM

## 2018-03-26 LAB
BASOPHILS # BLD: 0.01 K/UL (ref 0–0.2)
BASOPHILS NFR BLD: 0.1 %
BUN SERPL-MCNC: 42 MG/DL (ref 7–18)
BUN SERPL-MCNC: 44 MG/DL (ref 7–18)
CALCIUM SERPL-MCNC: 8 MG/DL (ref 8.5–10.1)
CALCIUM SERPL-MCNC: 8.4 MG/DL (ref 8.5–10.1)
CO2 SERPL-SCNC: 37 MMOL/L (ref 21–32)
CO2 SERPL-SCNC: 43 MMOL/L (ref 21–32)
CREAT SERPL-MCNC: 1.34 MG/DL (ref 0.6–1.2)
CREAT SERPL-MCNC: 1.39 MG/DL (ref 0.6–1.2)
EOS ABS #: 0.01 K/UL (ref 0–0.5)
EOSINOPHIL NFR BLD AUTO: 232 K/UL (ref 130–400)
GLUCOSE SERPL-MCNC: 145 MG/DL (ref 70–99)
GLUCOSE SERPL-MCNC: 185 MG/DL (ref 70–99)
HCT VFR BLD CALC: 39.9 % (ref 37–47)
HGB BLD-MCNC: 13.2 G/DL (ref 12–16)
IG#: 0.19 K/UL (ref 0–0.02)
IMM GRANULOCYTES NFR BLD AUTO: 8.9 %
LYMPHOCYTES # BLD: 1.66 K/UL (ref 1.2–3.4)
MCH RBC QN AUTO: 31.3 PG (ref 25–34)
MCHC RBC AUTO-ENTMCNC: 33.1 G/DL (ref 32–36)
MCV RBC AUTO: 94.5 FL (ref 80–100)
MONO ABS #: 1.89 K/UL (ref 0.11–0.59)
MONOCYTES NFR BLD: 10.1 %
NEUT ABS #: 14.94 K/UL (ref 1.4–6.5)
NEUTROPHILS # BLD AUTO: 0.1 %
NEUTROPHILS NFR BLD AUTO: 79.8 %
PMV BLD AUTO: 9.5 FL (ref 7.4–10.4)
POTASSIUM SERPL-SCNC: 4.7 MMOL/L (ref 3.5–5.1)
RED CELL DISTRIBUTION WIDTH CV: 13.9 % (ref 11.5–14.5)
RED CELL DISTRIBUTION WIDTH SD: 48.1 FL (ref 36.4–46.3)
SODIUM SERPL-SCNC: 119 MMOL/L (ref 136–145)
SODIUM SERPL-SCNC: 120 MMOL/L (ref 136–145)
SODIUM SERPL-SCNC: 121 MMOL/L (ref 136–145)
WBC # BLD AUTO: 18.7 K/UL (ref 4.8–10.8)

## 2018-03-26 RX ADMIN — PANTOPRAZOLE SCH MG: 40 TABLET, DELAYED RELEASE ORAL at 11:59

## 2018-03-26 RX ADMIN — DILTIAZEM HYDROCHLORIDE SCH MG: 60 TABLET, FILM COATED ORAL at 20:56

## 2018-03-26 RX ADMIN — Medication SCH TAB: at 07:49

## 2018-03-26 RX ADMIN — INSULIN ASPART SCH UNITS: 100 INJECTION, SOLUTION INTRAVENOUS; SUBCUTANEOUS at 21:00

## 2018-03-26 RX ADMIN — DILTIAZEM HYDROCHLORIDE SCH MG: 60 TABLET, FILM COATED ORAL at 16:40

## 2018-03-26 RX ADMIN — SODIUM CHLORIDE TAB 1 GM SCH GM: 1 TAB at 16:40

## 2018-03-26 RX ADMIN — FUROSEMIDE SCH MG: 40 TABLET ORAL at 11:36

## 2018-03-26 RX ADMIN — DOXYCYCLINE HYCLATE SCH MG: 100 CAPSULE, GELATIN COATED ORAL at 07:49

## 2018-03-26 RX ADMIN — INSULIN GLARGINE SCH UNITS: 100 INJECTION, SOLUTION SUBCUTANEOUS at 09:38

## 2018-03-26 RX ADMIN — DICLOFENAC SODIUM SCH APPLN: 10 GEL TOPICAL at 16:40

## 2018-03-26 RX ADMIN — SODIUM CHLORIDE TAB 1 GM SCH GM: 1 TAB at 12:00

## 2018-03-26 RX ADMIN — Medication SCH TAB: at 20:55

## 2018-03-26 RX ADMIN — CEFEPIME SCH MLS/MIN: 2 INJECTION, POWDER, FOR SOLUTION INTRAVENOUS at 20:55

## 2018-03-26 RX ADMIN — SODIUM CHLORIDE TAB 1 GM SCH GM: 1 TAB at 20:55

## 2018-03-26 RX ADMIN — DOXYCYCLINE HYCLATE SCH MG: 100 CAPSULE, GELATIN COATED ORAL at 20:56

## 2018-03-26 RX ADMIN — INSULIN ASPART SCH UNITS: 100 INJECTION, SOLUTION INTRAVENOUS; SUBCUTANEOUS at 17:02

## 2018-03-26 RX ADMIN — DICLOFENAC SODIUM SCH APPLN: 10 GEL TOPICAL at 07:49

## 2018-03-26 RX ADMIN — DILTIAZEM HYDROCHLORIDE SCH MG: 60 TABLET, FILM COATED ORAL at 03:35

## 2018-03-26 RX ADMIN — CEFEPIME SCH MLS/MIN: 2 INJECTION, POWDER, FOR SOLUTION INTRAVENOUS at 07:50

## 2018-03-26 RX ADMIN — DILTIAZEM HYDROCHLORIDE SCH MG: 60 TABLET, FILM COATED ORAL at 09:41

## 2018-03-26 RX ADMIN — RIVAROXABAN SCH MG: 15 TABLET, FILM COATED ORAL at 16:40

## 2018-03-26 RX ADMIN — DICLOFENAC SODIUM SCH APPLN: 10 GEL TOPICAL at 11:58

## 2018-03-26 RX ADMIN — SODIUM CHLORIDE TAB 1 GM SCH GM: 1 TAB at 07:49

## 2018-03-26 RX ADMIN — INSULIN ASPART SCH UNITS: 100 INJECTION, SOLUTION INTRAVENOUS; SUBCUTANEOUS at 08:05

## 2018-03-26 RX ADMIN — INSULIN ASPART SCH UNITS: 100 INJECTION, SOLUTION INTRAVENOUS; SUBCUTANEOUS at 12:02

## 2018-03-26 RX ADMIN — PRAVASTATIN SODIUM SCH MG: 40 TABLET ORAL at 20:55

## 2018-03-26 NOTE — NEPHROLOGY CONSULTATION
Nephrology Consultation


Date & Providers





Date of Consultation:  Mar 26, 2018.





Primary Care Provider:  Ezra Hinson M.D.





Referring Provider:





Reason for Consultation


Evaluation and management hyponatremia and acute kidney injury.





History of Present Illness


Greta Is a 78 with hypertension CHF chronic hyponatremia and chronic kidney 

disease admitted to the hospital worsening shortness of breath CHF 

exacerbation.  Nephrologic consult was requested to manage hyponatremia acute 

kidney injury.  Electronic medical records were reviewed in detail during 

patient's visit.





Greta was admitted to the hospital with worsening shortness of breath.  Chest x-

ray and CT chest with IV contrast on admission showed pulmonary infiltrate, 

pneumonia versus pulmonary edema.  She was started with empiric antibiotic and 

Lasix.  On admission her serum sodium was 124 which slightly improved over the 

course to 127 but dropped again. Urine osmolality was 290. Yesterday she was 

started off with IV normal saline with that her serum sodium dropped to 119. 

Lasix has been on hold.  She was started on oral salt tablet 1 g 4 times a day. 

She was not on any thiazide diuretics.  Record review showed she had 

hyponatremia before as well.  No history of adrenal insufficiency or 

hypothyroidism.  She is a nonsmoker, no personal history of malignancy.  

Hemoglobin stable. 





She also developed acute kidney injury with no prior history of chronic kidney 

disease, creatinine peaked to 1.6 which now improved to 1.3, baseline 

creatinine has been around 0.9.





Has history of hypertension, blood pressure has been running high, on 

amlodipine and lisinopril.  Has history of rheumatoid arthritis, on prednisone 

chronically.





Currently she denies any specific symptoms.





Allergies


Coded Allergies:  


     Amoxicillin (Verified  Allergy, Unknown, UNKNOWN, 3/18/18)


     Clavulanic Acid (Verified  Allergy, Unknown, UNKNOWN, 3/18/18)


     Clindamycin (Verified  Allergy, Unknown, UNKNOWN, 3/18/18)


     Penicillins (Verified  Allergy, Unknown, UNKNOWN, 3/18/18)


     Sulfa Antibiotics (Verified  Adverse Reaction, Intermediate, NAUSEA, 3/18/

18)


     Aspirin (Verified  Adverse Reaction, Mild, GI SYMPTOMS, 3/18/18)


Uncoded Allergies:  


     ARTIFICIAL SWEETNER (Allergy, Mild, TONGUE SORE, 5/9/13)





Inpatient Medications





Current Inpatient Medications








 Medications


  (Trade)  Dose


 Ordered  Sig/Walter


 Route  Start Time


 Stop Time Status Last Admin


Dose Admin


 


 Al Hydrox/Mg


 Hydrox/Simethicone


  (Maalox Max Susp)  15 ml  Q4H  PRN


 PO  3/19/18 00:15


 4/18/18 00:14   


 


 


 Magnesium


 Hydroxide


  (Milk Of


 Magnesia Susp)  30 ml  Q12H  PRN


 PO  3/19/18 00:15


 4/18/18 00:14  3/24/18 15:29


30 ML


 


 Ondansetron HCl


  (Zofran Inj)  4 mg  Q6H  PRN


 IV  3/19/18 00:15


 4/18/18 00:14  3/22/18 11:07


4 MG


 


 Nitroglycerin


  (Nitrostat Tab)  0.4 mg  UD  PRN


 SL  3/19/18 00:15


 4/18/18 00:14   


 


 


 Polyethylene


  (Miralax Powder


 Packet)  17 gm  DAILY  PRN


 PO  3/19/18 00:15


 4/18/18 00:14  3/24/18 15:29


17 GM


 


 Acetaminophen/


 Butalbital/


 Caffeine


  (Fioricet Tab)  1 tab  DAILY  PRN


 PO  3/19/18 00:15


 4/18/18 00:14   


 


 


 Calcium/Vitamin D


  (Caltrate Plus


 Tab)  1 tab  BID


 PO  3/19/18 09:00


 4/18/18 08:59  3/26/18 07:49


1 TAB


 


 Clonazepam


  (Klonopin Tab)  0.5 mg  HS  PRN


 PO  3/19/18 00:15


 4/18/18 00:14  3/22/18 03:06


0.5 MG


 


 Multivitamins/


 Minerals


  (Multivitamin W/


 Minerals Tab)  1 tab  BID


 PO  3/19/18 09:00


 4/18/18 08:59  3/26/18 07:49


1 TAB


 


 Pantoprazole


 Sodium


  (Protonix Tab)  40 mg  QDL


 PO  3/19/18 11:00


 4/18/18 10:59  3/25/18 12:24


40 MG


 


 Pravastatin Sodium


  (Pravachol Tab)  80 mg  QPM


 PO  3/19/18 21:00


 4/18/18 20:59  3/25/18 20:10


80 MG


 


 Miscellaneous


  (Iv Fluids


 Completed)  1 ea  PRN  PRN


 N/A  3/19/18 03:30


 3/19/19 03:29   


 


 


 Diclofenac Sodium


  (Voltaren 1% Top


 Gel)  1 appln  3XDQ4


 EXT  3/19/18 16:00


 4/18/18 15:59  3/26/18 07:49


1 APPLN


 


 Glucagon


  (Glucagon Inj)  1 mg  UD  PRN


 SQ  3/19/18 19:15


 4/18/18 19:14   


 


 


 Glucose


  (Glucose 40% Gel)  15-30


 GRAMS 15


 GRAMS...  UD  PRN


 PO  3/19/18 19:15


 4/18/18 19:14   


 


 


 Glucose


  (Glucose Chew


 Tab)  4-8


 Tablets 4


 Tabl...  UD  PRN


 PO  3/19/18 19:15


 4/18/18 19:14   


 


 


 Insulin Aspart


  (novoLOG ASPART)  **SLIDING


 SCALE**


 **If C...  ACHS


 SC  3/20/18 07:00


 4/19/18 06:59  3/26/18 08:05


6 UNITS


 


 Diltiazem HCl


  (Cardizem Tab)  60 mg  Q6H


 PO  3/20/18 04:00


 4/19/18 03:59  3/26/18 03:35


60 MG


 


 Atenolol


  (Tenormin Tab)  25 mg  QPM


 PO  3/21/18 21:00


 4/18/18 20:59  3/25/18 20:09


25 MG


 


 Rivaroxaban


  (Xarelto Tab)  15 mg  QDD


 PO  3/21/18 16:45


 4/20/18 16:44  3/25/18 07:59


15 MG


 


 Prednisone


  (PredniSONE TAB)  60 mg  DAILY


 PO  3/22/18 09:00


 4/21/18 08:59  3/26/18 07:49


60 MG


 


 Levofloxacin


  (Levaquin Tab)  750 mg  Q2D@1100


 PO  3/23/18 11:00


 3/26/18 10:59  3/25/18 10:19


750 MG


 


 Doxycycline


 Hyclate


  (Vibramycin Cap)  100 mg  BID


 PO  3/22/18 21:00


 3/29/18 20:59  3/26/18 07:49


100 MG


 


 Furosemide


  (Lasix Tab)  40 mg  QAM


 PO  3/23/18 09:00


 4/22/18 08:59 Future Hold 3/24/18 09:26


40 MG


 


 Acetaminophen


  (Tylenol Tab)  650 mg  Q6H  PRN


 PO  3/22/18 10:45


 4/21/18 10:44  3/25/18 17:13


650 MG


 


 Levofloxacin


  (Consult)  1 ea  UD  PRN


 N/A  3/22/18 12:15


 4/21/18 12:14   


 


 


 Sodium Chloride


  (Sodium Chloride


 Tab)  1 gm  QID


 PO  3/24/18 17:00


 4/23/18 16:59  3/26/18 07:49


1 GM


 


 Cefepime HCl 2000


 mg/Syringe  20 ml @ 5


 mls/min  Q12H


 IV  3/24/18 20:00


 3/31/18 19:59  3/26/18 07:50


5 MLS/MIN


 


 Insulin Glargine


  (Lantus Solostar


 Pen)  10 units  QAM


 SC  3/26/18 09:00


 4/25/18 08:59   


 











Family History





Cancer


Diabetes mellitus


Gallbladder disease


Heart disease


Lung disease





Social History


Smoking Status:  Former Smoker


Smokeless Tobacco Use:  No


Alcohol Use:  none


Drug Use:  none


Marital Status:  


Occupation:  retired





Review of Systems


A complete review of systems was performed.  Pertinent positives are noted 

above. All other systems are negative.





Physical Exam











  Date Time  Temp Pulse Resp B/P (MAP) Pulse Ox O2 Delivery O2 Flow Rate FiO2


 


3/26/18 07:43 35.6 65 20 157/80 (105) 97 BiPAP  


 


3/26/18 03:34  76 18 157/97 (117) 96 BiPAP 5.0 


 


3/26/18 00:00      BiPAP 5.0 


 


3/25/18 22:55 35.6 73 18 145/92 (109) 98 BiPAP  


 


3/25/18 22:46  64   98  5.0 


 


3/25/18 20:00      Nasal Cannula 2.0 


 


3/25/18 16:26 36.4 77 17 144/84 (104) 99 Nasal Cannula 2.0 


 


3/25/18 16:00     96 Nasal Cannula 2.0 





      BiPAP  











GENERAL: Elderly female,  AAA x 3,  pleasant, ill-appearing, not in any 

distress.


HEENT: Atraumatic, normocephalic.


NECK: Supple, no JVD, no carotid bruit appreciated. 


ENT: No sinus tenderness


MOUTH and THROAT: Moist oral mucosa, no oral ulcer or pharyngeal erythema


RESPIRATORY: Normal breathing efforts,clear to auscultation bilaterally, no 

wheezes or rales.


CARDIOVASCULAR: S1, S2 normal, rate rhythm regular.


ABDOMEN: Soft, nontender, positive bowel sound.


MUSCULOSKELETAL: No joint swelling, erythema or tenderness. Normal range of 

motion.


SKIN: No skin rash


EXTREMITY: No lower extremity edema


NEURO: No gross focal neurological deficit, speech fluent.


PSYCHIATRY: Normal mood and judgment





Laboratory Results





Last 24 Hours








Test


  3/25/18


11:36 3/25/18


16:46 3/25/18


17:37 3/25/18


20:09


 


Bedside Glucose 215 mg/dl  215 mg/dl   278 mg/dl 


 


Sodium Level   119 mmol/L  


 


Potassium Level   5.1 mmol/L  


 


Chloride Level   77 mmol/L  


 


Carbon Dioxide Level   41 mmol/L  


 


Anion Gap   1.0 mmol/L  


 


Blood Urea Nitrogen   49 mg/dl  


 


Creatinine   1.61 mg/dl  


 


Est Creatinine Clear Calc


Drug Dose 


  


  27.1 ml/min 


  


 


 


Estimated GFR (


American) 


  


  34.6 


  


 


 


Estimated GFR (Non-


American 


  


  29.9 


  


 


 


BUN/Creatinine Ratio   30.3  


 


Random Glucose   247 mg/dl  


 


Calcium Level   8.1 mg/dl  


 


Test


  3/26/18


06:52 3/26/18


07:27 3/26/18


07:52 


 


 


White Blood Count 18.70 K/uL    


 


Red Blood Count 4.22 M/uL    


 


Hemoglobin 13.2 g/dL    


 


Hematocrit 39.9 %    


 


Mean Corpuscular Volume 94.5 fL    


 


Mean Corpuscular Hemoglobin 31.3 pg    


 


Mean Corpuscular Hemoglobin


Concent 33.1 g/dl 


  


  


  


 


 


Platelet Count 232 K/uL    


 


Mean Platelet Volume 9.5 fL    


 


Neutrophils (%) (Auto) 79.8 %    


 


Lymphocytes (%) (Auto) 8.9 %    


 


Monocytes (%) (Auto) 10.1 %    


 


Eosinophils (%) (Auto) 0.1 %    


 


Basophils (%) (Auto) 0.1 %    


 


Neutrophils # (Auto) 14.94 K/uL    


 


Lymphocytes # (Auto) 1.66 K/uL    


 


Monocytes # (Auto) 1.89 K/uL    


 


Eosinophils # (Auto) 0.01 K/uL    


 


Basophils # (Auto) 0.01 K/uL    


 


RDW Standard Deviation 48.1 fL    


 


RDW Coefficient of Variation 13.9 %    


 


Immature Granulocyte % (Auto) 1.0 %    


 


Immature Granulocyte # (Auto) 0.19 K/uL    


 


Sodium Level 121 mmol/L    


 


Potassium Level 4.7 mmol/L    


 


Chloride Level 78 mmol/L    


 


Carbon Dioxide Level 43 mmol/L    


 


Anion Gap 0.0 mmol/L    


 


Blood Urea Nitrogen 42 mg/dl    


 


Creatinine 1.34 mg/dl    


 


Est Creatinine Clear Calc


Drug Dose 32.6 ml/min 


  


  


  


 


 


Estimated GFR (


American) 43.3 


  


  


  


 


 


Estimated GFR (Non-


American 37.3 


  


  


  


 


 


BUN/Creatinine Ratio 31.6    


 


Random Glucose 145 mg/dl    


 


Calcium Level 8.4 mg/dl    


 


Bedside Glucose  144 mg/dl   











Impression





(1) Hyponatremia


(2) Acute kidney injury


(3) Acute on chronic respiratory failure with hypoxia and hypercapnia


(4) Hypertension


78-year-old female with acute hyponatremia with chronic history of hyponatremia

, in the setting of acute on chronic respiratory failure, pneumonia versus 

pulmonary congestion.  Serum sodium dropped with normal saline, slightly 

improved to 121 this morning after normal saline was discontinued. 


Hyponatremia could be secondary to SIADH in the setting of respiratory illness, 

other possibilities such as adrenal insufficiency considering chronic steroid 

therapy remains however blood pressure has been elevated.  No TSH available.  

No personal history of malignancy.





No history of chronic kidney disease on admission creatinine was 0.9, developed 

acute kidney injury over the course of hospital stay, creatinine peaked to 1.6, 

most likely Hemodynamically mediated,  which currently seems to be improving 

last creatinine was 1.3.  Has been voiding normally, no urinalysis available.





Recommendations


--Start on Lasix 40 p.o. daily


--continue on salt tablet, fluid restriction to less than 1200 mL per day


--liberalize salt in diet


--check TSH, random cortisol


--Check UA


--serum sodium twice daily


--renal panel in a.m.





Will follow





Thank you for allowing me to participate in your patient's care. It was a 

pleasure to see Greta

## 2018-03-26 NOTE — FAMILY MEDICINE PROGRESS NOTE
Progress Note


Date of Service


Mar 26, 2018.





Subjective


Pt evaluation today including:  conversation w/ patient, physical exam, chart 

review, lab review, review of studies, review of inpatient medication list


Pain:  denies


PO Intake:  adequate


Voiding:  washburn catheter in place


Overnight, patient desaturated however it coincided with patient having removed 

BIPAP mask. Subsequently with replacement of mask, oxygen saturation returned 

to normal. Pt reports cough and SOB improved.





   Constitutional:  No fever, No chills


   Respiratory:  + shortness of breath, No cough, No wheezing


   Cardiovascular:  No chest pain, No edema, No palpitations


   Abdomen:  No pain, No nausea, No vomiting, No diarrhea, No constipation


   Musculoskeletal:  No swelling, No calf pain


   Female :  No dysuria


   Skin:  No rash, No itch





Medications





Current Inpatient Medications








 Medications


  (Trade)  Dose


 Ordered  Sig/Walter


 Route  Start Time


 Stop Time Status Last Admin


Dose Admin


 


 Al Hydrox/Mg


 Hydrox/Simethicone


  (Maalox Max Susp)  15 ml  Q4H  PRN


 PO  3/19/18 00:15


 4/18/18 00:14   


 


 


 Magnesium


 Hydroxide


  (Milk Of


 Magnesia Susp)  30 ml  Q12H  PRN


 PO  3/19/18 00:15


 4/18/18 00:14  3/24/18 15:29


30 ML


 


 Ondansetron HCl


  (Zofran Inj)  4 mg  Q6H  PRN


 IV  3/19/18 00:15


 4/18/18 00:14  3/22/18 11:07


4 MG


 


 Nitroglycerin


  (Nitrostat Tab)  0.4 mg  UD  PRN


 SL  3/19/18 00:15


 4/18/18 00:14   


 


 


 Polyethylene


  (Miralax Powder


 Packet)  17 gm  DAILY  PRN


 PO  3/19/18 00:15


 4/18/18 00:14  3/24/18 15:29


17 GM


 


 Acetaminophen/


 Butalbital/


 Caffeine


  (Fioricet Tab)  1 tab  DAILY  PRN


 PO  3/19/18 00:15


 4/18/18 00:14   


 


 


 Calcium/Vitamin D


  (Caltrate Plus


 Tab)  1 tab  BID


 PO  3/19/18 09:00


 4/18/18 08:59  3/26/18 20:55


1 TAB


 


 Clonazepam


  (Klonopin Tab)  0.5 mg  HS  PRN


 PO  3/19/18 00:15


 4/18/18 00:14  3/22/18 03:06


0.5 MG


 


 Multivitamins/


 Minerals


  (Multivitamin W/


 Minerals Tab)  1 tab  BID


 PO  3/19/18 09:00


 4/18/18 08:59  3/26/18 20:55


1 TAB


 


 Pantoprazole


 Sodium


  (Protonix Tab)  40 mg  QDL


 PO  3/19/18 11:00


 4/18/18 10:59  3/26/18 11:59


40 MG


 


 Pravastatin Sodium


  (Pravachol Tab)  80 mg  QPM


 PO  3/19/18 21:00


 4/18/18 20:59  3/26/18 20:55


80 MG


 


 Miscellaneous


  (Iv Fluids


 Completed)  1 ea  PRN  PRN


 N/A  3/19/18 03:30


 3/19/19 03:29   


 


 


 Diclofenac Sodium


  (Voltaren 1% Top


 Gel)  1 appln  3XDQ4


 EXT  3/19/18 16:00


 4/18/18 15:59  3/26/18 16:40


1 APPLN


 


 Glucagon


  (Glucagon Inj)  1 mg  UD  PRN


 SQ  3/19/18 19:15


 4/18/18 19:14   


 


 


 Glucose


  (Glucose 40% Gel)  15-30


 GRAMS 15


 GRAMS...  UD  PRN


 PO  3/19/18 19:15


 4/18/18 19:14   


 


 


 Glucose


  (Glucose Chew


 Tab)  4-8


 Tablets 4


 Tabl...  UD  PRN


 PO  3/19/18 19:15


 4/18/18 19:14   


 


 


 Insulin Aspart


  (novoLOG ASPART)  **SLIDING


 SCALE**


 **If C...  ACHS


 SC  3/20/18 07:00


 4/19/18 06:59  3/26/18 21:00


1 UNITS


 


 Diltiazem HCl


  (Cardizem Tab)  60 mg  Q6H


 PO  3/20/18 04:00


 4/19/18 03:59  3/26/18 20:56


60 MG


 


 Atenolol


  (Tenormin Tab)  25 mg  QPM


 PO  3/21/18 21:00


 4/18/18 20:59  3/26/18 20:56


25 MG


 


 Rivaroxaban


  (Xarelto Tab)  15 mg  QDD


 PO  3/21/18 16:45


 4/20/18 16:44  3/26/18 16:40


15 MG


 


 Prednisone


  (PredniSONE TAB)  60 mg  DAILY


 PO  3/22/18 09:00


 4/21/18 08:59  3/26/18 07:49


60 MG


 


 Doxycycline


 Hyclate


  (Vibramycin Cap)  100 mg  BID


 PO  3/22/18 21:00


 3/29/18 20:59  3/26/18 20:56


100 MG


 


 Acetaminophen


  (Tylenol Tab)  650 mg  Q6H  PRN


 PO  3/22/18 10:45


 4/21/18 10:44  3/25/18 17:13


650 MG


 


 Levofloxacin


  (Consult)  1 ea  UD  PRN


 N/A  3/22/18 12:15


 4/21/18 12:14   


 


 


 Sodium Chloride


  (Sodium Chloride


 Tab)  1 gm  QID


 PO  3/24/18 17:00


 4/23/18 16:59  3/26/18 20:55


1 GM


 


 Cefepime HCl 2000


 mg/Syringe  20 ml @ 5


 mls/min  Q12H


 IV  3/24/18 20:00


 3/31/18 19:59  3/26/18 20:55


5 MLS/MIN


 


 Insulin Glargine


  (Lantus Solostar


 Pen)  10 units  QAM


 SC  3/26/18 09:00


 4/25/18 08:59  3/26/18 09:38


10 UNITS


 


 Furosemide


  (Lasix Tab)  40 mg  QAM


 PO  3/26/18 10:30


 4/25/18 10:29  3/26/18 11:36


40 MG


 


 Levofloxacin


  (Levaquin Tab)  750 mg  Q2D@1100


 PO  3/27/18 11:00


 4/3/18 10:59   


 











Objective


Vital Signs











  Date Time  Temp Pulse Resp B/P (MAP) Pulse Ox O2 Delivery O2 Flow Rate FiO2


 


3/26/18 07:43 35.6 65 20 157/80 (105) 97 BiPAP  


 


3/26/18 03:34  76 18 157/97 (117) 96 BiPAP 5.0 


 


3/26/18 00:00      BiPAP 5.0 


 


3/25/18 22:55 35.6 73 18 145/92 (109) 98 BiPAP  


 


3/25/18 22:46  64   98  5.0 


 


3/25/18 20:00      Nasal Cannula 2.0 


 


3/25/18 16:26 36.4 77 17 144/84 (104) 99 Nasal Cannula 2.0 


 


3/25/18 16:00     96 Nasal Cannula 2.0 





      BiPAP  











Physical Exam


Notes:


GENERAL: alert,  no distress 


EYE EXAM: normal conjunctiva, PERRL and EOM's grossly intact


OROPHARYNX: no exudate, no erythema, lips, buccal mucosa, and tongue normal and 

mucous membranes are moist


NECK: supple, no nuchal rigidity, no adenopathy, non-tender


LUNGS: +Rhonchi bilaterally L>R. Normal chest wall mechanics


HEART: S1 normal and S2 normal 


ABDOMEN: abdomen soft, non-tender, normo-active bowel sounds, no masses, no 

rebound or guarding. 


SKIN: no rashes and no bruising


UPPER EXTREMITIES: upper extremities are grossly normal. 


LOWER EXTREMITIES: No pitting edema.


NEURO EXAM: Normal sensorium, cranial nerves II-XII grossly intact, normal 

speech





Laboratory Results





Results Past 24 Hours








Test


  3/25/18


11:36 3/25/18


16:46 3/25/18


17:37 3/25/18


20:09 Range/Units


 


 


Bedside Glucose 215 215  278 70-90  mg/dl


 


Sodium Level   119  136-145  mmol/L


 


Potassium Level   5.1  3.5-5.1  mmol/L


 


Chloride Level   77    mmol/L


 


Carbon Dioxide Level   41  21-32  mmol/L


 


Anion Gap   1.0  3-11  mmol/L


 


Blood Urea Nitrogen   49  7-18  mg/dl


 


Creatinine


  


  


  1.61


  


  0.60-1.20


mg/dl


 


Est Creatinine Clear Calc


Drug Dose 


  


  27.1


  


   ml/min


 


 


Estimated GFR (


American) 


  


  34.6


  


   


 


 


Estimated GFR (Non-


American 


  


  29.9


  


   


 


 


BUN/Creatinine Ratio   30.3  10-20  


 


Random Glucose   247  70-99  mg/dl


 


Calcium Level   8.1  8.5-10.1  mg/dl


 


Test


  3/26/18


06:52 3/26/18


07:27 3/26/18


07:52 


  Range/Units


 


 


White Blood Count 18.70    4.8-10.8  K/uL


 


Red Blood Count 4.22    4.2-5.4  M/uL


 


Hemoglobin 13.2    12.0-16.0  g/dL


 


Hematocrit 39.9    37-47  %


 


Mean Corpuscular Volume 94.5      fL


 


Mean Corpuscular Hemoglobin 31.3    25-34  pg


 


Mean Corpuscular Hemoglobin


Concent 33.1


  


  


  


  32-36  g/dl


 


 


Platelet Count 232    130-400  K/uL


 


Mean Platelet Volume 9.5    7.4-10.4  fL


 


Neutrophils (%) (Auto) 79.8     %


 


Lymphocytes (%) (Auto) 8.9     %


 


Monocytes (%) (Auto) 10.1     %


 


Eosinophils (%) (Auto) 0.1     %


 


Basophils (%) (Auto) 0.1     %


 


Neutrophils # (Auto) 14.94    1.4-6.5  K/uL


 


Lymphocytes # (Auto) 1.66    1.2-3.4  K/uL


 


Monocytes # (Auto) 1.89    0.11-0.59  K/uL


 


Eosinophils # (Auto) 0.01    0-0.5  K/uL


 


Basophils # (Auto) 0.01    0-0.2  K/uL


 


RDW Standard Deviation 48.1    36.4-46.3  fL


 


RDW Coefficient of Variation 13.9    11.5-14.5  %


 


Immature Granulocyte % (Auto) 1.0     %


 


Immature Granulocyte # (Auto) 0.19    0.00-0.02  K/uL


 


Sodium Level 121    136-145  mmol/L


 


Potassium Level 4.7    3.5-5.1  mmol/L


 


Chloride Level 78      mmol/L


 


Carbon Dioxide Level 43    21-32  mmol/L


 


Anion Gap 0.0    3-11  mmol/L


 


Blood Urea Nitrogen 42    7-18  mg/dl


 


Creatinine


  1.34


  


  


  


  0.60-1.20


mg/dl


 


Est Creatinine Clear Calc


Drug Dose 32.6


  


  


  


   ml/min


 


 


Estimated GFR (


American) 43.3


  


  


  


   


 


 


Estimated GFR (Non-


American 37.3


  


  


  


   


 


 


BUN/Creatinine Ratio 31.6    10-20  


 


Random Glucose 145    70-99  mg/dl


 


Calcium Level 8.4    8.5-10.1  mg/dl


 


Bedside Glucose  144   70-90  mg/dl











Assessment and Plan


77yo F w/ history of  rheumatoid arthritis with interstitial lung disease, 

hypertension, diabetes, depression, paroxysmal afib p/w worsening shortness of 

breath found to be in Acute Hypoxic Hypercapnic respiratory failure  in the 

setting of Pneumonia








Acute hypoxic/hypercapnic respiratory failure: in the setting of Pneumonia and  

chronic interstitial lung disease


- Chest CT revealed interval worsening of peribronchovascular consolidation 


- MRSA positive, influenza negative


- Cefepime reinitiated  3/25 due to clinical worsening. 


- Plan to convert to oral abx tomorrow


- Continue broad spectrum abx ( Doxy, Levaquin, Cefepime)


- Elevated white ct potentially due to steroid dosing vs Pneumonia


- Steroids 60 mg PO, Plan to taper tomorrow


- Maintain on BIPAP at night, NC 4L during the day





Hyponatremia/LON


- chronically hyponatremic with possibel SIADH acute component


- ?SIADH +/- mild heart failure


- Continue Sodium tablets, fluid restriction


- Urine osmolality 294 and serum osmolality 277


- Cr improving


- Monitor BMP





NSTEMI:


-Patient is allergic to aspirin


-Continue pravastatin


-Echo : .EF 60-65%.  No regional wall motion abnormalities.  Mild concentric LVH

, Severe LA dilation, severe TR, RVSP- 37mmHg


  


Atrial fibrillation with rapid ventricular rate:


- Hx of paroxysmal atrial fib 


- Continue Cardizem and Atenolol


- Continue Xarelto qpm





Hypertension :


-Continue atenolol and diltiazem


 


Hypervolemia:


- Echo as above 


- Lasix remains held 





Steroid-induced hyperglycemia


-Monitor blood sugar





RA 


- Placed on steroids


- Continue Cimzia as output when recovers from PNM





DVT prophylaxis: 


Rivaroxaban





DNR





Disp: F/u with case management  for rehab


Continued [facility] stay due to:  multiple IV medications needed


Discharge planning:  rehab hospital





Assessment/Plan


Resident Physician Supervision Note:





I was present with Dr. Stuart during the history and exam. I discussed the case 

with the resident and agree with the findings and plan as documented in the 

note.  Any exceptions or clarifications are listed here.





Pt resting in bed reporting improving breathing and resolution of cough. 

Normally at 3L O2 NC at home during daytime and nighttime CPAP w/ O2 which she 

uses intermittently 2/2 claustrophobia. Feels that the BIPAP here is better/

more comfortable for her. Lungs ronchorous at bases but no rales.





Will continue current abx coverage until tomorrow and narrow based on 

antibiogram and clinical response as Cx have been negative. Weaning O2 as 

tolerated. Continue steroid therapy and monitor glucose levels with adjustments 

to coverage as needed.





Resident Tracking


Resident Involvement:  Resident Care Provided


Care Provided:  Adult Hospital Medicine

## 2018-03-27 VITALS
DIASTOLIC BLOOD PRESSURE: 82 MMHG | TEMPERATURE: 98.24 F | SYSTOLIC BLOOD PRESSURE: 142 MMHG | HEART RATE: 58 BPM | OXYGEN SATURATION: 98 %

## 2018-03-27 VITALS
TEMPERATURE: 97.88 F | SYSTOLIC BLOOD PRESSURE: 139 MMHG | OXYGEN SATURATION: 91 % | HEART RATE: 57 BPM | DIASTOLIC BLOOD PRESSURE: 83 MMHG

## 2018-03-27 VITALS
OXYGEN SATURATION: 99 % | HEART RATE: 80 BPM | TEMPERATURE: 97.7 F | DIASTOLIC BLOOD PRESSURE: 93 MMHG | SYSTOLIC BLOOD PRESSURE: 134 MMHG

## 2018-03-27 VITALS — OXYGEN SATURATION: 98 % | HEART RATE: 68 BPM

## 2018-03-27 LAB
BASOPHILS # BLD: 0.01 K/UL (ref 0–0.2)
BASOPHILS NFR BLD: 0 %
BUN SERPL-MCNC: 38 MG/DL (ref 7–18)
CALCIUM SERPL-MCNC: 8.3 MG/DL (ref 8.5–10.1)
CO2 SERPL-SCNC: 40 MMOL/L (ref 21–32)
CREAT SERPL-MCNC: 1.14 MG/DL (ref 0.6–1.2)
EOS ABS #: 0.02 K/UL (ref 0–0.5)
EOSINOPHIL NFR BLD AUTO: 218 K/UL (ref 130–400)
GLUCOSE SERPL-MCNC: 128 MG/DL (ref 70–99)
HCT VFR BLD CALC: 37.8 % (ref 37–47)
HGB BLD-MCNC: 13 G/DL (ref 12–16)
IG#: 0.25 K/UL (ref 0–0.02)
IMM GRANULOCYTES NFR BLD AUTO: 8.9 %
LYMPHOCYTES # BLD: 1.92 K/UL (ref 1.2–3.4)
MCH RBC QN AUTO: 32.5 PG (ref 25–34)
MCHC RBC AUTO-ENTMCNC: 34.4 G/DL (ref 32–36)
MCV RBC AUTO: 94.5 FL (ref 80–100)
MONO ABS #: 1.73 K/UL (ref 0.11–0.59)
MONOCYTES NFR BLD: 8 %
NEUT ABS #: 17.72 K/UL (ref 1.4–6.5)
NEUTROPHILS # BLD AUTO: 0.1 %
NEUTROPHILS NFR BLD AUTO: 81.8 %
PMV BLD AUTO: 9.2 FL (ref 7.4–10.4)
POTASSIUM SERPL-SCNC: 4.3 MMOL/L (ref 3.5–5.1)
RED CELL DISTRIBUTION WIDTH CV: 14.1 % (ref 11.5–14.5)
RED CELL DISTRIBUTION WIDTH SD: 48.3 FL (ref 36.4–46.3)
SODIUM SERPL-SCNC: 122 MMOL/L (ref 136–145)
WBC # BLD AUTO: 21.65 K/UL (ref 4.8–10.8)

## 2018-03-27 RX ADMIN — DILTIAZEM HYDROCHLORIDE SCH MG: 60 TABLET, FILM COATED ORAL at 09:34

## 2018-03-27 RX ADMIN — SODIUM CHLORIDE TAB 1 GM SCH GM: 1 TAB at 08:04

## 2018-03-27 RX ADMIN — Medication SCH TAB: at 22:26

## 2018-03-27 RX ADMIN — CEFEPIME SCH MLS/MIN: 2 INJECTION, POWDER, FOR SOLUTION INTRAVENOUS at 08:11

## 2018-03-27 RX ADMIN — INSULIN ASPART SCH UNITS: 100 INJECTION, SOLUTION INTRAVENOUS; SUBCUTANEOUS at 12:03

## 2018-03-27 RX ADMIN — INSULIN GLARGINE SCH UNITS: 100 INJECTION, SOLUTION SUBCUTANEOUS at 08:10

## 2018-03-27 RX ADMIN — DICLOFENAC SODIUM SCH APPLN: 10 GEL TOPICAL at 17:11

## 2018-03-27 RX ADMIN — DOXYCYCLINE HYCLATE SCH MG: 100 CAPSULE, GELATIN COATED ORAL at 22:24

## 2018-03-27 RX ADMIN — DILTIAZEM HYDROCHLORIDE SCH MG: 60 TABLET, FILM COATED ORAL at 17:11

## 2018-03-27 RX ADMIN — DILTIAZEM HYDROCHLORIDE SCH MG: 60 TABLET, FILM COATED ORAL at 04:00

## 2018-03-27 RX ADMIN — DILTIAZEM HYDROCHLORIDE SCH MG: 60 TABLET, FILM COATED ORAL at 22:26

## 2018-03-27 RX ADMIN — RIVAROXABAN SCH MG: 15 TABLET, FILM COATED ORAL at 17:21

## 2018-03-27 RX ADMIN — SODIUM CHLORIDE TAB 1 GM SCH GM: 1 TAB at 12:01

## 2018-03-27 RX ADMIN — PANTOPRAZOLE SCH MG: 40 TABLET, DELAYED RELEASE ORAL at 12:01

## 2018-03-27 RX ADMIN — Medication SCH TAB: at 08:04

## 2018-03-27 RX ADMIN — INSULIN ASPART SCH UNITS: 100 INJECTION, SOLUTION INTRAVENOUS; SUBCUTANEOUS at 08:10

## 2018-03-27 RX ADMIN — DICLOFENAC SODIUM SCH APPLN: 10 GEL TOPICAL at 08:04

## 2018-03-27 RX ADMIN — DICLOFENAC SODIUM SCH APPLN: 10 GEL TOPICAL at 12:01

## 2018-03-27 RX ADMIN — INSULIN ASPART SCH UNITS: 100 INJECTION, SOLUTION INTRAVENOUS; SUBCUTANEOUS at 17:38

## 2018-03-27 RX ADMIN — Medication SCH TAB: at 22:25

## 2018-03-27 RX ADMIN — SODIUM CHLORIDE TAB 1 GM SCH GM: 1 TAB at 17:11

## 2018-03-27 RX ADMIN — SODIUM CHLORIDE TAB 1 GM SCH GM: 1 TAB at 22:25

## 2018-03-27 RX ADMIN — DOXYCYCLINE HYCLATE SCH MG: 100 CAPSULE, GELATIN COATED ORAL at 08:04

## 2018-03-27 RX ADMIN — PRAVASTATIN SODIUM SCH MG: 40 TABLET ORAL at 22:25

## 2018-03-27 RX ADMIN — LEVOFLOXACIN SCH MG: 750 TABLET, FILM COATED ORAL at 11:25

## 2018-03-27 RX ADMIN — FUROSEMIDE SCH MG: 40 TABLET ORAL at 08:04

## 2018-03-27 RX ADMIN — INSULIN ASPART SCH UNITS: 100 INJECTION, SOLUTION INTRAVENOUS; SUBCUTANEOUS at 21:00

## 2018-03-27 NOTE — FAMILY MEDICINE PROGRESS NOTE
Progress Note


Date of Service


Mar 27, 2018.





Subjective


Pt evaluation today including:  conversation w/ patient, conversation w/ family

, physical exam, chart review, lab review, review of studies, review of 

inpatient medication list


Pain:  denies


PO Intake:  adequate


Voiding:  no voiding problems


Per nursing, patient seems to express reluctance to continue antibiotics 

because she wasn't sure if it would help. I discussed with patient the benefits 

of antibiotic treatment  and discussed that her prognosis in the short term is 

not immediately fatal. Pt agreed to continue medication





   Additional Comments:


   Constitutional:  No fever, No chills


   Respiratory:  + shortness of breath, No cough, No wheezing


   Cardiovascular:  No chest pain, No edema, No palpitations


   Abdomen:  No pain, No nausea, No vomiting, No diarrhea, No constipation


   Musculoskeletal:  No swelling, No calf pain


   Female :  No dysuria





Medications





Current Inpatient Medications








 Medications


  (Trade)  Dose


 Ordered  Sig/Walter


 Route  Start Time


 Stop Time Status Last Admin


Dose Admin


 


 Al Hydrox/Mg


 Hydrox/Simethicone


  (Maalox Max Susp)  15 ml  Q4H  PRN


 PO  3/19/18 00:15


 4/18/18 00:14   


 


 


 Magnesium


 Hydroxide


  (Milk Of


 Magnesia Susp)  30 ml  Q12H  PRN


 PO  3/19/18 00:15


 4/18/18 00:14  3/24/18 15:29


30 ML


 


 Ondansetron HCl


  (Zofran Inj)  4 mg  Q6H  PRN


 IV  3/19/18 00:15


 4/18/18 00:14  3/22/18 11:07


4 MG


 


 Nitroglycerin


  (Nitrostat Tab)  0.4 mg  UD  PRN


 SL  3/19/18 00:15


 4/18/18 00:14   


 


 


 Polyethylene


  (Miralax Powder


 Packet)  17 gm  DAILY  PRN


 PO  3/19/18 00:15


 4/18/18 00:14  3/24/18 15:29


17 GM


 


 Acetaminophen/


 Butalbital/


 Caffeine


  (Fioricet Tab)  1 tab  DAILY  PRN


 PO  3/19/18 00:15


 4/18/18 00:14   


 


 


 Calcium/Vitamin D


  (Caltrate Plus


 Tab)  1 tab  BID


 PO  3/19/18 09:00


 4/18/18 08:59  3/27/18 08:04


1 TAB


 


 Clonazepam


  (Klonopin Tab)  0.5 mg  HS  PRN


 PO  3/19/18 00:15


 4/18/18 00:14  3/22/18 03:06


0.5 MG


 


 Multivitamins/


 Minerals


  (Multivitamin W/


 Minerals Tab)  1 tab  BID


 PO  3/19/18 09:00


 4/18/18 08:59  3/27/18 08:04


1 TAB


 


 Pantoprazole


 Sodium


  (Protonix Tab)  40 mg  QDL


 PO  3/19/18 11:00


 4/18/18 10:59  3/27/18 12:01


40 MG


 


 Pravastatin Sodium


  (Pravachol Tab)  80 mg  QPM


 PO  3/19/18 21:00


 4/18/18 20:59  3/26/18 20:55


80 MG


 


 Miscellaneous


  (Iv Fluids


 Completed)  1 ea  PRN  PRN


 N/A  3/19/18 03:30


 3/19/19 03:29   


 


 


 Diclofenac Sodium


  (Voltaren 1% Top


 Gel)  1 appln  3XDQ4


 EXT  3/19/18 16:00


 4/18/18 15:59  3/27/18 12:01


1 APPLN


 


 Glucagon


  (Glucagon Inj)  1 mg  UD  PRN


 SQ  3/19/18 19:15


 4/18/18 19:14   


 


 


 Glucose


  (Glucose 40% Gel)  15-30


 GRAMS 15


 GRAMS...  UD  PRN


 PO  3/19/18 19:15


 4/18/18 19:14   


 


 


 Glucose


  (Glucose Chew


 Tab)  4-8


 Tablets 4


 Tabl...  UD  PRN


 PO  3/19/18 19:15


 4/18/18 19:14   


 


 


 Insulin Aspart


  (novoLOG ASPART)  **SLIDING


 SCALE**


 **If C...  ACHS


 SC  3/20/18 07:00


 4/19/18 06:59  3/27/18 12:03


6 UNITS


 


 Diltiazem HCl


  (Cardizem Tab)  60 mg  Q6H


 PO  3/20/18 04:00


 4/19/18 03:59  3/27/18 04:00


60 MG


 


 Atenolol


  (Tenormin Tab)  25 mg  QPM


 PO  3/21/18 21:00


 4/18/18 20:59  3/26/18 20:56


25 MG


 


 Rivaroxaban


  (Xarelto Tab)  15 mg  QDD


 PO  3/21/18 16:45


 4/20/18 16:44  3/26/18 16:40


15 MG


 


 Prednisone


  (PredniSONE TAB)  60 mg  DAILY


 PO  3/22/18 09:00


 4/21/18 08:59  3/27/18 08:04


60 MG


 


 Doxycycline


 Hyclate


  (Vibramycin Cap)  100 mg  BID


 PO  3/22/18 21:00


 3/29/18 20:59  3/27/18 08:04


100 MG


 


 Acetaminophen


  (Tylenol Tab)  650 mg  Q6H  PRN


 PO  3/22/18 10:45


 4/21/18 10:44  3/25/18 17:13


650 MG


 


 Levofloxacin


  (Consult)  1 ea  UD  PRN


 N/A  3/22/18 12:15


 4/21/18 12:14   


 


 


 Sodium Chloride


  (Sodium Chloride


 Tab)  1 gm  QID


 PO  3/24/18 17:00


 4/23/18 16:59  3/27/18 12:01


1 GM


 


 Cefepime HCl 2000


 mg/Syringe  20 ml @ 5


 mls/min  Q12H


 IV  3/24/18 20:00


 3/31/18 19:59  3/27/18 08:11


5 MLS/MIN


 


 Insulin Glargine


  (Lantus Solostar


 Pen)  10 units  QAM


 SC  3/26/18 09:00


 4/25/18 08:59  3/27/18 08:10


10 UNITS


 


 Furosemide


  (Lasix Tab)  40 mg  QAM


 PO  3/26/18 10:30


 4/25/18 10:29  3/27/18 08:04


40 MG


 


 Levofloxacin


  (Levaquin Tab)  750 mg  Q2D@1100


 PO  3/27/18 11:00


 4/3/18 10:59  3/27/18 11:25


750 MG


 


 Acetazolamide


  (Diamox Tab)  250 mg  BID


 PO  3/27/18 09:00


 3/30/18 08:59  3/27/18 09:33


250 MG











Objective


Vital Signs











  Date Time  Temp Pulse Resp B/P (MAP) Pulse Ox O2 Delivery O2 Flow Rate FiO2


 


3/27/18 08:00      Nasal Cannula 4.0 


 


3/27/18 06:56 36.8 58 18 142/82 (102) 98  4.0 


 


3/27/18 00:26 36.6 57 18 139/83 (101) 91 BiPAP  


 


3/27/18 00:00      BiPAP 4.0 


 


3/26/18 22:06  66   95  5.0 


 


3/26/18 15:40      Nasal Cannula 4.0 


 


3/26/18 15:08 36.6 70 18 150/83 (105) 98 Nasal Cannula 5.0 











Physical Exam


Notes:


GENERAL: alert,  no distress 


EYE EXAM: normal conjunctiva, PERRL and EOM's grossly intact


NECK: supple, no nuchal rigidity, no adenopathy, non-tender


LUNGS: Bilateral Rhonchi, NO wheeze Normal chest wall mechanics


HEART: S1 normal and S2 normal 


ABDOMEN: abdomen soft, non-tender, normo-active bowel sounds, no masses, no 

rebound or guarding. 


UPPER EXTREMITIES: upper extremities are grossly normal. 


LOWER EXTREMITIES: No pitting edema.


NEURO EXAM: Normal sensorium, cranial nerves II-XII grossly intact, normal 

speech





Laboratory Results





Results Past 24 Hours








Test


  3/26/18


16:12 3/26/18


18:09 3/26/18


18:54 3/26/18


20:25 Range/Units


 


 


Bedside Glucose 159   176 70-90  mg/dl


 


Sodium Level  120   136-145  mmol/L


 


Potassium Level   4.7  3.5-5.1  mmol/L


 


Chloride Level  78     mmol/L


 


Carbon Dioxide Level  37   21-32  mmol/L


 


Anion Gap  5.0   3-11  mmol/L


 


Blood Urea Nitrogen  44   7-18  mg/dl


 


Creatinine


  


  1.39


  


  


  0.60-1.20


mg/dl


 


Est Creatinine Clear Calc


Drug Dose 


  31.4


  


  


   ml/min


 


 


Estimated GFR (


American) 


  41.4


  


  


   


 


 


Estimated GFR (Non-


American 


  35.7


  


  


   


 


 


BUN/Creatinine Ratio  31.4   10-20  


 


Random Glucose  185   70-99  mg/dl


 


Calcium Level  8.0   8.5-10.1  mg/dl


 


Test


  3/27/18


02:30 3/27/18


06:44 3/27/18


06:48 3/27/18


07:21 Range/Units


 


 


Urine Color YELLOW     


 


Urine Appearance CLEAR    CLEAR  


 


Urine pH 8.0    4.5-7.5  


 


Urine Specific Gravity 1.014    1.000-1.030  


 


Urine Protein NEG    NEG  


 


Urine Glucose (UA) NEG    NEG  


 


Urine Ketones NEG    NEG  


 


Urine Occult Blood 1+    NEG  


 


Urine Nitrite NEG    NEG  


 


Urine Bilirubin NEG    NEG  


 


Urine Urobilinogen NEG    NEG  


 


Urine Leukocyte Esterase NEG    NEG  


 


Urine WBC (Auto) 1-5    0-5  /hpf


 


Urine RBC (Auto) 0-4    0-4  /hpf


 


Urine Hyaline Casts (Auto) 1-5    0-5  /lpf


 


Urine Epithelial Cells (Auto) 10-20    0-5  /lpf


 


Urine Bacteria (Auto) NEG    NEG  


 


Urine Yeast (Auto) BUDDING    NONE PRSENT  


 


Urine Osmolality


  418


  


  


  


  500-800


mOms/kg


 


Sodium Level  122   136-145  mmol/L


 


Potassium Level  4.3   3.5-5.1  mmol/L


 


Chloride Level  79     mmol/L


 


Carbon Dioxide Level  40   21-32  mmol/L


 


Anion Gap  2.0   3-11  mmol/L


 


Blood Urea Nitrogen  38   7-18  mg/dl


 


Creatinine


  


  1.14


  


  


  0.60-1.20


mg/dl


 


Est Creatinine Clear Calc


Drug Dose 


  38.3


  


  


   ml/min


 


 


Estimated GFR (


American) 


  52.6


  


  


   


 


 


Estimated GFR (Non-


American 


  45.4


  


  


   


 


 


BUN/Creatinine Ratio  33.2   10-20  


 


Random Glucose  128   70-99  mg/dl


 


Calcium Level  8.3   8.5-10.1  mg/dl


 


White Blood Count   21.65  4.8-10.8  K/uL


 


Red Blood Count   4.00  4.2-5.4  M/uL


 


Hemoglobin   13.0  12.0-16.0  g/dL


 


Hematocrit   37.8  37-47  %


 


Mean Corpuscular Volume   94.5    fL


 


Mean Corpuscular Hemoglobin   32.5  25-34  pg


 


Mean Corpuscular Hemoglobin


Concent 


  


  34.4


  


  32-36  g/dl


 


 


Platelet Count   218  130-400  K/uL


 


Mean Platelet Volume   9.2  7.4-10.4  fL


 


Neutrophils (%) (Auto)   81.8   %


 


Lymphocytes (%) (Auto)   8.9   %


 


Monocytes (%) (Auto)   8.0   %


 


Eosinophils (%) (Auto)   0.1   %


 


Basophils (%) (Auto)   0.0   %


 


Neutrophils # (Auto)   17.72  1.4-6.5  K/uL


 


Lymphocytes # (Auto)   1.92  1.2-3.4  K/uL


 


Monocytes # (Auto)   1.73  0.11-0.59  K/uL


 


Eosinophils # (Auto)   0.02  0-0.5  K/uL


 


Basophils # (Auto)   0.01  0-0.2  K/uL


 


RDW Standard Deviation   48.3  36.4-46.3  fL


 


RDW Coefficient of Variation   14.1  11.5-14.5  %


 


Immature Granulocyte % (Auto)   1.2   %


 


Immature Granulocyte # (Auto)   0.25  0.00-0.02  K/uL


 


Bedside Glucose    123 70-90  mg/dl


 


Test


  3/27/18


11:31 3/27/18


12:01 


  


  Range/Units


 


 


Bedside Glucose 220    70-90  mg/dl











Assessment and Plan


79yo F w/ history of  rheumatoid arthritis with interstitial lung disease, 

hypertension, diabetes, depression, paroxysmal afib p/w worsening shortness of 

breath found to be in Acute Hypoxic Hypercapnic respiratory failure  in the 

setting of Pneumonia








Acute hypoxic/hypercapnic respiratory failure: in the setting of Pneumonia and  

chronic interstitial lung disease


- Chest CT revealed interval worsening of peribronchovascular consolidation 


- MRSA positive, influenza negative


- Cefepime reinitiated  3/25 due to clinical worsening. 


- Convert to oral abx equivalent today.  Plan to switch Cefepime to Cefdinir


- Continue  Doxy, Levaquin


- Elevated white ct potentially due to steroid dosing vs Pneumonia


- Steroids 60 mg PO --> 40 mg daily ( Starting gradual taper)


- Maintain on BIPAP at night, NC 4L during the day


- Will need Bipap on discharge





Hyponatremia/LON


- chronically hyponatremic with possible SIADH acute component


 -improved Na


- ?SIADH +/- mild heart failure


- Continue Sodium tablets, fluid restriction


- Urine osmolality 294 and serum osmolality 277


- Cr improving


- Monitor BMP





NSTEMI:


-Patient is allergic to aspirin


-Continue pravastatin


-Echo : .EF 60-65%.  No regional wall motion abnormalities.  Mild concentric LVH

, Severe LA dilation, severe TR, RVSP- 37mmHg


  


Atrial fibrillation with rapid ventricular rate:


- Hx of paroxysmal atrial fib 


- Continue Cardizem and Atenolol


- Continue Xarelto qpm





Hypertension :


-Continue atenolol and diltiazem


 


Hypervolemia:


- Echo as above 


- Lasix remains held 





Steroid-induced hyperglycemia


-Monitor blood sugar





RA 


- Placed on steroids


- Continue Cimzia as output when recovers from PNM





DVT prophylaxis: 


Rivaroxaban





DNR





Disp: [facility] referral made. [facility] refuses


Continued [facility] stay due to:  multiple IV medications needed


Discharge planning:  rehab hospital





Resident Tracking


Resident Involvement:  Resident Care Provided


Care Provided:  Adult Hospital Medicine





Assessment/Plan


Resident Physician Supervision Note:





I was present with Dr. Stuart during the history and exam. I discussed the case 

with the resident and agree with the findings and plan as documented in the 

note.  Any exceptions or clarifications are listed here.





Baseline at 3L O2 NC at home during daytime and nighttime CPAP w/ O2 which she 

uses intermittently 2/2 claustrophobia. 





Per patient, shortness of breath is subjectively unchanged though the cough 

which was present on admission has essentially resolved. She expresses a wish 

to be allowed to pass naturally while on rounds. When queried, she wants to 

live a 'normal life' which she defines as having the respiratory status which 

she came with on admission. I explained to her that her condition is improving, 

and that she may return to her previous baseline with resolution of her 

symptoms and potential rehabilitation, and that her condition appears to be 

improving from a clinical perspective. I detailed that she would likely 

deteriorate if we withdrawal her interventions at this point, and encouraged 

her to discuss outlook with family - hopefully to determine completion of 

present treatment and possible DNR/DNH hospice transition following. 





Narrow Abx coverage today, taper steroids slowly and monitor clinically for re-

assessment in AM.

## 2018-03-27 NOTE — NEPHROLOGY PROGRESS NOTE
Nephrology Progress Note


Date of Service


Mar 27, 2018.





Chief Complaint


F/U for hyponatremia and acute kidney injury.





Subjective


Greta was seen and examined in her room this morning.  Overall feeling poorly 

and continues to have some shortness of breath.  She has been net negative. 

Serum sodium did not improve as expected after 3% saline yesterday.  Urine 

osmolality remained elevated.





Review of Systems


A complete review of systems was performed.  Pertinent positives are noted 

above. All other systems are negative.





Vital Signs





Last 8 Hrs








  Date Time  Temp Pulse Resp B/P (MAP) Pulse Ox O2 Delivery O2 Flow Rate FiO2


 


3/27/18 06:56 36.8 58 18 142/82 (102) 98  4.0 


 


3/27/18 00:26 36.6 57 18 139/83 (101) 91 BiPAP  











Last Recorded Weight


Weight (Kilograms):  71.200





Physical Exam


GENERAL:  Elderly female, AAA x 3, ill appearing, in mild distress.


NECK: Supple, no JVD.


RESPIRATORY:  Crackles bilaterally at bases


CARDIOVASCULAR: S1, S2 normal, rate rhythm regular.


EXTREMITY:  Trace bilateral lower extremity edema


NEURO: speech fluent.


PSYCHIATRY:  Depressed mood





Family History





Cancer


Diabetes mellitus


Gallbladder disease


Heart disease


Lung disease





Social History


Smoking Status:  Never smoker


Smokeless Tobacco Use:  No


Alcohol Use:  none


Drug Use:  none


Marital Status:  


Occupation:  retired





Laboratory Results


Past 24 Hours


3/27/18 06:48








Red Blood Count 4.00, Mean Corpuscular Volume 94.5, Mean Corpuscular Hemoglobin 

32.5, Mean Corpuscular Hemoglobin Concent 34.4, Mean Platelet Volume 9.2, 

Neutrophils (%) (Auto) 81.8, Lymphocytes (%) (Auto) 8.9, Monocytes (%) (Auto) 

8.0, Eosinophils (%) (Auto) 0.1, Basophils (%) (Auto) 0.0, Neutrophils # (Auto) 

17.72, Lymphocytes # (Auto) 1.92, Monocytes # (Auto) 1.73, Eosinophils # (Auto) 

0.02, Basophils # (Auto) 0.01





3/26/18 12:24








3/26/18 18:09








3/26/18 18:54








3/27/18 06:44

















Test


  3/26/18


11:16 3/26/18


12:24 3/26/18


16:12 3/26/18


18:09


 


Bedside Glucose


  187 mg/dl


(70-90) 


  159 mg/dl


(70-90) 


 


 


Thyroid Stimulating Hormone


(TSH) 


  1.290 uIu/ml


(0.300-4.500) 


  


 


 


Random Cortisol  45.79 mcg/dl   


 


Anion Gap


  


  


  


  5.0 mmol/L


(3-11)


 


Est Creatinine Clear Calc


Drug Dose 


  


  


  31.4 ml/min 


 


 


Estimated GFR (


American) 


  


  


  41.4 


 


 


Estimated GFR (Non-


American 


  


  


  35.7 


 


 


BUN/Creatinine Ratio    31.4 (10-20) 


 


Calcium Level


  


  


  


  8.0 mg/dl


(8.5-10.1)


 


Test


  3/26/18


20:25 3/27/18


02:30 3/27/18


06:44 3/27/18


06:48


 


Bedside Glucose


  176 mg/dl


(70-90) 


  


  


 


 


Urine Color  YELLOW   


 


Urine Appearance  CLEAR (CLEAR)   


 


Urine pH  8.0 (4.5-7.5)   


 


Urine Specific Gravity


  


  1.014


(1.000-1.030) 


  


 


 


Urine Protein  NEG (NEG)   


 


Urine Glucose (UA)  NEG (NEG)   


 


Urine Ketones  NEG (NEG)   


 


Urine Occult Blood  1+ (NEG)   


 


Urine Nitrite  NEG (NEG)   


 


Urine Bilirubin  NEG (NEG)   


 


Urine Urobilinogen  NEG (NEG)   


 


Urine Leukocyte Esterase  NEG (NEG)   


 


Urine WBC (Auto)  1-5 /hpf (0-5)   


 


Urine RBC (Auto)  0-4 /hpf (0-4)   


 


Urine Hyaline Casts (Auto)  1-5 /lpf (0-5)   


 


Urine Epithelial Cells (Auto)


  


  10-20 /lpf


(0-5) 


  


 


 


Urine Bacteria (Auto)  NEG (NEG)   


 


Urine Yeast (Auto)


  


  BUDDING (NONE


PRSENT) 


  


 


 


Urine Osmolality


  


  418 mOms/kg


(500-800) 


  


 


 


Anion Gap


  


  


  2.0 mmol/L


(3-11) 


 


 


Est Creatinine Clear Calc


Drug Dose 


  


  38.3 ml/min 


  


 


 


Estimated GFR (


American) 


  


  52.6 


  


 


 


Estimated GFR (Non-


American 


  


  45.4 


  


 


 


BUN/Creatinine Ratio   33.2 (10-20)  


 


Calcium Level


  


  


  8.3 mg/dl


(8.5-10.1) 


 


 


White Blood Count


  


  


  


  21.65 K/uL


(4.8-10.8)


 


Red Blood Count


  


  


  


  4.00 M/uL


(4.2-5.4)


 


Hemoglobin


  


  


  


  13.0 g/dL


(12.0-16.0)


 


Hematocrit    37.8 % (37-47) 


 


Mean Corpuscular Volume


  


  


  


  94.5 fL


()


 


Mean Corpuscular Hemoglobin


  


  


  


  32.5 pg


(25-34)


 


Mean Corpuscular Hemoglobin


Concent 


  


  


  34.4 g/dl


(32-36)


 


Platelet Count


  


  


  


  218 K/uL


(130-400)


 


Mean Platelet Volume


  


  


  


  9.2 fL


(7.4-10.4)


 


Neutrophils (%) (Auto)    81.8 % 


 


Lymphocytes (%) (Auto)    8.9 % 


 


Monocytes (%) (Auto)    8.0 % 


 


Eosinophils (%) (Auto)    0.1 % 


 


Basophils (%) (Auto)    0.0 % 


 


Neutrophils # (Auto)


  


  


  


  17.72 K/uL


(1.4-6.5)


 


Lymphocytes # (Auto)


  


  


  


  1.92 K/uL


(1.2-3.4)


 


Monocytes # (Auto)


  


  


  


  1.73 K/uL


(0.11-0.59)


 


Eosinophils # (Auto)


  


  


  


  0.02 K/uL


(0-0.5)


 


Basophils # (Auto)


  


  


  


  0.01 K/uL


(0-0.2)


 


RDW Standard Deviation


  


  


  


  48.3 fL


(36.4-46.3)


 


RDW Coefficient of Variation


  


  


  


  14.1 %


(11.5-14.5)


 


Immature Granulocyte % (Auto)    1.2 % 


 


Immature Granulocyte # (Auto)


  


  


  


  0.25 K/uL


(0.00-0.02)


 


Test


  3/27/18


07:21 


  


  


 


 


Bedside Glucose


  123 mg/dl


(70-90) 


  


  


 











Allergies


Coded Allergies:  


     Amoxicillin (Verified  Allergy, Unknown, UNKNOWN, 3/18/18)


     Clavulanic Acid (Verified  Allergy, Unknown, UNKNOWN, 3/18/18)


     Clindamycin (Verified  Allergy, Unknown, UNKNOWN, 3/18/18)


     Penicillins (Verified  Allergy, Unknown, UNKNOWN, 3/18/18)


     Sulfa Antibiotics (Verified  Adverse Reaction, Intermediate, NAUSEA, 3/18/

18)


     Aspirin (Verified  Adverse Reaction, Mild, GI SYMPTOMS, 3/18/18)


Uncoded Allergies:  


     ARTIFICIAL SWEETNER (Allergy, Mild, TONGUE SORE, 5/9/13)





Medications





Current Inpatient Medications








 Medications


  (Trade)  Dose


 Ordered  Sig/Walter


 Route  Start Time


 Stop Time Status Last Admin


Dose Admin


 


 Al Hydrox/Mg


 Hydrox/Simethicone


  (Maalox Max Susp)  15 ml  Q4H  PRN


 PO  3/19/18 00:15


 4/18/18 00:14   


 


 


 Magnesium


 Hydroxide


  (Milk Of


 Magnesia Susp)  30 ml  Q12H  PRN


 PO  3/19/18 00:15


 4/18/18 00:14  3/24/18 15:29


30 ML


 


 Ondansetron HCl


  (Zofran Inj)  4 mg  Q6H  PRN


 IV  3/19/18 00:15


 4/18/18 00:14  3/22/18 11:07


4 MG


 


 Nitroglycerin


  (Nitrostat Tab)  0.4 mg  UD  PRN


 SL  3/19/18 00:15


 4/18/18 00:14   


 


 


 Polyethylene


  (Miralax Powder


 Packet)  17 gm  DAILY  PRN


 PO  3/19/18 00:15


 4/18/18 00:14  3/24/18 15:29


17 GM


 


 Acetaminophen/


 Butalbital/


 Caffeine


  (Fioricet Tab)  1 tab  DAILY  PRN


 PO  3/19/18 00:15


 4/18/18 00:14   


 


 


 Calcium/Vitamin D


  (Caltrate Plus


 Tab)  1 tab  BID


 PO  3/19/18 09:00


 4/18/18 08:59  3/27/18 08:04


1 TAB


 


 Clonazepam


  (Klonopin Tab)  0.5 mg  HS  PRN


 PO  3/19/18 00:15


 4/18/18 00:14  3/22/18 03:06


0.5 MG


 


 Multivitamins/


 Minerals


  (Multivitamin W/


 Minerals Tab)  1 tab  BID


 PO  3/19/18 09:00


 4/18/18 08:59  3/27/18 08:04


1 TAB


 


 Pantoprazole


 Sodium


  (Protonix Tab)  40 mg  QDL


 PO  3/19/18 11:00


 4/18/18 10:59  3/26/18 11:59


40 MG


 


 Pravastatin Sodium


  (Pravachol Tab)  80 mg  QPM


 PO  3/19/18 21:00


 4/18/18 20:59  3/26/18 20:55


80 MG


 


 Miscellaneous


  (Iv Fluids


 Completed)  1 ea  PRN  PRN


 N/A  3/19/18 03:30


 3/19/19 03:29   


 


 


 Diclofenac Sodium


  (Voltaren 1% Top


 Gel)  1 appln  [id number]


 EXT  3/19/18 16:00


 4/18/18 15:59  3/27/18 08:04


1 APPLN


 


 Glucagon


  (Glucagon Inj)  1 mg  UD  PRN


 SQ  3/19/18 19:15


 4/18/18 19:14   


 


 


 Glucose


  (Glucose 40% Gel)  15-30


 GRAMS 15


 GRAMS...  UD  PRN


 PO  3/19/18 19:15


 4/18/18 19:14   


 


 


 Glucose


  (Glucose Chew


 Tab)  4-8


 Tablets 4


 Tabl...  UD  PRN


 PO  3/19/18 19:15


 4/18/18 19:14   


 


 


 Insulin Aspart


  (novoLOG ASPART)  **SLIDING


 SCALE**


 **If C...  ACHS


 SC  3/20/18 07:00


 4/19/18 06:59  3/27/18 08:10


4 UNITS


 


 Diltiazem HCl


  (Cardizem Tab)  60 mg  Q6H


 PO  3/20/18 04:00


 4/19/18 03:59  3/27/18 04:00


60 MG


 


 Atenolol


  (Tenormin Tab)  25 mg  QPM


 PO  3/21/18 21:00


 4/18/18 20:59  3/26/18 20:56


25 MG


 


 Rivaroxaban


  (Xarelto Tab)  15 mg  QDD


 PO  3/21/18 16:45


 4/20/18 16:44  3/26/18 16:40


15 MG


 


 Prednisone


  (PredniSONE TAB)  60 mg  DAILY


 PO  3/22/18 09:00


 4/21/18 08:59  3/27/18 08:04


60 MG


 


 Doxycycline


 Hyclate


  (Vibramycin Cap)  100 mg  BID


 PO  3/22/18 21:00


 3/29/18 20:59  3/27/18 08:04


100 MG


 


 Acetaminophen


  (Tylenol Tab)  650 mg  Q6H  PRN


 PO  3/22/18 10:45


 4/21/18 10:44  3/25/18 17:13


650 MG


 


 Levofloxacin


  (Consult)  1 ea  UD  PRN


 N/A  3/22/18 12:15


 4/21/18 12:14   


 


 


 Sodium Chloride


  (Sodium Chloride


 Tab)  1 gm  QID


 PO  3/24/18 17:00


 4/23/18 16:59  3/27/18 08:04


1 GM


 


 Cefepime HCl 2000


 mg/Syringe  20 ml @ 5


 mls/min  Q12H


 IV  3/24/18 20:00


 3/31/18 19:59  3/27/18 08:11


5 MLS/MIN


 


 Insulin Glargine


  (Lantus Solostar


 Pen)  10 units  QAM


 SC  3/26/18 09:00


 4/25/18 08:59  3/27/18 08:10


10 UNITS


 


 Furosemide


  (Lasix Tab)  40 mg  QAM


 PO  3/26/18 10:30


 4/25/18 10:29  3/27/18 08:04


40 MG


 


 Levofloxacin


  (Levaquin Tab)  750 mg  Q2D@1100


 PO  3/27/18 11:00


 4/3/18 10:59   


 











Impression





(1) Hyponatremia


(2) Acute kidney injury


(3) Acute on chronic respiratory failure with hypoxia and hypercapnia


(4) Hypertension


78-year-old female with acute hyponatremia with chronic history of hyponatremia

, in the setting of acute on chronic respiratory failure, pneumonia versus 

pulmonary congestion.  Serum sodium dropped with normal saline, slightly 

improved to 121 this morning after normal saline was discontinued. 


Hyponatremia could be secondary to SIADH in the setting of respiratory illness, 

other possibilities such as adrenal insufficiency considering chronic steroid 

therapy remains however blood pressure has been elevated.  Random cortisol and 

TSH was normal.  No personal history of malignancy.





No history of chronic kidney disease on admission creatinine was 0.9, developed 

acute kidney injury over the course of hospital stay, creatinine peaked to 1.6, 

most likely Hemodynamically mediated,  which currently seems to be improving 

last creatinine was 1.3.  Has been voiding normally, Urinalysis with no 

proteinuria hematuria.





Recommendations


--extra dose of Lasix 40 mg IV x1 dose now


--3% saline 200 mL x1 dose now


--serum sodium in afternoon


--continue on salt tablet, fluid restriction to less than 1200 mL per day


--liberalize salt in diet


--increase protein intake


--serum sodium twice daily





Will follow

## 2018-03-28 VITALS
DIASTOLIC BLOOD PRESSURE: 85 MMHG | HEART RATE: 69 BPM | SYSTOLIC BLOOD PRESSURE: 125 MMHG | OXYGEN SATURATION: 90 % | TEMPERATURE: 97.34 F

## 2018-03-28 VITALS
SYSTOLIC BLOOD PRESSURE: 127 MMHG | OXYGEN SATURATION: 99 % | DIASTOLIC BLOOD PRESSURE: 75 MMHG | HEART RATE: 78 BPM | TEMPERATURE: 97.52 F

## 2018-03-28 VITALS — SYSTOLIC BLOOD PRESSURE: 137 MMHG | OXYGEN SATURATION: 95 % | DIASTOLIC BLOOD PRESSURE: 84 MMHG | HEART RATE: 66 BPM

## 2018-03-28 VITALS — HEART RATE: 85 BPM | OXYGEN SATURATION: 94 %

## 2018-03-28 VITALS — HEART RATE: 56 BPM

## 2018-03-28 LAB
BASOPHILS # BLD: 0.01 K/UL (ref 0–0.2)
BASOPHILS NFR BLD: 0 %
BUN SERPL-MCNC: 39 MG/DL (ref 7–18)
CALCIUM SERPL-MCNC: 8.6 MG/DL (ref 8.5–10.1)
CO2 SERPL-SCNC: 38 MMOL/L (ref 21–32)
CREAT SERPL-MCNC: 1.14 MG/DL (ref 0.6–1.2)
EOS ABS #: 0.01 K/UL (ref 0–0.5)
EOSINOPHIL NFR BLD AUTO: 214 K/UL (ref 130–400)
GLUCOSE SERPL-MCNC: 98 MG/DL (ref 70–99)
HCT VFR BLD CALC: 39.2 % (ref 37–47)
HGB BLD-MCNC: 13.4 G/DL (ref 12–16)
IG#: 0.27 K/UL (ref 0–0.02)
IMM GRANULOCYTES NFR BLD AUTO: 8.8 %
LYMPHOCYTES # BLD: 1.99 K/UL (ref 1.2–3.4)
MCH RBC QN AUTO: 32.6 PG (ref 25–34)
MCHC RBC AUTO-ENTMCNC: 34.2 G/DL (ref 32–36)
MCV RBC AUTO: 95.4 FL (ref 80–100)
MONO ABS #: 1.8 K/UL (ref 0.11–0.59)
MONOCYTES NFR BLD: 8 %
NEUT ABS #: 18.48 K/UL (ref 1.4–6.5)
NEUTROPHILS # BLD AUTO: 0 %
NEUTROPHILS NFR BLD AUTO: 82 %
PMV BLD AUTO: 9.2 FL (ref 7.4–10.4)
POTASSIUM SERPL-SCNC: 3.8 MMOL/L (ref 3.5–5.1)
RED CELL DISTRIBUTION WIDTH CV: 13.9 % (ref 11.5–14.5)
RED CELL DISTRIBUTION WIDTH SD: 48.2 FL (ref 36.4–46.3)
SODIUM SERPL-SCNC: 122 MMOL/L (ref 136–145)
WBC # BLD AUTO: 22.56 K/UL (ref 4.8–10.8)

## 2018-03-28 RX ADMIN — FUROSEMIDE SCH MG: 40 TABLET ORAL at 08:31

## 2018-03-28 RX ADMIN — PRAVASTATIN SODIUM SCH MG: 40 TABLET ORAL at 21:00

## 2018-03-28 RX ADMIN — SODIUM CHLORIDE TAB 1 GM SCH GM: 1 TAB at 16:52

## 2018-03-28 RX ADMIN — DOXYCYCLINE HYCLATE SCH MG: 100 CAPSULE, GELATIN COATED ORAL at 21:00

## 2018-03-28 RX ADMIN — INSULIN ASPART SCH UNITS: 100 INJECTION, SOLUTION INTRAVENOUS; SUBCUTANEOUS at 11:58

## 2018-03-28 RX ADMIN — INSULIN ASPART SCH UNITS: 100 INJECTION, SOLUTION INTRAVENOUS; SUBCUTANEOUS at 06:30

## 2018-03-28 RX ADMIN — Medication SCH TAB: at 21:00

## 2018-03-28 RX ADMIN — INSULIN ASPART SCH UNITS: 100 INJECTION, SOLUTION INTRAVENOUS; SUBCUTANEOUS at 16:56

## 2018-03-28 RX ADMIN — SODIUM CHLORIDE TAB 1 GM SCH GM: 1 TAB at 11:54

## 2018-03-28 RX ADMIN — RIVAROXABAN SCH MG: 15 TABLET, FILM COATED ORAL at 16:52

## 2018-03-28 RX ADMIN — DICLOFENAC SODIUM SCH APPLN: 10 GEL TOPICAL at 08:30

## 2018-03-28 RX ADMIN — DILTIAZEM HYDROCHLORIDE SCH MG: 60 TABLET, FILM COATED ORAL at 10:00

## 2018-03-28 RX ADMIN — Medication SCH TAB: at 08:31

## 2018-03-28 RX ADMIN — DILTIAZEM HYDROCHLORIDE SCH MG: 60 TABLET, FILM COATED ORAL at 04:50

## 2018-03-28 RX ADMIN — SODIUM CHLORIDE TAB 1 GM SCH GM: 1 TAB at 21:00

## 2018-03-28 RX ADMIN — DILTIAZEM HYDROCHLORIDE SCH MG: 60 TABLET, FILM COATED ORAL at 16:52

## 2018-03-28 RX ADMIN — DICLOFENAC SODIUM SCH APPLN: 10 GEL TOPICAL at 16:52

## 2018-03-28 RX ADMIN — PANTOPRAZOLE SCH MG: 40 TABLET, DELAYED RELEASE ORAL at 11:54

## 2018-03-28 RX ADMIN — DICLOFENAC SODIUM SCH APPLN: 10 GEL TOPICAL at 11:55

## 2018-03-28 RX ADMIN — SODIUM CHLORIDE TAB 1 GM SCH GM: 1 TAB at 08:30

## 2018-03-28 RX ADMIN — DILTIAZEM HYDROCHLORIDE SCH MG: 60 TABLET, FILM COATED ORAL at 22:00

## 2018-03-28 RX ADMIN — INSULIN ASPART SCH UNITS: 100 INJECTION, SOLUTION INTRAVENOUS; SUBCUTANEOUS at 21:00

## 2018-03-28 RX ADMIN — DOXYCYCLINE HYCLATE SCH MG: 100 CAPSULE, GELATIN COATED ORAL at 08:32

## 2018-03-28 RX ADMIN — INSULIN GLARGINE SCH UNITS: 100 INJECTION, SOLUTION SUBCUTANEOUS at 08:30

## 2018-03-28 NOTE — NEPHROLOGY PROGRESS NOTE
Nephrology Progress Note


Date of Service


Mar 28, 2018.





Chief Complaint


F/U for hyponatremia and acute kidney injury.





Subjective


Greta was seen and examined in her room this morning.  Overall feeling poorly 

and continues to have some shortness of breath.  She has been net negative. 

Serum sodium  remained low at 122.





Review of Systems


A complete review of systems was performed.  Pertinent positives are noted 

above. All other systems are negative.





Vital Signs





Last 8 Hrs








  Date Time  Temp Pulse Resp B/P (MAP) Pulse Ox O2 Delivery O2 Flow Rate FiO2


 


3/28/18 00:36  66 18 137/84 (101) 95 BiPAP  


 


3/28/18 00:00      BiPAP 4.0 











Last Recorded Weight


Weight (Kilograms):  71.200





Physical Exam


GENERAL:  Elderly female, AAA x 3, ill appearing, in mild distress.


NECK: Supple, no JVD.


RESPIRATORY:  Crackles bilaterally at bases


CARDIOVASCULAR: S1, S2 normal, rate rhythm regular.


EXTREMITY:  Trace bilateral lower extremity edema


NEURO: speech fluent.


PSYCHIATRY:  Depressed mood





Family History





Cancer


Diabetes mellitus


Gallbladder disease


Heart disease


Lung disease





Social History


Smoking Status:  Never smoker


Smokeless Tobacco Use:  No


Alcohol Use:  none


Drug Use:  none


Marital Status:  


Occupation:  retired





Laboratory Results


Past 24 Hours


3/27/18 12:01








3/27/18 18:49

















Test


  3/27/18


11:31 3/27/18


16:20 3/27/18


20:17 3/28/18


04:44


 


Bedside Glucose


  220 mg/dl


(70-90) 127 mg/dl


(70-90) 148 mg/dl


(70-90) 


 











Allergies


Coded Allergies:  


     Amoxicillin (Verified  Allergy, Unknown, UNKNOWN, 3/18/18)


     Clavulanic Acid (Verified  Allergy, Unknown, UNKNOWN, 3/18/18)


     Clindamycin (Verified  Allergy, Unknown, UNKNOWN, 3/18/18)


     Penicillins (Verified  Allergy, Unknown, UNKNOWN, 3/18/18)


     Sulfa Antibiotics (Verified  Adverse Reaction, Intermediate, NAUSEA, 3/18/

18)


     Aspirin (Verified  Adverse Reaction, Mild, GI SYMPTOMS, 3/18/18)


Uncoded Allergies:  


     ARTIFICIAL SWEETNER (Allergy, Mild, TONGUE SORE, 5/9/13)





Medications





Current Inpatient Medications








 Medications


  (Trade)  Dose


 Ordered  Sig/Walter


 Route  Start Time


 Stop Time Status Last Admin


Dose Admin


 


 Al Hydrox/Mg


 Hydrox/Simethicone


  (Maalox Max Susp)  15 ml  Q4H  PRN


 PO  3/19/18 00:15


 4/18/18 00:14   


 


 


 Magnesium


 Hydroxide


  (Milk Of


 Magnesia Susp)  30 ml  Q12H  PRN


 PO  3/19/18 00:15


 4/18/18 00:14  3/24/18 15:29


30 ML


 


 Ondansetron HCl


  (Zofran Inj)  4 mg  Q6H  PRN


 IV  3/19/18 00:15


 4/18/18 00:14  3/22/18 11:07


4 MG


 


 Nitroglycerin


  (Nitrostat Tab)  0.4 mg  UD  PRN


 SL  3/19/18 00:15


 4/18/18 00:14   


 


 


 Polyethylene


  (Miralax Powder


 Packet)  17 gm  DAILY  PRN


 PO  3/19/18 00:15


 4/18/18 00:14  3/24/18 15:29


17 GM


 


 Acetaminophen/


 Butalbital/


 Caffeine


  (Fioricet Tab)  1 tab  DAILY  PRN


 PO  3/19/18 00:15


 4/18/18 00:14   


 


 


 Calcium/Vitamin D


  (Caltrate Plus


 Tab)  1 tab  BID


 PO  3/19/18 09:00


 4/18/18 08:59  3/27/18 22:26


1 TAB


 


 Clonazepam


  (Klonopin Tab)  0.5 mg  HS  PRN


 PO  3/19/18 00:15


 4/18/18 00:14  3/22/18 03:06


0.5 MG


 


 Multivitamins/


 Minerals


  (Multivitamin W/


 Minerals Tab)  1 tab  BID


 PO  3/19/18 09:00


 4/18/18 08:59  3/27/18 22:25


1 TAB


 


 Pantoprazole


 Sodium


  (Protonix Tab)  40 mg  QDL


 PO  3/19/18 11:00


 4/18/18 10:59  3/27/18 12:01


40 MG


 


 Pravastatin Sodium


  (Pravachol Tab)  80 mg  QPM


 PO  3/19/18 21:00


 4/18/18 20:59  3/27/18 22:25


80 MG


 


 Miscellaneous


  (Iv Fluids


 Completed)  1 ea  PRN  PRN


 N/A  3/19/18 03:30


 3/19/19 03:29   


 


 


 Diclofenac Sodium


  (Voltaren 1% Top


 Gel)  1 appln  3XDQ4


 EXT  3/19/18 16:00


 4/18/18 15:59  3/27/18 17:11


1 APPLN


 


 Glucagon


  (Glucagon Inj)  1 mg  UD  PRN


 SQ  3/19/18 19:15


 4/18/18 19:14   


 


 


 Glucose


  (Glucose 40% Gel)  15-30


 GRAMS 15


 GRAMS...  UD  PRN


 PO  3/19/18 19:15


 4/18/18 19:14   


 


 


 Glucose


  (Glucose Chew


 Tab)  4-8


 Tablets 4


 Tabl...  UD  PRN


 PO  3/19/18 19:15


 4/18/18 19:14   


 


 


 Insulin Aspart


  (novoLOG ASPART)  **SLIDING


 SCALE**


 **If C...  ACHS


 SC  3/20/18 07:00


 4/19/18 06:59  3/27/18 17:38


2 UNITS


 


 Diltiazem HCl


  (Cardizem Tab)  60 mg  Q6H


 PO  3/20/18 04:00


 4/19/18 03:59  3/28/18 04:50


60 MG


 


 Atenolol


  (Tenormin Tab)  25 mg  QPM


 PO  3/21/18 21:00


 4/18/18 20:59  3/27/18 22:24


25 MG


 


 Rivaroxaban


  (Xarelto Tab)  15 mg  QDD


 PO  3/21/18 16:45


 4/20/18 16:44  3/27/18 17:21


15 MG


 


 Doxycycline


 Hyclate


  (Vibramycin Cap)  100 mg  BID


 PO  3/22/18 21:00


 3/29/18 20:59  3/27/18 22:24


100 MG


 


 Acetaminophen


  (Tylenol Tab)  650 mg  Q6H  PRN


 PO  3/22/18 10:45


 4/21/18 10:44  3/25/18 17:13


650 MG


 


 Levofloxacin


  (Consult)  1 ea  UD  PRN


 N/A  3/22/18 12:15


 4/21/18 12:14   


 


 


 Sodium Chloride


  (Sodium Chloride


 Tab)  1 gm  QID


 PO  3/24/18 17:00


 4/23/18 16:59  3/27/18 22:25


1 GM


 


 Insulin Glargine


  (Lantus Solostar


 Pen)  10 units  QAM


 SC  3/26/18 09:00


 4/25/18 08:59  3/27/18 08:10


10 UNITS


 


 Furosemide


  (Lasix Tab)  40 mg  QAM


 PO  3/26/18 10:30


 4/25/18 10:29  3/27/18 08:04


40 MG


 


 Levofloxacin


  (Levaquin Tab)  750 mg  Q2D@1100


 PO  3/27/18 11:00


 4/3/18 10:59  3/27/18 11:25


750 MG


 


 Acetazolamide


  (Diamox Tab)  250 mg  BID


 PO  3/27/18 09:00


 3/30/18 08:59  3/27/18 22:24


250 MG


 


 Prednisone


  (PredniSONE TAB)  50 mg  DAILY


 PO  3/28/18 09:00


 3/31/18 08:59   


 











Impression





(1) Hyponatremia


(2) Acute kidney injury


(3) Acute on chronic respiratory failure with hypoxia and hypercapnia


(4) Hypertension


78-year-old female with acute hyponatremia with chronic history of hyponatremia

, in the setting of acute on chronic respiratory failure, pneumonia versus 

pulmonary congestion.  Serum sodium dropped with normal saline, slightly 

improved to 121 this morning after normal saline was discontinued. 


Hyponatremia could be secondary to SIADH in the setting of respiratory illness, 

other possibilities such as adrenal insufficiency considering chronic steroid 

therapy remains however blood pressure has been elevated.  Random cortisol and 

TSH was normal.  No personal history of malignancy.





No history of chronic kidney disease on admission creatinine was 0.9, developed 

acute kidney injury over the course of hospital stay, creatinine peaked to 1.6, 

most likely Hemodynamically mediated,  which currently seems to be improving 

last creatinine was 1.3.  Has been voiding normally, Urinalysis with no 

proteinuria hematuria.





Recommendations


--Tolvaptan 15 mg this am


--serum sodium in afternoon


--continue on salt tablet.


--liberalize salt in diet


--increase protein intake


--serum sodium twice daily





Will follow

## 2018-03-28 NOTE — FAMILY MEDICINE PROGRESS NOTE
Progress Note


Date of Service


Mar 28, 2018.





Subjective


Pt evaluation today including:  conversation w/ patient, physical exam, chart 

review, lab review, review of studies, review of inpatient medication list


Pain:  denies


PO Intake:  adequate


Voiding:  washburn catheter in place


No acute events over night, Patient reports cough but denies Chest pain, SOB, 

fevers, chills.





   Additional Comments:


   Constitutional:  No fever, No chills


   Respiratory:  No shortness of breath, + cough, No wheezing


   Cardiovascular:  No chest pain, No edema, No palpitations


   Abdomen:  No pain, No nausea, No vomiting, No diarrhea


   Musculoskeletal:  No swelling, No calf pain


   Female :  No dysuria





Medications





Current Inpatient Medications








 Medications


  (Trade)  Dose


 Ordered  Sig/Walter


 Route  Start Time


 Stop Time Status Last Admin


Dose Admin


 


 Al Hydrox/Mg


 Hydrox/Simethicone


  (Maalox Max Susp)  15 ml  Q4H  PRN


 PO  3/19/18 00:15


 4/18/18 00:14   


 


 


 Magnesium


 Hydroxide


  (Milk Of


 Magnesia Susp)  30 ml  Q12H  PRN


 PO  3/19/18 00:15


 4/18/18 00:14  3/24/18 15:29


30 ML


 


 Ondansetron HCl


  (Zofran Inj)  4 mg  Q6H  PRN


 IV  3/19/18 00:15


 4/18/18 00:14  3/22/18 11:07


4 MG


 


 Nitroglycerin


  (Nitrostat Tab)  0.4 mg  UD  PRN


 SL  3/19/18 00:15


 4/18/18 00:14   


 


 


 Polyethylene


  (Miralax Powder


 Packet)  17 gm  DAILY  PRN


 PO  3/19/18 00:15


 4/18/18 00:14  3/24/18 15:29


17 GM


 


 Acetaminophen/


 Butalbital/


 Caffeine


  (Fioricet Tab)  1 tab  DAILY  PRN


 PO  3/19/18 00:15


 4/18/18 00:14   


 


 


 Calcium/Vitamin D


  (Caltrate Plus


 Tab)  1 tab  BID


 PO  3/19/18 09:00


 4/18/18 08:59  3/28/18 08:31


1 TAB


 


 Clonazepam


  (Klonopin Tab)  0.5 mg  HS  PRN


 PO  3/19/18 00:15


 4/18/18 00:14  3/22/18 03:06


0.5 MG


 


 Multivitamins/


 Minerals


  (Multivitamin W/


 Minerals Tab)  1 tab  BID


 PO  3/19/18 09:00


 4/18/18 08:59  3/28/18 08:31


1 TAB


 


 Pantoprazole


 Sodium


  (Protonix Tab)  40 mg  QDL


 PO  3/19/18 11:00


 4/18/18 10:59  3/27/18 12:01


40 MG


 


 Pravastatin Sodium


  (Pravachol Tab)  80 mg  QPM


 PO  3/19/18 21:00


 4/18/18 20:59  3/27/18 22:25


80 MG


 


 Miscellaneous


  (Iv Fluids


 Completed)  1 ea  PRN  PRN


 N/A  3/19/18 03:30


 3/19/19 03:29   


 


 


 Diclofenac Sodium


  (Voltaren 1% Top


 Gel)  1 appln  3XDQ4


 EXT  3/19/18 16:00


 4/18/18 15:59  3/28/18 08:30


1 APPLN


 


 Glucagon


  (Glucagon Inj)  1 mg  UD  PRN


 SQ  3/19/18 19:15


 4/18/18 19:14   


 


 


 Glucose


  (Glucose 40% Gel)  15-30


 GRAMS 15


 GRAMS...  UD  PRN


 PO  3/19/18 19:15


 4/18/18 19:14   


 


 


 Glucose


  (Glucose Chew


 Tab)  4-8


 Tablets 4


 Tabl...  UD  PRN


 PO  3/19/18 19:15


 4/18/18 19:14   


 


 


 Insulin Aspart


  (novoLOG ASPART)  **SLIDING


 SCALE**


 **If C...  ACHS


 SC  3/20/18 07:00


 4/19/18 06:59  3/27/18 17:38


2 UNITS


 


 Diltiazem HCl


  (Cardizem Tab)  60 mg  Q6H


 PO  3/20/18 04:00


 4/19/18 03:59  3/28/18 04:50


60 MG


 


 Atenolol


  (Tenormin Tab)  25 mg  QPM


 PO  3/21/18 21:00


 4/18/18 20:59  3/27/18 22:24


25 MG


 


 Rivaroxaban


  (Xarelto Tab)  15 mg  QDD


 PO  3/21/18 16:45


 4/20/18 16:44  3/27/18 17:21


15 MG


 


 Doxycycline


 Hyclate


  (Vibramycin Cap)  100 mg  BID


 PO  3/22/18 21:00


 3/29/18 20:59  3/28/18 08:32


100 MG


 


 Acetaminophen


  (Tylenol Tab)  650 mg  Q6H  PRN


 PO  3/22/18 10:45


 4/21/18 10:44  3/25/18 17:13


650 MG


 


 Levofloxacin


  (Consult)  1 ea  UD  PRN


 N/A  3/22/18 12:15


 4/21/18 12:14   


 


 


 Sodium Chloride


  (Sodium Chloride


 Tab)  1 gm  QID


 PO  3/24/18 17:00


 4/23/18 16:59  3/28/18 08:30


1 GM


 


 Insulin Glargine


  (Lantus Solostar


 Pen)  10 units  QAM


 SC  3/26/18 09:00


 4/25/18 08:59  3/28/18 08:30


10 UNITS


 


 Furosemide


  (Lasix Tab)  40 mg  QAM


 PO  3/26/18 10:30


 4/25/18 10:29  3/28/18 08:31


40 MG


 


 Levofloxacin


  (Levaquin Tab)  750 mg  Q2D@1100


 PO  3/27/18 11:00


 4/3/18 10:59  3/27/18 11:25


750 MG


 


 Acetazolamide


  (Diamox Tab)  250 mg  BID


 PO  3/27/18 09:00


 3/30/18 08:59  3/28/18 08:31


250 MG


 


 Prednisone


  (PredniSONE TAB)  50 mg  DAILY


 PO  3/28/18 09:00


 3/31/18 08:59  3/28/18 08:31


50 MG


 


 Tolvaptan


  (Samsca Tab)  15 mg  QAM


 PO  3/29/18 09:00


 4/28/18 08:59   


 











Objective


Vital Signs











  Date Time  Temp Pulse Resp B/P (MAP) Pulse Ox O2 Delivery O2 Flow Rate FiO2


 


3/28/18 00:36  66 18 137/84 (101) 95 BiPAP  


 


3/28/18 00:00      BiPAP 4.0 


 


3/27/18 22:36  68   98  5.0 


 


3/27/18 16:04 36.5 80 18 134/93 (107) 99 Nasal Cannula 4.0 


 


3/27/18 16:00      Nasal Cannula 4.0 


 


3/27/18 08:00      Nasal Cannula 4.0 











Physical Exam


Notes:


GENERAL: alert,  no distress 


EYE EXAM: normal conjunctiva, PERRL and EOM's grossly intact


NECK: supple, no nuchal rigidity, no adenopathy, non-tender


LUNGS: Bilateral Rhonchi, No wheeze , No respiratory distress


HEART: S1 normal and S2 normal 


ABDOMEN: abdomen soft, non-tender, normo-active bowel sounds, no masses, no 

rebound or guarding. 


UPPER EXTREMITIES: upper extremities are grossly normal. 


LOWER EXTREMITIES: No pitting edema.


NEURO EXAM: Normal sensorium, cranial nerves II-XII grossly intact, normal 

speech





Laboratory Results





Results Past 24 Hours








Test


  3/27/18


11:31 3/27/18


12:01 3/27/18


16:20 3/27/18


18:49 Range/Units


 


 


Bedside Glucose 220  127  70-90  mg/dl


 


Sodium Level  123  122 136-145  mmol/L


 


Test


  3/27/18


20:17 3/28/18


04:44 3/28/18


07:37 3/28/18


07:51 Range/Units


 


 


Bedside Glucose 148  104  70-90  mg/dl











Assessment and Plan





Acute hypoxic/hypercapnic respiratory failure: in the setting of Pneumonia and  

chronic interstitial lung disease


- Chest CT revealed interval worsening of peribronchovascular consolidation 


- MRSA positive, influenza negative


- Cefepime reinitiated  3/25 due to clinical worsening. 


currently appears to be improving clinically.


- Leukocytosis likely steroid related, Continue to monitor.


- Continue  Doxy, Levaquin, DC'd Cefepime 3/27


- Elevated white ct potentially due to steroid dosing vs Pneumonia


- Gradual Steroids taper, currently  at 50 mg daily 


- Maintain on BIPAP at night, NC 4L during the day


- Will need Bipap on discharge





Hyponatremia/LON


- chronically hyponatremic with possible SIADH acute component


 -improved Na


- ?SIADH +/- mild heart failure


- Continue Sodium tablets, fluid restriction


- Urine osmolality 294 and serum osmolality 277


- LON resolved 


- Monitor BMP





NSTEMI:


-allergic to aspirin


-Continue pravastatin


-Echo : .EF 60-65%.  No regional wall motion abnormalities.  Mild concentric LVH

, Severe LA dilation, severe TR, RVSP- 37mmHg


  


Atrial fibrillation with rapid ventricular rate:


- Hx of paroxysmal atrial fib 


- rate controlled


- Continue Cardizem and Atenolol


- Continue Xarelto qpm





Hypertension :


-Continue atenolol and diltiazem


 


Hypervolemia:


- Echo as above 


- Lasix remains held 





Steroid-induced hyperglycemia


-Monitor blood sugar





RA 


- Placed on steroids


- Continue Cimzia as output when recovers from PNM





DVT prophylaxis: 


Rivaroxaban





DNR





Disp: [facility] referral made


Continued [facility] stay due to:  home environment unsafe for pt


Discharge planning:  rehab hospital





Resident Tracking


Resident Involvement:  Resident Care Provided


Care Provided:  Adult Hospital Medicine





Assessment/Plan


Resident Physician Supervision Note:





I was present with Dr. Stuart during the history and exam. I discussed the case 

with the resident and agree with the findings and plan as documented in the 

note.  Any exceptions or clarifications are listed here.





Pt states that breathing has returned to previous baseline on home 3L O2 by NC 

with resolution of cough. Tolerating PO abx regimen despite elevated WBC, which 

may be 2/2 steroid therapy.





Called by nursing regarding patient refusing medications and stating that she 

wants to die. Referring to the discussion yesterday, patient states that her 

true preference would be to discontinue all therapy and support. After 

reviewing the options and limitations of medical care, she is in agreement to 

transition to hospice care following discharge from the hospital, and refuses 

rehabilitation services to that end. Will d/w case management ASAP.

## 2018-03-29 VITALS — OXYGEN SATURATION: 98 % | HEART RATE: 53 BPM

## 2018-03-29 VITALS
HEART RATE: 62 BPM | TEMPERATURE: 97.7 F | DIASTOLIC BLOOD PRESSURE: 91 MMHG | OXYGEN SATURATION: 95 % | SYSTOLIC BLOOD PRESSURE: 165 MMHG

## 2018-03-29 VITALS
OXYGEN SATURATION: 100 % | DIASTOLIC BLOOD PRESSURE: 81 MMHG | HEART RATE: 75 BPM | SYSTOLIC BLOOD PRESSURE: 112 MMHG | TEMPERATURE: 97.52 F

## 2018-03-29 VITALS
OXYGEN SATURATION: 93 % | HEART RATE: 60 BPM | TEMPERATURE: 97.88 F | DIASTOLIC BLOOD PRESSURE: 65 MMHG | SYSTOLIC BLOOD PRESSURE: 113 MMHG

## 2018-03-29 VITALS — OXYGEN SATURATION: 98 %

## 2018-03-29 VITALS
TEMPERATURE: 97.52 F | HEART RATE: 67 BPM | SYSTOLIC BLOOD PRESSURE: 134 MMHG | OXYGEN SATURATION: 98 % | DIASTOLIC BLOOD PRESSURE: 84 MMHG

## 2018-03-29 VITALS — SYSTOLIC BLOOD PRESSURE: 132 MMHG | DIASTOLIC BLOOD PRESSURE: 92 MMHG | OXYGEN SATURATION: 92 % | HEART RATE: 80 BPM

## 2018-03-29 LAB
ALBUMIN SERPL-MCNC: 2.2 GM/DL (ref 3.4–5)
ALP SERPL-CCNC: 170 U/L (ref 45–117)
ALT SERPL-CCNC: 57 U/L (ref 12–78)
AST SERPL-CCNC: 22 U/L (ref 15–37)
BASOPHILS # BLD: 0.02 K/UL (ref 0–0.2)
BASOPHILS NFR BLD: 0.1 %
BUN SERPL-MCNC: 40 MG/DL (ref 7–18)
BUN SERPL-MCNC: 43 MG/DL (ref 7–18)
CALCIUM SERPL-MCNC: 8.8 MG/DL (ref 8.5–10.1)
CALCIUM SERPL-MCNC: 9.5 MG/DL (ref 8.5–10.1)
CO2 SERPL-SCNC: 39 MMOL/L (ref 21–32)
CO2 SERPL-SCNC: 41 MMOL/L (ref 21–32)
CREAT SERPL-MCNC: 1.18 MG/DL (ref 0.6–1.2)
CREAT SERPL-MCNC: 1.24 MG/DL (ref 0.6–1.2)
EOS ABS #: 0.01 K/UL (ref 0–0.5)
EOSINOPHIL NFR BLD AUTO: 218 K/UL (ref 130–400)
GLUCOSE SERPL-MCNC: 134 MG/DL (ref 70–99)
GLUCOSE SERPL-MCNC: 89 MG/DL (ref 70–99)
HCT VFR BLD CALC: 40.8 % (ref 37–47)
HGB BLD-MCNC: 13.7 G/DL (ref 12–16)
IG#: 0.14 K/UL (ref 0–0.02)
IMM GRANULOCYTES NFR BLD AUTO: 4 %
LYMPHOCYTES # BLD: 0.89 K/UL (ref 1.2–3.4)
MCH RBC QN AUTO: 32.5 PG (ref 25–34)
MCHC RBC AUTO-ENTMCNC: 33.6 G/DL (ref 32–36)
MCV RBC AUTO: 96.9 FL (ref 80–100)
MONO ABS #: 1.51 K/UL (ref 0.11–0.59)
MONOCYTES NFR BLD: 6.8 %
NEUT ABS #: 19.63 K/UL (ref 1.4–6.5)
NEUTROPHILS # BLD AUTO: 0 %
NEUTROPHILS NFR BLD AUTO: 88.5 %
PMV BLD AUTO: 9.6 FL (ref 7.4–10.4)
POTASSIUM SERPL-SCNC: 3.5 MMOL/L (ref 3.5–5.1)
POTASSIUM SERPL-SCNC: 3.6 MMOL/L (ref 3.5–5.1)
PROT SERPL-MCNC: 6 GM/DL (ref 6.4–8.2)
RED CELL DISTRIBUTION WIDTH CV: 14.2 % (ref 11.5–14.5)
RED CELL DISTRIBUTION WIDTH SD: 50.8 FL (ref 36.4–46.3)
SODIUM SERPL-SCNC: 128 MMOL/L (ref 136–145)
SODIUM SERPL-SCNC: 128 MMOL/L (ref 136–145)
WBC # BLD AUTO: 22.2 K/UL (ref 4.8–10.8)

## 2018-03-29 RX ADMIN — DILTIAZEM HYDROCHLORIDE SCH MG: 60 TABLET, FILM COATED ORAL at 10:32

## 2018-03-29 RX ADMIN — INSULIN ASPART SCH UNITS: 100 INJECTION, SOLUTION INTRAVENOUS; SUBCUTANEOUS at 08:01

## 2018-03-29 RX ADMIN — INSULIN ASPART SCH UNITS: 100 INJECTION, SOLUTION INTRAVENOUS; SUBCUTANEOUS at 16:59

## 2018-03-29 RX ADMIN — DICLOFENAC SODIUM SCH APPLN: 10 GEL TOPICAL at 16:54

## 2018-03-29 RX ADMIN — SODIUM CHLORIDE TAB 1 GM SCH GM: 1 TAB at 16:54

## 2018-03-29 RX ADMIN — TOLVAPTAN SCH MG: 15 TABLET ORAL at 07:53

## 2018-03-29 RX ADMIN — Medication SCH TAB: at 07:52

## 2018-03-29 RX ADMIN — DILTIAZEM HYDROCHLORIDE SCH MG: 60 TABLET, FILM COATED ORAL at 16:54

## 2018-03-29 RX ADMIN — FUROSEMIDE SCH MG: 40 TABLET ORAL at 07:52

## 2018-03-29 RX ADMIN — DILTIAZEM HYDROCHLORIDE SCH MG: 60 TABLET, FILM COATED ORAL at 03:46

## 2018-03-29 RX ADMIN — Medication SCH TAB: at 20:19

## 2018-03-29 RX ADMIN — LEVOFLOXACIN SCH MG: 750 TABLET, FILM COATED ORAL at 10:32

## 2018-03-29 RX ADMIN — INSULIN GLARGINE SCH UNITS: 100 INJECTION, SOLUTION SUBCUTANEOUS at 08:01

## 2018-03-29 RX ADMIN — PANTOPRAZOLE SCH MG: 40 TABLET, DELAYED RELEASE ORAL at 12:17

## 2018-03-29 RX ADMIN — SODIUM CHLORIDE TAB 1 GM SCH GM: 1 TAB at 20:18

## 2018-03-29 RX ADMIN — SODIUM CHLORIDE TAB 1 GM SCH GM: 1 TAB at 07:54

## 2018-03-29 RX ADMIN — INSULIN ASPART SCH UNITS: 100 INJECTION, SOLUTION INTRAVENOUS; SUBCUTANEOUS at 20:14

## 2018-03-29 RX ADMIN — SODIUM CHLORIDE TAB 1 GM SCH GM: 1 TAB at 12:37

## 2018-03-29 RX ADMIN — ACETAMINOPHEN PRN MG: 325 TABLET ORAL at 15:26

## 2018-03-29 RX ADMIN — POLYETHYLENE GLYCOL 3350 PRN GM: 17 POWDER, FOR SOLUTION ORAL at 15:03

## 2018-03-29 RX ADMIN — DOXYCYCLINE HYCLATE SCH MG: 100 CAPSULE, GELATIN COATED ORAL at 07:54

## 2018-03-29 RX ADMIN — DICLOFENAC SODIUM SCH APPLN: 10 GEL TOPICAL at 08:00

## 2018-03-29 RX ADMIN — PRAVASTATIN SODIUM SCH MG: 40 TABLET ORAL at 20:19

## 2018-03-29 RX ADMIN — DICLOFENAC SODIUM SCH APPLN: 10 GEL TOPICAL at 12:17

## 2018-03-29 RX ADMIN — Medication SCH TAB: at 07:53

## 2018-03-29 RX ADMIN — RIVAROXABAN SCH MG: 15 TABLET, FILM COATED ORAL at 16:54

## 2018-03-29 RX ADMIN — INSULIN ASPART SCH UNITS: 100 INJECTION, SOLUTION INTRAVENOUS; SUBCUTANEOUS at 12:37

## 2018-03-29 RX ADMIN — DILTIAZEM HYDROCHLORIDE SCH MG: 60 TABLET, FILM COATED ORAL at 21:35

## 2018-03-29 NOTE — NEPHROLOGY PROGRESS NOTE
Nephrology Progress Note


Date of Service


Mar 29, 2018.





Chief Complaint


F/U for hyponatremia and acute kidney injury.





Subjective


Greta was seen and examined in her room this morning.  Overall feeling poorly. 

She has been net negative. Serum sodium improved to 128.





Review of Systems


A complete review of systems was performed.  Pertinent positives are noted 

above. All other systems are negative.





Vital Signs





Last 8 Hrs








  Date Time  Temp Pulse Resp B/P (MAP) Pulse Ox O2 Delivery O2 Flow Rate FiO2


 


3/29/18 07:10 36.4 67 20 134/84 (101) 98 Nasal Cannula 2.0 











Last Recorded Weight


Weight (Kilograms):  71.200





Physical Exam


GENERAL:  Elderly female, AAA x 3, ill appearing, in mild distress.


NECK: Supple, no JVD.


RESPIRATORY:  Crackles bilaterally at bases


CARDIOVASCULAR: S1, S2 normal, rate rhythm regular.


EXTREMITY:  Trace bilateral lower extremity edema


NEURO: speech fluent.


PSYCHIATRY:  Depressed mood





Family History





Cancer


Diabetes mellitus


Gallbladder disease


Heart disease


Lung disease





Social History


Smoking Status:  Never smoker


Smokeless Tobacco Use:  No


Alcohol Use:  none


Drug Use:  none


Marital Status:  


Occupation:  retired





Laboratory Results


Past 24 Hours


3/28/18 13:49

















Test


  3/28/18


11:34 3/28/18


16:46 3/28/18


20:16 3/29/18


07:22


 


Bedside Glucose


  106 mg/dl


(70-90) 161 mg/dl


(70-90) 113 mg/dl


(70-90) 


 


 


Test


  3/29/18


07:26 


  


  


 


 


Bedside Glucose


  89 mg/dl


(70-90) 


  


  


 











Allergies


Coded Allergies:  


     Amoxicillin (Verified  Allergy, Unknown, UNKNOWN, 3/18/18)


     Clavulanic Acid (Verified  Allergy, Unknown, UNKNOWN, 3/18/18)


     Clindamycin (Verified  Allergy, Unknown, UNKNOWN, 3/18/18)


     Penicillins (Verified  Allergy, Unknown, UNKNOWN, 3/18/18)


     Sulfa Antibiotics (Verified  Adverse Reaction, Intermediate, NAUSEA, 3/18/

18)


     Aspirin (Verified  Adverse Reaction, Mild, GI SYMPTOMS, 3/18/18)


Uncoded Allergies:  


     ARTIFICIAL SWEETNER (Allergy, Mild, TONGUE SORE, 5/9/13)





Medications





Current Inpatient Medications








 Medications


  (Trade)  Dose


 Ordered  Sig/Walter


 Route  Start Time


 Stop Time Status Last Admin


Dose Admin


 


 Al Hydrox/Mg


 Hydrox/Simethicone


  (Maalox Max Susp)  15 ml  Q4H  PRN


 PO  3/19/18 00:15


 4/18/18 00:14   


 


 


 Magnesium


 Hydroxide


  (Milk Of


 Magnesia Susp)  30 ml  Q12H  PRN


 PO  3/19/18 00:15


 4/18/18 00:14  3/24/18 15:29


30 ML


 


 Ondansetron HCl


  (Zofran Inj)  4 mg  Q6H  PRN


 IV  3/19/18 00:15


 4/18/18 00:14  3/22/18 11:07


4 MG


 


 Nitroglycerin


  (Nitrostat Tab)  0.4 mg  UD  PRN


 SL  3/19/18 00:15


 4/18/18 00:14   


 


 


 Polyethylene


  (Miralax Powder


 Packet)  17 gm  DAILY  PRN


 PO  3/19/18 00:15


 4/18/18 00:14  3/24/18 15:29


17 GM


 


 Acetaminophen/


 Butalbital/


 Caffeine


  (Fioricet Tab)  1 tab  DAILY  PRN


 PO  3/19/18 00:15


 4/18/18 00:14   


 


 


 Calcium/Vitamin D


  (Caltrate Plus


 Tab)  1 tab  BID


 PO  3/19/18 09:00


 4/18/18 08:59  3/29/18 07:53


1 TAB


 


 Clonazepam


  (Klonopin Tab)  0.5 mg  HS  PRN


 PO  3/19/18 00:15


 4/18/18 00:14  3/22/18 03:06


0.5 MG


 


 Multivitamins/


 Minerals


  (Multivitamin W/


 Minerals Tab)  1 tab  BID


 PO  3/19/18 09:00


 4/18/18 08:59  3/29/18 07:52


1 TAB


 


 Pantoprazole


 Sodium


  (Protonix Tab)  40 mg  QDL


 PO  3/19/18 11:00


 4/18/18 10:59  3/28/18 11:54


40 MG


 


 Pravastatin Sodium


  (Pravachol Tab)  80 mg  QPM


 PO  3/19/18 21:00


 4/18/18 20:59  3/27/18 22:25


80 MG


 


 Miscellaneous


  (Iv Fluids


 Completed)  1 ea  PRN  PRN


 N/A  3/19/18 03:30


 3/19/19 03:29   


 


 


 Diclofenac Sodium


  (Voltaren 1% Top


 Gel)  1 appln  3XDQ4


 EXT  3/19/18 16:00


 4/18/18 15:59  3/28/18 16:52


1 APPLN


 


 Glucagon


  (Glucagon Inj)  1 mg  UD  PRN


 SQ  3/19/18 19:15


 4/18/18 19:14   


 


 


 Glucose


  (Glucose 40% Gel)  15-30


 GRAMS 15


 GRAMS...  UD  PRN


 PO  3/19/18 19:15


 4/18/18 19:14   


 


 


 Glucose


  (Glucose Chew


 Tab)  4-8


 Tablets 4


 Tabl...  UD  PRN


 PO  3/19/18 19:15


 4/18/18 19:14   


 


 


 Insulin Aspart


  (novoLOG ASPART)  **SLIDING


 SCALE**


 **If C...  ACHS


 SC  3/20/18 07:00


 4/19/18 06:59  3/29/18 08:01


3 UNITS


 


 Diltiazem HCl


  (Cardizem Tab)  60 mg  Q6H


 PO  3/20/18 04:00


 4/19/18 03:59  3/29/18 03:46


60 MG


 


 Atenolol


  (Tenormin Tab)  25 mg  QPM


 PO  3/21/18 21:00


 4/18/18 20:59  3/27/18 22:24


25 MG


 


 Rivaroxaban


  (Xarelto Tab)  15 mg  QDD


 PO  3/21/18 16:45


 4/20/18 16:44  3/28/18 16:52


15 MG


 


 Doxycycline


 Hyclate


  (Vibramycin Cap)  100 mg  BID


 PO  3/22/18 21:00


 3/29/18 20:59  3/29/18 07:54


100 MG


 


 Acetaminophen


  (Tylenol Tab)  650 mg  Q6H  PRN


 PO  3/22/18 10:45


 4/21/18 10:44  3/25/18 17:13


650 MG


 


 Levofloxacin


  (Consult)  1 ea  UD  PRN


 N/A  3/22/18 12:15


 4/21/18 12:14   


 


 


 Sodium Chloride


  (Sodium Chloride


 Tab)  1 gm  QID


 PO  3/24/18 17:00


 4/23/18 16:59  3/29/18 07:54


1 GM


 


 Insulin Glargine


  (Lantus Solostar


 Pen)  10 units  QAM


 SC  3/26/18 09:00


 4/25/18 08:59  3/29/18 08:01


10 UNITS


 


 Furosemide


  (Lasix Tab)  40 mg  QAM


 PO  3/26/18 10:30


 4/25/18 10:29  3/29/18 07:52


40 MG


 


 Levofloxacin


  (Levaquin Tab)  750 mg  Q2D@1100


 PO  3/27/18 11:00


 4/3/18 10:59  3/27/18 11:25


750 MG


 


 Acetazolamide


  (Diamox Tab)  250 mg  BID


 PO  3/27/18 09:00


 3/30/18 08:59  3/29/18 07:51


250 MG


 


 Prednisone


  (PredniSONE TAB)  50 mg  DAILY


 PO  3/28/18 09:00


 3/31/18 08:59  3/29/18 07:52


50 MG


 


 Tolvaptan


  (Samsca Tab)  15 mg  QAM


 PO  3/29/18 09:00


 4/28/18 08:59  3/29/18 07:53


15 MG











Impression





(1) Hyponatremia


(2) Acute kidney injury


(3) Acute on chronic respiratory failure with hypoxia and hypercapnia


(4) Hypertension


78-year-old female with acute hyponatremia with chronic history of hyponatremia

, in the setting of acute on chronic respiratory failure, pneumonia versus 

pulmonary congestion.  Serum sodium dropped with normal saline, slightly 

improved to 121 this morning after normal saline was discontinued. 


Hyponatremia could be secondary to SIADH in the setting of respiratory illness, 

other possibilities such as adrenal insufficiency considering chronic steroid 

therapy remains however blood pressure has been elevated.  Random cortisol and 

TSH was normal.  No personal history of malignancy.





No history of chronic kidney disease on admission creatinine was 0.9, developed 

acute kidney injury over the course of hospital stay, creatinine peaked to 1.6, 

most likely Hemodynamically mediated,  which currently seems to be improving 

last creatinine was 1.3.  Has been voiding normally, Urinalysis with no 

proteinuria hematuria.





Recommendations





--serum sodium in afternoon


--consider another dose of tolvaptan if any further drop in to keep serum 

sodium below 130 today


--continue on salt tablet.


--liberalize salt in diet


--increase protein intake


--serum sodium twice daily





Will follow

## 2018-03-29 NOTE — FAMILY MEDICINE PROGRESS NOTE
Progress Note


Date of Service


Mar 29, 2018.





Subjective


Pt evaluation today including:  conversation w/ patient, physical exam, chart 

review, lab review, review of studies, review of inpatient medication list


Pain:  denies


PO Intake:  adequate


Voiding:  no voiding problems


Constitutional:  No fever, No chills


Respiratory:  No shortness of breath, No cough, No wheezing


Cardiovascular:  No chest pain, No edema, No palpitations


Abdomen:  No pain, No nausea, No vomiting, No diarrhea


Musculoskeletal:  No swelling, No calf pain


Female :  No dysuria No hematuria





Medications





Current Inpatient Medications








 Medications


  (Trade)  Dose


 Ordered  Sig/Walter


 Route  Start Time


 Stop Time Status Last Admin


Dose Admin


 


 Al Hydrox/Mg


 Hydrox/Simethicone


  (Maalox Max Susp)  15 ml  Q4H  PRN


 PO  3/19/18 00:15


 4/18/18 00:14   


 


 


 Magnesium


 Hydroxide


  (Milk Of


 Magnesia Susp)  30 ml  Q12H  PRN


 PO  3/19/18 00:15


 4/18/18 00:14  3/24/18 15:29


30 ML


 


 Ondansetron HCl


  (Zofran Inj)  4 mg  Q6H  PRN


 IV  3/19/18 00:15


 4/18/18 00:14  3/22/18 11:07


4 MG


 


 Nitroglycerin


  (Nitrostat Tab)  0.4 mg  UD  PRN


 SL  3/19/18 00:15


 4/18/18 00:14   


 


 


 Polyethylene


  (Miralax Powder


 Packet)  17 gm  DAILY  PRN


 PO  3/19/18 00:15


 4/18/18 00:14  3/29/18 15:03


17 GM


 


 Acetaminophen/


 Butalbital/


 Caffeine


  (Fioricet Tab)  1 tab  DAILY  PRN


 PO  3/19/18 00:15


 4/18/18 00:14   


 


 


 Calcium/Vitamin D


  (Caltrate Plus


 Tab)  1 tab  BID


 PO  3/19/18 09:00


 4/18/18 08:59  3/29/18 07:53


1 TAB


 


 Clonazepam


  (Klonopin Tab)  0.5 mg  HS  PRN


 PO  3/19/18 00:15


 4/18/18 00:14  3/22/18 03:06


0.5 MG


 


 Multivitamins/


 Minerals


  (Multivitamin W/


 Minerals Tab)  1 tab  BID


 PO  3/19/18 09:00


 4/18/18 08:59  3/29/18 07:52


1 TAB


 


 Pantoprazole


 Sodium


  (Protonix Tab)  40 mg  QDL


 PO  3/19/18 11:00


 4/18/18 10:59  3/29/18 12:17


40 MG


 


 Pravastatin Sodium


  (Pravachol Tab)  80 mg  QPM


 PO  3/19/18 21:00


 4/18/18 20:59  3/27/18 22:25


80 MG


 


 Miscellaneous


  (Iv Fluids


 Completed)  1 ea  PRN  PRN


 N/A  3/19/18 03:30


 3/19/19 03:29   


 


 


 Diclofenac Sodium


  (Voltaren 1% Top


 Gel)  1 appln  [id number]


 EXT  3/19/18 16:00


 4/18/18 15:59  3/29/18 12:17


1 APPLN


 


 Glucagon


  (Glucagon Inj)  1 mg  UD  PRN


 SQ  3/19/18 19:15


 4/18/18 19:14   


 


 


 Glucose


  (Glucose 40% Gel)  15-30


 GRAMS 15


 GRAMS...  UD  PRN


 PO  3/19/18 19:15


 4/18/18 19:14   


 


 


 Glucose


  (Glucose Chew


 Tab)  4-8


 Tablets 4


 Tabl...  UD  PRN


 PO  3/19/18 19:15


 4/18/18 19:14   


 


 


 Insulin Aspart


  (novoLOG ASPART)  **SLIDING


 SCALE**


 **If C...  ACHS


 SC  3/20/18 07:00


 4/19/18 06:59  3/29/18 12:37


3 UNITS


 


 Diltiazem HCl


  (Cardizem Tab)  60 mg  Q6H


 PO  3/20/18 04:00


 4/19/18 03:59  3/29/18 10:32


60 MG


 


 Atenolol


  (Tenormin Tab)  25 mg  QPM


 PO  3/21/18 21:00


 4/18/18 20:59  3/27/18 22:24


25 MG


 


 Rivaroxaban


  (Xarelto Tab)  15 mg  QDD


 PO  3/21/18 16:45


 4/20/18 16:44  3/28/18 16:52


15 MG


 


 Doxycycline


 Hyclate


  (Vibramycin Cap)  100 mg  BID


 PO  3/22/18 21:00


 3/29/18 20:59  3/29/18 07:54


100 MG


 


 Acetaminophen


  (Tylenol Tab)  650 mg  Q6H  PRN


 PO  3/22/18 10:45


 4/21/18 10:44  3/29/18 15:26


650 MG


 


 Levofloxacin


  (Consult)  1 ea  UD  PRN


 N/A  3/22/18 12:15


 4/21/18 12:14   


 


 


 Sodium Chloride


  (Sodium Chloride


 Tab)  1 gm  QID


 PO  3/24/18 17:00


 4/23/18 16:59  3/29/18 12:37


1 GM


 


 Insulin Glargine


  (Lantus Solostar


 Pen)  10 units  QAM


 SC  3/26/18 09:00


 4/25/18 08:59  3/29/18 08:01


10 UNITS


 


 Furosemide


  (Lasix Tab)  40 mg  QAM


 PO  3/26/18 10:30


 4/25/18 10:29  3/29/18 07:52


40 MG


 


 Levofloxacin


  (Levaquin Tab)  750 mg  Q2D@1100


 PO  3/27/18 11:00


 4/3/18 10:59  3/29/18 10:32


750 MG


 


 Acetazolamide


  (Diamox Tab)  250 mg  BID


 PO  3/27/18 09:00


 3/30/18 08:59  3/29/18 07:51


250 MG


 


 Prednisone


  (PredniSONE TAB)  50 mg  DAILY


 PO  3/28/18 09:00


 3/31/18 08:59  3/29/18 07:52


50 MG


 


 Tolvaptan


  (Samsca Tab)  15 mg  QAM


 PO  3/29/18 09:00


 4/28/18 08:59  3/29/18 07:53


15 MG











Objective


Vital Signs











  Date Time  Temp Pulse Resp B/P (MAP) Pulse Ox O2 Delivery O2 Flow Rate FiO2


 


3/29/18 16:01 36.4 75 16 112/81 (91) 100 Nasal Cannula 2.0 


 


3/29/18 15:30      Nasal Cannula 2.0 


 


3/29/18 08:00     98 Nasal Cannula 3.0 


 


3/29/18 07:10 36.4 67 20 134/84 (101) 98 Nasal Cannula 2.0 


 


3/29/18 00:06  80 18 132/92 (105) 92 BiPAP  


 


3/29/18 00:00      BiPAP 4.0 


 


3/28/18 22:17  85   94  3.0 











Physical Exam


Notes:


GENERAL: alert,  no distress 


EYE EXAM: normal conjunctiva, PERRL and EOM's grossly intact


NECK: supple, no nuchal rigidity, no adenopathy, non-tender


LUNGS: +Bilateral Rhonchi, No wheeze , No respiratory distress


HEART: S1 normal and S2 normal 


ABDOMEN: abdomen soft, non-tender, normo-active bowel sounds, no masses, no 

rebound or guarding. 


UPPER EXTREMITIES: upper extremities are grossly normal. 


LOWER EXTREMITIES: No pitting edema.


NEURO EXAM: Normal sensorium, cranial nerves II-XII grossly intact, normal 

speech





Laboratory Results





Results Past 24 Hours








Test


  3/28/18


20:16 3/29/18


07:22 3/29/18


07:26 3/29/18


11:17 Range/Units


 


 


Bedside Glucose 113  89 124 70-90  mg/dl


 


Sodium Level  128   136-145  mmol/L


 


Potassium Level  3.5   3.5-5.1  mmol/L


 


Chloride Level  85     mmol/L


 


Carbon Dioxide Level  39   21-32  mmol/L


 


Anion Gap  3.0   3-11  mmol/L


 


Blood Urea Nitrogen  40   7-18  mg/dl


 


Creatinine


  


  1.18


  


  


  0.60-1.20


mg/dl


 


Est Creatinine Clear Calc


Drug Dose 


  37.0


  


  


   ml/min


 


 


Estimated GFR (


American) 


  50.4


  


  


   


 


 


Estimated GFR (Non-


American 


  43.5


  


  


   


 


 


BUN/Creatinine Ratio  34.1   10-20  


 


Random Glucose  89   70-99  mg/dl


 


Calcium Level  8.8   8.5-10.1  mg/dl


 


Total Bilirubin  0.4   0.2-1  mg/dl


 


Aspartate Amino Transf


(AST/SGOT) 


  22


  


  


  15-37  U/L


 


 


Alanine Aminotransferase


(ALT/SGPT) 


  57


  


  


  12-78  U/L


 


 


Alkaline Phosphatase  170     U/L


 


Total Protein  6.0   6.4-8.2  gm/dl


 


Albumin  2.2   3.4-5.0  gm/dl


 


Globulin  3.8   2.5-4.0  gm/dl


 


Albumin/Globulin Ratio  0.6   0.9-2  


 


Procalcitonin  < 0.05   0-0.5  ng/ml


 


Test


  3/29/18


12:09 3/29/18


14:17 3/29/18


16:40 


  Range/Units


 


 


White Blood Count 22.20    4.8-10.8  K/uL


 


Red Blood Count 4.21    4.2-5.4  M/uL


 


Hemoglobin 13.7    12.0-16.0  g/dL


 


Hematocrit 40.8    37-47  %


 


Mean Corpuscular Volume 96.9      fL


 


Mean Corpuscular Hemoglobin 32.5    25-34  pg


 


Mean Corpuscular Hemoglobin


Concent 33.6


  


  


  


  32-36  g/dl


 


 


Platelet Count 218    130-400  K/uL


 


Mean Platelet Volume 9.6    7.4-10.4  fL


 


Neutrophils (%) (Auto) 88.5     %


 


Lymphocytes (%) (Auto) 4.0     %


 


Monocytes (%) (Auto) 6.8     %


 


Eosinophils (%) (Auto) 0.0     %


 


Basophils (%) (Auto) 0.1     %


 


Neutrophils # (Auto) 19.63    1.4-6.5  K/uL


 


Lymphocytes # (Auto) 0.89    1.2-3.4  K/uL


 


Monocytes # (Auto) 1.51    0.11-0.59  K/uL


 


Eosinophils # (Auto) 0.01    0-0.5  K/uL


 


Basophils # (Auto) 0.02    0-0.2  K/uL


 


RDW Standard Deviation 50.8    36.4-46.3  fL


 


RDW Coefficient of Variation 14.2    11.5-14.5  %


 


Immature Granulocyte % (Auto) 0.6     %


 


Immature Granulocyte # (Auto) 0.14    0.00-0.02  K/uL


 


Sodium Level 128 127   136-145  mmol/L


 


Potassium Level 3.6    3.5-5.1  mmol/L


 


Chloride Level 83      mmol/L


 


Carbon Dioxide Level 41    21-32  mmol/L


 


Anion Gap 4.0    3-11  mmol/L


 


Blood Urea Nitrogen 43    7-18  mg/dl


 


Creatinine


  1.24


  


  


  


  0.60-1.20


mg/dl


 


Est Creatinine Clear Calc


Drug Dose 35.2


  


  


  


   ml/min


 


 


Estimated GFR (


American) 47.5


  


  


  


   


 


 


Estimated GFR (Non-


American 41.0


  


  


  


   


 


 


BUN/Creatinine Ratio 34.7    10-20  


 


Random Glucose 134    70-99  mg/dl


 


Calcium Level 9.5    8.5-10.1  mg/dl


 


Bedside Glucose   154  70-90  mg/dl











Assessment and Plan


77yo F w/ history of  rheumatoid arthritis with interstitial lung disease, 

hypertension, diabetes, depression, paroxysmal afib p/w worsening shortness of 

breath found to be in Acute Hypoxic Hypercapnic respiratory failure  in the 

setting of Pneumonia





Acute hypoxic/hypercapnic respiratory failure: in the setting of Pneumonia and  

chronic interstitial lung disease


- Chest CT revealed interval worsening of peribronchovascular consolidation 


- MRSA positive, influenza negative


- Cefepime reinitiated  3/25 due to clinical worsening. 


- currently appears to be improving clinically.


- Leukocytosis likely steroid related, Continue to monitor.


- Continue  Doxy, Levaquin, DC'd Cefepime 3/27


- Elevated white ct potentially due to steroid dosing vs Pneumonia


- Gradual Steroids taper, currently  at 50 mg daily 


-  BIPAP at night, NC 4L during the day


- Will need Bipap on discharge





Hyponatremia/LON


- chronically hyponatremic with possible SIADH acute component


- Contiue to monitor Na


- Continue Sodium tablets, fluid restriction


- Urine osmolality 294 and serum osmolality 277


- Cr elevated from previous likely prerenal ,  maintain adequate rehydration


- Monitor BMP





NSTEMI:


-allergic to aspirin


-Continue pravastatin


-Echo : .EF 60-65%.  No regional wall motion abnormalities.  Mild concentric LVH

, Severe LA dilation, severe TR, RVSP- 37mmHg


  


Atrial fibrillation with rapid ventricular rate:


- Hx of paroxysmal atrial fib 


- rate controlled


- Continue Cardizem and Atenolol


- Continue Xarelto qpm





Hypertension :


-Continue atenolol and diltiazem


 


Hypervolemia:


- Echo as above 


- Lasix remains held 





Steroid-induced hyperglycemia


-Monitor blood sugar





RA 


- Placed on steroids


- Continue Cimzia as output when recovers from PNM





DVT prophylaxis: 


Rivaroxaban





Code status: Discussed patient wishes and filled out POLST form including 

confirming DNR status, comofrt measures, no antibiotics or artificial hydration 

or tube feeds. She does not wish to return to hospital





Disposition: [facility] placement tomorrow


Continued [facility] stay due to:  home environment unsafe for pt


Discharge planning:  skilled nursing facility





Resident Tracking


Resident Involvement:  Resident Care Provided


Care Provided:  Adult Hospital Medicine





Assessment/Plan


Resident Physician Supervision Note:





I was present with Dr. Stuart during the history and exam. I discussed the case 

with the resident and agree with the findings and plan as documented in the 

note.  Any exceptions or clarifications are listed here.





Pt reports that breathing has returned to pre-admission status and is 

comfortable on 3LO2 by NC during conversation. Cough has resolved, no new 

fevers. WBC has stabilized, so likely 2/2 steroid therapy gilbert considering 

clinical improvement.








Called to bedside for family discussion - revisited previous patient requests. 

Reinforced patient is improving and will not die immediately following 

discharge. Clarified with family present that, if she wants to not spend the 

remaining days of her life in bed, she should engage fully in the care of her 

medical conditions, which includes treatment of her depression and 

participation in PT/rehab. She is agreeable to both and will start the former 

this evening. Regarding rehabilitation, will notify case mgmt in the AM 

regarding the switch and hopefully go forward w/ placement at [facility] for 

subacute rehab.

## 2018-03-30 VITALS
DIASTOLIC BLOOD PRESSURE: 71 MMHG | SYSTOLIC BLOOD PRESSURE: 109 MMHG | HEART RATE: 71 BPM | OXYGEN SATURATION: 96 % | TEMPERATURE: 98.42 F

## 2018-03-30 VITALS — SYSTOLIC BLOOD PRESSURE: 109 MMHG | OXYGEN SATURATION: 96 % | HEART RATE: 71 BPM | DIASTOLIC BLOOD PRESSURE: 71 MMHG

## 2018-03-30 VITALS
OXYGEN SATURATION: 94 % | TEMPERATURE: 98.42 F | SYSTOLIC BLOOD PRESSURE: 130 MMHG | HEART RATE: 72 BPM | DIASTOLIC BLOOD PRESSURE: 71 MMHG

## 2018-03-30 VITALS — OXYGEN SATURATION: 94 %

## 2018-03-30 LAB
BUN SERPL-MCNC: 43 MG/DL (ref 7–18)
CALCIUM SERPL-MCNC: 8.6 MG/DL (ref 8.5–10.1)
CO2 SERPL-SCNC: 39 MMOL/L (ref 21–32)
CREAT SERPL-MCNC: 1.27 MG/DL (ref 0.6–1.2)
GLUCOSE SERPL-MCNC: 79 MG/DL (ref 70–99)
POTASSIUM SERPL-SCNC: 3.5 MMOL/L (ref 3.5–5.1)
SODIUM SERPL-SCNC: 129 MMOL/L (ref 136–145)

## 2018-03-30 RX ADMIN — TOLVAPTAN SCH MG: 15 TABLET ORAL at 07:49

## 2018-03-30 RX ADMIN — INSULIN ASPART SCH UNITS: 100 INJECTION, SOLUTION INTRAVENOUS; SUBCUTANEOUS at 17:28

## 2018-03-30 RX ADMIN — DICLOFENAC SODIUM SCH APPLN: 10 GEL TOPICAL at 16:00

## 2018-03-30 RX ADMIN — SODIUM CHLORIDE TAB 1 GM SCH GM: 1 TAB at 07:47

## 2018-03-30 RX ADMIN — INSULIN ASPART SCH UNITS: 100 INJECTION, SOLUTION INTRAVENOUS; SUBCUTANEOUS at 11:00

## 2018-03-30 RX ADMIN — DILTIAZEM HYDROCHLORIDE SCH MG: 60 TABLET, FILM COATED ORAL at 07:48

## 2018-03-30 RX ADMIN — DICLOFENAC SODIUM SCH APPLN: 10 GEL TOPICAL at 12:19

## 2018-03-30 RX ADMIN — RIVAROXABAN SCH MG: 15 TABLET, FILM COATED ORAL at 16:08

## 2018-03-30 RX ADMIN — PANTOPRAZOLE SCH MG: 40 TABLET, DELAYED RELEASE ORAL at 07:46

## 2018-03-30 RX ADMIN — SODIUM CHLORIDE TAB 1 GM SCH GM: 1 TAB at 12:48

## 2018-03-30 RX ADMIN — INSULIN ASPART SCH UNITS: 100 INJECTION, SOLUTION INTRAVENOUS; SUBCUTANEOUS at 06:30

## 2018-03-30 RX ADMIN — Medication SCH TAB: at 09:35

## 2018-03-30 RX ADMIN — DILTIAZEM HYDROCHLORIDE SCH MG: 60 TABLET, FILM COATED ORAL at 16:00

## 2018-03-30 RX ADMIN — DICLOFENAC SODIUM SCH APPLN: 10 GEL TOPICAL at 07:46

## 2018-03-30 RX ADMIN — INSULIN GLARGINE SCH UNITS: 100 INJECTION, SOLUTION SUBCUTANEOUS at 07:54

## 2018-03-30 RX ADMIN — DILTIAZEM HYDROCHLORIDE SCH MG: 60 TABLET, FILM COATED ORAL at 03:50

## 2018-03-30 RX ADMIN — SODIUM CHLORIDE TAB 1 GM SCH GM: 1 TAB at 16:09

## 2018-03-30 RX ADMIN — FUROSEMIDE SCH MG: 40 TABLET ORAL at 07:50

## 2018-03-30 NOTE — DISCHARGE SUMMARY
Discharge Summary


Date of Service


Mar 30, 2018.





Discharge Summary


Admission Date:


Mar 19, 2018 at 00:48


Discharge Date:  Mar 30, 2018


Discharge Disposition:  Skilled nursing facility


Principal Diagnosis:  Acute on chronic hypoxic/hypercapnic resp failure


Problems/Secondary Diagnoses:


Chronic interstitial lung disease


Hyponatremia


Acute Kidney injury


NSTEMI


Atrial fibrillation w RVR


Hypertension


Steroid induced hyperglycemia


Rheumatoid Arthritis


Consultations:


Cardiology


Nephrology





Medication Reconciliation


New Medications:  


Diltiazem HCl (Diltiazem HCl) 60 Mg Tab


60 MG PO Q6H for 30 Days, #120 TAB





Furosemide (Furosemide) 40 Mg Tab


40 MG PO QAM for 30 Days, #30 TAB





Insulin Aspart (Novolog Flexpen) 100 Units/Ml Inj


0 UNITS SC ACHS for 30 Days, #30





Insulin Glargine (Lantus Solostar) 100 Unit/Ml Inj


10 UNITS SC QAM for 30 Days, #30





Levofloxacin (Levofloxacin) 750 Mg Tab


750 MG PO Q2D@1100 for 4 Days, TAB





Prednisone (Prednisone) 50 Mg Tab


50 MG PO DAILY for 30 Days, #30 TAB


prednisone taper, 50 mg x 6 days, 40 mg x 6 days, 30 mg x 6 days, 20


 mg x  days, 10 mg x 6 days, 5 mgs daily (normal dose)


Rivaroxaban (Xarelto) 15 Mg Tab


15 MG PO QDD for 30 Days, #30 TAB





Sertraline HCl (Sertraline HCl) 50 Mg Tab


25 MG PO QPM for 30 Days, TAB





Sodium Chloride (Sodium Chloride) 1 Gm Tab


1 GM PO QID for 30 Days, #120 TAB





 


Continued Medications:  


Acetamin/Butalbital/Caffeine (Fioricet) 1 Ea Tab


1 TAB PO UD PRN for Migraine, TAB





Atenolol (Tenormin) 25 Mg Tab


25 MG PO QPM, TAB





Calcium/Vitamin D (Os-Manny 500 Plus D)  Tab


1 TAB PO BID, TAB





Clonazepam (Klonopin) 0.5 Mg Tab


0.5 MG PO HS PRN for Sleep, TAB





Estropipate (Estropipate) 0.75 Mg Tab


0.75 MG PO QPM





Medroxyprogesterone (Provera) 2.5 Mg Tab


2.5 MG PO QPM, TAB





Ocuvite Preservision (Ocuvite Preservision) 1 Tab Tab


1 TAB PO BID, TAB





Pantoprazole (Pantoprazole Sodium) 40 Mg Tab


40 MG PO QDL





Pravastatin Sodium (Pravastatin Sodium) 40 Mg Tab


80 MG PO QPM





Prednisone (Prednisone) 5 Mg Tab


10 MG PO QAM, TAB





Zinc Oxide (Topical) (Triple Paste) 12.8 % Oin


1 APPLN TD BID





 


Discontinued Medications:  


Diltiazem Hcl Ext Rel (Tiazac) 240 Mg Capcr


240 MG PO QPM, CAP





Losartan Potassium (Cozaar) 25 Mg Tab


25 MG PO QPM, TAB











Discharge Exam


GOALS OF CARE:


Patient presented with acute on chronic hypoxic/hypercapnic resp failure 2/2 to 

pneumonia and underlying ILD. Also had hyponatremia possibly due to SIADH. 

After multiple discussions with the family and patient, they have chosen to 

treat all current acute/chronic conditions. However, going forward, she will be 

DNR and Do not hospitalize. She will partake in physical therapy. Due to her 

lung disease and SIADH, if she would like treatment beyond what has been 

discussed and prescribed, she will need monitoring of BMP/ lytes. This will 

need to be readdressed with the family as they have had a change of heart a few 

times. Presently, we are sending her to [facility] with remaining treatment 

for pneumonia, ILD, Hyponatremia and other chronic medical conditions with the 

intention to transition to hospice with no further blood work and no further 

follow-up with specialists. 





77yo F w/ history of  rheumatoid arthritis with interstitial lung disease, 

hypertension, diabetes, depression, paroxysmal afib p/w worsening shortness of 

breath found to be in Acute Hypoxic Hypercapnic respiratory failure  in the 

setting of Pneumonia





Acute hypoxic/hypercapnic respiratory failure: in the setting of Pneumonia and  

chronic interstitial lung disease


- Chest CT revealed interval worsening of peribronchovascular consolidation 


- MRSA positive, influenza negative


- Levaquin + Doxy + cefepime --> d/c with 4 days of Levaquin


- At baseline oxygen requirement at discharge, -  BIPAP at night, NC 4L during 

the day


- Leukocytosis likely steroid related, Continue to monitor.


- Prolonged Steroid Taper at D/C





Hyponatremia/LON


- chronically hyponatremic with possible SIADH acute component


- Continue Sodium tablets, fluid restriction


- Urine osmolality 294 and serum osmolality 277


- Cr elevated from previous likely prerenal ,  maintain adequate rehydration


- Was treated with tolvaptan briefly but needs frequent monitoring and good 

appetite so discontinued at d/c


- Na 129 at discharge- expect a decline. 





NSTEMI:


-allergic to aspirin


-Continue pravastatin


-Echo : .EF 60-65%.  No regional wall motion abnormalities.  Mild concentric LVH

, Severe LA dilation, severe TR, RVSP- 37mmHg


  


Atrial fibrillation with rapid ventricular rate:


- Hx of paroxysmal atrial fib 


- Cardizem and Atenolol, Xarelto qpm





Hypertension :


-Continue atenolol and diltiazem


 


Hypervolemia:


- Echo as above 


- Lasix daily





Steroid-induced hyperglycemia


- Monitor blood sugar


- Lantus and ISS as needed


- has a prolonged taper





RA 


- Placed on steroids- taper down to 10 mg daily


- Continue Cimzia as output when recovers from PNM





DVT prophylaxis: 


Rivaroxaban





Code status: POLST complete, DNR, Do not hospitalize





Resident Physician Supervision Note:





I was present with Dr. Dillon during the history and exam. I discussed the case 

with the resident and agree with the findings and plan as documented in the 

note.  Upon my exam this morning, patient sleepy but awakens to voice. She 

appears comfortable and denied pain or distress. Agree with plan going forward; 

she is hospice eligible, and recommend hospice consult once at nursing home. 





Documented By:  Rashawn Lake


Review of Systems:  


   Constitutional:  No fever, No chills


   ENT:  No hearing loss


   Respiratory:  + dyspnea on exertion, No cough, No sputum, No shortness of 

breath, No dyspnea at rest


   Cardiovascular:  No chest pain


   Abdomen:  No pain, No nausea, No vomiting


Physical Exam:  


   General Appearance:  no apparent distress


   Eyes:  PERRL, EOMI


   Neck:  supple, no JVD


   Respiratory/Chest:  no respiratory distress, no accessory muscle use, + 

rhonchi


   Cardiovascular:  normal peripheral pulses, + irregularly irregular


   Abdomen / GI:  normal bowel sounds, non tender


   Extremities:  no calf tenderness, + pedal edema (trace)


   Neurologic/Psychiatric:  normal mood/affect, + depressed affect





Hospital Course


Total Time Spent:  Less than 30 minutes


This includes examination of the patient, discharge planning, medication 

reconciliation, and communication with other providers.





Discharge Instructions


Please refer to the electronic Patient Visit Report (Discharge Instructions) 

for additional information.





Additional Copies To


[facility]

## 2018-03-30 NOTE — DISCHARGE INSTRUCTIONS
Discharge Instructions


Date of Service


Mar 30, 2018.





Admission


Reason for Admission:  Acute On Chronic Resp Fail. W/ Hypoxia/Hypercapnia





Discharge


Discharge Diagnosis / Problem:  Acute on chronic resp failure





Discharge Goals


Goal(s):  Decrease discomfort, Improve function, Increase independence, 

Diagnostic testing, Therapeutic intervention, Prevent Disease Progression





Activity Recommendations


Activity Level:  Assistance Required


Therapies:  Physical Therapy, Occupational Therapy


Lifting Limitations:  gradually increase as tolerated


Exercise/Sports Limitations:  none





.





Additional Information


Patient informed of condition:  Yes


Advance Directives:  Yes


DNR:  Yes


Level of Care:  Acute Rehab


Communicable Disease:  No


Prognosis:  Stable





Instructions / Follow-Up


Instructions / Follow-Up


Greta was admitted for resp failure 2/2 pneumonia in the setting of ILD.





Goals of care: Multiple discussions occurred with family while in the hospital. 

Ultimately patient will be DNR, Do not hospitalize going forward. However, they 

do want to complete treatment for current conditions. POLST form complete. 

Eventually will need to transition to hospice due to her chronic lung 

condition. See below for management: 





Resp failure:


Improved, She is at baseline Oxygen requirement of 3-4 L


Meds:


Complete 4 days of Levaquin


Extended prednisone Taper 50 mg x 6 days, 40 mg x 6 days, 30 mg x 6 days, 20 mg 

x 6 days, and then 10 mg daily (her usual dose)





Acute on chronic Hyponatremia (likely due to her lung disease/SIADH)


129 at discharge 


Continue 4 g of Sodium Tablets per day


Furosemide 40 mg daily


Will need to revisit with family, in regards to checking blood work


Does not need follow up with nephrology if she does not want any blood work and 

does not want to be hospitalized in the future.





Steroid induced hyperglycemia:


Continue Lantus and Sliding scale until down the 5 mg of prednisone





AFIB:


Atenolol 25 mg 


Diltiazem 60 mg q6h


Xarelto 15 mg





Depression


Sertraline





PT/OT


See discharge summary for any further details.





Current Hospital Diet


Patient's current hospital diet: AHA Diet (Heart Healthy), Diabetes Type 2 Diet





Discharge Diet


Recommended Diet:  Regular Diet





Pending Studies


Studies pending at discharge:  no





Physician Orders On Transfer


POLST Discussion:  with POLST completion





Medical Emergencies








.


Who to Call and When:





Medical Emergencies:  If at any time you feel your situation is an emergency, 

please call 911 immediately.





.





Non-Emergent Contact


Non-Emergency issues call your:  Primary Care Provider





.


.








"Provider Documentation" section prepared by Skye Mathews.








.





Core Measure Problem


Core Measures:  None

## 2018-03-30 NOTE — NEPHROLOGY PROGRESS NOTE
Nephrology Progress Note


Date of Service


Mar 30, 2018.





Chief Complaint


F/U for hyponatremia and acute kidney injury.





Subjective


Greta was seen and examined in her room this morning.  Overall feeling poorly. 

She has been net negative. Serum sodium improved to 129





Review of Systems


A complete review of systems was performed.  Pertinent positives are noted 

above. All other systems are negative.





Vital Signs





Last 8 Hrs








  Date Time  Temp Pulse Resp B/P (MAP) Pulse Ox O2 Delivery O2 Flow Rate FiO2


 


3/30/18 07:32 36.9 72 18 130/71 (90) 94 Nasal Cannula 4.0 











Last Recorded Weight


Weight (Kilograms):  71.200





Physical Exam


GENERAL:  Elderly female, AAA x 3, ill appearing, in mild distress.


NECK: Supple, no JVD.


RESPIRATORY:  Crackles bilaterally at bases


CARDIOVASCULAR: S1, S2 normal, rate rhythm regular.


EXTREMITY:  Trace bilateral lower extremity edema


NEURO: speech fluent.


PSYCHIATRY:  Depressed mood





Family History





Cancer


Diabetes mellitus


Gallbladder disease


Heart disease


Lung disease





Social History


Smoking Status:  Never smoker


Smokeless Tobacco Use:  No


Alcohol Use:  none


Drug Use:  none


Marital Status:  


Occupation:  retired





Laboratory Results


Past 24 Hours


3/29/18 12:09








Red Blood Count 4.21, Mean Corpuscular Volume 96.9, Mean Corpuscular Hemoglobin 

32.5, Mean Corpuscular Hemoglobin Concent 33.6, Mean Platelet Volume 9.6, 

Neutrophils (%) (Auto) 88.5, Lymphocytes (%) (Auto) 4.0, Monocytes (%) (Auto) 

6.8, Eosinophils (%) (Auto) 0.0, Basophils (%) (Auto) 0.1, Neutrophils # (Auto) 

19.63, Lymphocytes # (Auto) 0.89, Monocytes # (Auto) 1.51, Eosinophils # (Auto) 

0.01, Basophils # (Auto) 0.02





3/29/18 12:09








3/29/18 14:17








3/30/18 05:50

















Test


  3/29/18


11:17 3/29/18


12:09 3/29/18


16:40 3/29/18


20:07


 


Bedside Glucose


  124 mg/dl


(70-90) 


  154 mg/dl


(70-90) 323 mg/dl


(70-90)


 


White Blood Count


  


  22.20 K/uL


(4.8-10.8) 


  


 


 


Red Blood Count


  


  4.21 M/uL


(4.2-5.4) 


  


 


 


Hemoglobin


  


  13.7 g/dL


(12.0-16.0) 


  


 


 


Hematocrit  40.8 % (37-47)   


 


Mean Corpuscular Volume


  


  96.9 fL


() 


  


 


 


Mean Corpuscular Hemoglobin


  


  32.5 pg


(25-34) 


  


 


 


Mean Corpuscular Hemoglobin


Concent 


  33.6 g/dl


(32-36) 


  


 


 


Platelet Count


  


  218 K/uL


(130-400) 


  


 


 


Mean Platelet Volume


  


  9.6 fL


(7.4-10.4) 


  


 


 


Neutrophils (%) (Auto)  88.5 %   


 


Lymphocytes (%) (Auto)  4.0 %   


 


Monocytes (%) (Auto)  6.8 %   


 


Eosinophils (%) (Auto)  0.0 %   


 


Basophils (%) (Auto)  0.1 %   


 


Neutrophils # (Auto)


  


  19.63 K/uL


(1.4-6.5) 


  


 


 


Lymphocytes # (Auto)


  


  0.89 K/uL


(1.2-3.4) 


  


 


 


Monocytes # (Auto)


  


  1.51 K/uL


(0.11-0.59) 


  


 


 


Eosinophils # (Auto)


  


  0.01 K/uL


(0-0.5) 


  


 


 


Basophils # (Auto)


  


  0.02 K/uL


(0-0.2) 


  


 


 


RDW Standard Deviation


  


  50.8 fL


(36.4-46.3) 


  


 


 


RDW Coefficient of Variation


  


  14.2 %


(11.5-14.5) 


  


 


 


Immature Granulocyte % (Auto)  0.6 %   


 


Immature Granulocyte # (Auto)


  


  0.14 K/uL


(0.00-0.02) 


  


 


 


Anion Gap


  


  4.0 mmol/L


(3-11) 


  


 


 


Est Creatinine Clear Calc


Drug Dose 


  35.2 ml/min 


  


  


 


 


Estimated GFR (


American) 


  47.5 


  


  


 


 


Estimated GFR (Non-


American 


  41.0 


  


  


 


 


BUN/Creatinine Ratio  34.7 (10-20)   


 


Calcium Level


  


  9.5 mg/dl


(8.5-10.1) 


  


 


 


Test


  3/29/18


20:09 3/30/18


05:50 3/30/18


07:32 


 


 


Bedside Glucose


  294 mg/dl


(70-90) 


  95 mg/dl


(70-90) 


 


 


Anion Gap


  


  4.0 mmol/L


(3-11) 


  


 


 


Est Creatinine Clear Calc


Drug Dose 


  34.4 ml/min 


  


  


 


 


Estimated GFR (


American) 


  46.2 


  


  


 


 


Estimated GFR (Non-


American 


  39.8 


  


  


 


 


BUN/Creatinine Ratio  33.7 (10-20)   


 


Calcium Level


  


  8.6 mg/dl


(8.5-10.1) 


  


 











Allergies


Coded Allergies:  


     Amoxicillin (Verified  Allergy, Unknown, UNKNOWN, 3/18/18)


     Clavulanic Acid (Verified  Allergy, Unknown, UNKNOWN, 3/18/18)


     Clindamycin (Verified  Allergy, Unknown, UNKNOWN, 3/18/18)


     Penicillins (Verified  Allergy, Unknown, UNKNOWN, 3/18/18)


     Sulfa Antibiotics (Verified  Adverse Reaction, Intermediate, NAUSEA, 3/18/

18)


     Aspirin (Verified  Adverse Reaction, Mild, GI SYMPTOMS, 3/18/18)


Uncoded Allergies:  


     ARTIFICIAL SWEETNER (Allergy, Mild, TONGUE SORE, 5/9/13)





Medications





Current Inpatient Medications








 Medications


  (Trade)  Dose


 Ordered  Sig/Walter


 Route  Start Time


 Stop Time Status Last Admin


Dose Admin


 


 Al Hydrox/Mg


 Hydrox/Simethicone


  (Maalox Max Susp)  15 ml  Q4H  PRN


 PO  3/19/18 00:15


 4/18/18 00:14   


 


 


 Magnesium


 Hydroxide


  (Milk Of


 Magnesia Susp)  30 ml  Q12H  PRN


 PO  3/19/18 00:15


 4/18/18 00:14  3/24/18 15:29


30 ML


 


 Ondansetron HCl


  (Zofran Inj)  4 mg  Q6H  PRN


 IV  3/19/18 00:15


 4/18/18 00:14  3/22/18 11:07


4 MG


 


 Nitroglycerin


  (Nitrostat Tab)  0.4 mg  UD  PRN


 SL  3/19/18 00:15


 4/18/18 00:14   


 


 


 Polyethylene


  (Miralax Powder


 Packet)  17 gm  DAILY  PRN


 PO  3/19/18 00:15


 4/18/18 00:14  3/29/18 15:03


17 GM


 


 Acetaminophen/


 Butalbital/


 Caffeine


  (Fioricet Tab)  1 tab  DAILY  PRN


 PO  3/19/18 00:15


 4/18/18 00:14   


 


 


 Calcium/Vitamin D


  (Caltrate Plus


 Tab)  1 tab  BID


 PO  3/19/18 09:00


 4/18/18 08:59  3/29/18 20:19


1 TAB


 


 Clonazepam


  (Klonopin Tab)  0.5 mg  HS  PRN


 PO  3/19/18 00:15


 4/18/18 00:14  3/22/18 03:06


0.5 MG


 


 Multivitamins/


 Minerals


  (Multivitamin W/


 Minerals Tab)  1 tab  BID


 PO  3/19/18 09:00


 4/18/18 08:59  3/29/18 20:19


1 TAB


 


 Pantoprazole


 Sodium


  (Protonix Tab)  40 mg  QDL


 PO  3/19/18 11:00


 4/18/18 10:59  3/30/18 07:46


40 MG


 


 Pravastatin Sodium


  (Pravachol Tab)  80 mg  QPM


 PO  3/19/18 21:00


 4/18/18 20:59  3/29/18 20:19


80 MG


 


 Miscellaneous


  (Iv Fluids


 Completed)  1 ea  PRN  PRN


 N/A  3/19/18 03:30


 3/19/19 03:29   


 


 


 Diclofenac Sodium


  (Voltaren 1% Top


 Gel)  1 appln  [id number]


 EXT  3/19/18 16:00


 4/18/18 15:59  3/30/18 07:46


1 APPLN


 


 Glucagon


  (Glucagon Inj)  1 mg  UD  PRN


 SQ  3/19/18 19:15


 4/18/18 19:14   


 


 


 Glucose


  (Glucose 40% Gel)  15-30


 GRAMS 15


 GRAMS...  UD  PRN


 PO  3/19/18 19:15


 4/18/18 19:14   


 


 


 Glucose


  (Glucose Chew


 Tab)  4-8


 Tablets 4


 Tabl...  UD  PRN


 PO  3/19/18 19:15


 4/18/18 19:14   


 


 


 Insulin Aspart


  (novoLOG ASPART)  **SLIDING


 SCALE**


 **If C...  ACHS


 SC  3/20/18 07:00


 4/19/18 06:59  3/29/18 20:14


10 UNITS


 


 Diltiazem HCl


  (Cardizem Tab)  60 mg  Q6H


 PO  3/20/18 04:00


 4/19/18 03:59  3/30/18 07:48


60 MG


 


 Atenolol


  (Tenormin Tab)  25 mg  QPM


 PO  3/21/18 21:00


 4/18/18 20:59  3/29/18 20:18


25 MG


 


 Rivaroxaban


  (Xarelto Tab)  15 mg  QDD


 PO  3/21/18 16:45


 4/20/18 16:44  3/29/18 16:54


15 MG


 


 Acetaminophen


  (Tylenol Tab)  650 mg  Q6H  PRN


 PO  3/22/18 10:45


 4/21/18 10:44  3/29/18 15:26


650 MG


 


 Levofloxacin


  (Consult)  1 ea  UD  PRN


 N/A  3/22/18 12:15


 4/21/18 12:14   


 


 


 Sodium Chloride


  (Sodium Chloride


 Tab)  1 gm  QID


 PO  3/24/18 17:00


 4/23/18 16:59  3/30/18 07:47


1 GM


 


 Insulin Glargine


  (Lantus Solostar


 Pen)  10 units  QAM


 SC  3/26/18 09:00


 4/25/18 08:59  3/30/18 07:54


10 UNITS


 


 Furosemide


  (Lasix Tab)  40 mg  QAM


 PO  3/26/18 10:30


 4/25/18 10:29  3/30/18 07:50


40 MG


 


 Levofloxacin


  (Levaquin Tab)  750 mg  Q2D@1100


 PO  3/27/18 11:00


 4/3/18 10:59  3/29/18 10:32


750 MG


 


 Acetazolamide


  (Diamox Tab)  250 mg  BID


 PO  3/27/18 09:00


 3/30/18 08:59  3/29/18 20:19


250 MG


 


 Prednisone


  (PredniSONE TAB)  50 mg  DAILY


 PO  3/28/18 09:00


 3/31/18 08:59  3/30/18 07:48


50 MG


 


 Tolvaptan


  (Samsca Tab)  15 mg  QAM


 PO  3/29/18 09:00


 4/28/18 08:59  3/30/18 07:49


15 MG


 


 Sertraline HCl


  (Zoloft Tab)  25 mg  QPM


 PO  3/29/18 20:00


 4/28/18 19:59  3/29/18 21:35


25 MG











Impression





(1) Hyponatremia


(2) Acute kidney injury


(3) Acute on chronic respiratory failure with hypoxia and hypercapnia


(4) Hypertension


78-year-old female with acute hyponatremia with chronic history of hyponatremia

, in the setting of acute on chronic respiratory failure, pneumonia versus 

pulmonary congestion.  Serum sodium dropped with normal saline, slightly 

improved to 121 this morning after normal saline was discontinued. 


Hyponatremia could be secondary to SIADH in the setting of respiratory illness, 

other possibilities such as adrenal insufficiency considering chronic steroid 

therapy remains however blood pressure has been elevated.  Random cortisol and 

TSH was normal.  No personal history of malignancy.





No history of chronic kidney disease on admission creatinine was 0.9, developed 

acute kidney injury over the course of hospital stay, creatinine peaked to 1.6, 

most likely Hemodynamically mediated,  which currently seems to be improving 

last creatinine was 1.3.  Has been voiding normally, Urinalysis with no 

proteinuria hematuria.





Recommendations





-tolvaptan 15 mg one dose now


--continue on salt tablet.


--liberalize salt in diet


--increase protein intake





Will follow